# Patient Record
Sex: MALE | Race: WHITE | NOT HISPANIC OR LATINO | Employment: FULL TIME | URBAN - METROPOLITAN AREA
[De-identification: names, ages, dates, MRNs, and addresses within clinical notes are randomized per-mention and may not be internally consistent; named-entity substitution may affect disease eponyms.]

---

## 2017-02-05 ENCOUNTER — HOSPITAL ENCOUNTER (EMERGENCY)
Facility: MEDICAL CENTER | Age: 27
End: 2017-02-05
Attending: EMERGENCY MEDICINE
Payer: COMMERCIAL

## 2017-02-05 ENCOUNTER — APPOINTMENT (OUTPATIENT)
Dept: RADIOLOGY | Facility: MEDICAL CENTER | Age: 27
End: 2017-02-05
Attending: EMERGENCY MEDICINE
Payer: COMMERCIAL

## 2017-02-05 VITALS
WEIGHT: 155.65 LBS | HEIGHT: 72 IN | SYSTOLIC BLOOD PRESSURE: 117 MMHG | DIASTOLIC BLOOD PRESSURE: 65 MMHG | HEART RATE: 57 BPM | TEMPERATURE: 97.4 F | OXYGEN SATURATION: 97 % | BODY MASS INDEX: 21.08 KG/M2 | RESPIRATION RATE: 16 BRPM

## 2017-02-05 DIAGNOSIS — N41.0 ACUTE PROSTATITIS: ICD-10-CM

## 2017-02-05 LAB
ALBUMIN SERPL BCP-MCNC: 5.1 G/DL (ref 3.2–4.9)
ALBUMIN/GLOB SERPL: 2 G/DL
ALP SERPL-CCNC: 78 U/L (ref 30–99)
ALT SERPL-CCNC: 13 U/L (ref 2–50)
ANION GAP SERPL CALC-SCNC: 10 MMOL/L (ref 0–11.9)
APPEARANCE UR: CLEAR
AST SERPL-CCNC: 22 U/L (ref 12–45)
BASOPHILS # BLD AUTO: 0.5 % (ref 0–1.8)
BASOPHILS # BLD: 0.02 K/UL (ref 0–0.12)
BILIRUB SERPL-MCNC: 0.7 MG/DL (ref 0.1–1.5)
BILIRUB UR QL STRIP.AUTO: NEGATIVE
BUN SERPL-MCNC: 12 MG/DL (ref 8–22)
CALCIUM SERPL-MCNC: 10.1 MG/DL (ref 8.5–10.5)
CHLORIDE SERPL-SCNC: 102 MMOL/L (ref 96–112)
CO2 SERPL-SCNC: 24 MMOL/L (ref 20–33)
COLOR UR: YELLOW
CREAT SERPL-MCNC: 0.74 MG/DL (ref 0.5–1.4)
EOSINOPHIL # BLD AUTO: 0.04 K/UL (ref 0–0.51)
EOSINOPHIL NFR BLD: 0.9 % (ref 0–6.9)
ERYTHROCYTE [DISTWIDTH] IN BLOOD BY AUTOMATED COUNT: 40.9 FL (ref 35.9–50)
GFR SERPL CREATININE-BSD FRML MDRD: >60 ML/MIN/1.73 M 2
GLOBULIN SER CALC-MCNC: 2.6 G/DL (ref 1.9–3.5)
GLUCOSE SERPL-MCNC: 110 MG/DL (ref 65–99)
GLUCOSE UR STRIP.AUTO-MCNC: NEGATIVE MG/DL
HCT VFR BLD AUTO: 42.6 % (ref 42–52)
HGB BLD-MCNC: 15.5 G/DL (ref 14–18)
IMM GRANULOCYTES # BLD AUTO: 0.01 K/UL (ref 0–0.11)
IMM GRANULOCYTES NFR BLD AUTO: 0.2 % (ref 0–0.9)
KETONES UR STRIP.AUTO-MCNC: 10 MG/DL
LEUKOCYTE ESTERASE UR QL STRIP.AUTO: NEGATIVE
LIPASE SERPL-CCNC: 9 U/L (ref 11–82)
LYMPHOCYTES # BLD AUTO: 0.8 K/UL (ref 1–4.8)
LYMPHOCYTES NFR BLD: 18.7 % (ref 22–41)
MCH RBC QN AUTO: 33.4 PG (ref 27–33)
MCHC RBC AUTO-ENTMCNC: 36.4 G/DL (ref 33.7–35.3)
MCV RBC AUTO: 91.8 FL (ref 81.4–97.8)
MICRO URNS: ABNORMAL
MONOCYTES # BLD AUTO: 0.44 K/UL (ref 0–0.85)
MONOCYTES NFR BLD AUTO: 10.3 % (ref 0–13.4)
NEUTROPHILS # BLD AUTO: 2.96 K/UL (ref 1.82–7.42)
NEUTROPHILS NFR BLD: 69.4 % (ref 44–72)
NITRITE UR QL STRIP.AUTO: NEGATIVE
NRBC # BLD AUTO: 0 K/UL
NRBC BLD AUTO-RTO: 0 /100 WBC
PH UR STRIP.AUTO: 6 [PH]
PLATELET # BLD AUTO: 146 K/UL (ref 164–446)
PMV BLD AUTO: 10.9 FL (ref 9–12.9)
POTASSIUM SERPL-SCNC: 3.4 MMOL/L (ref 3.6–5.5)
PROT SERPL-MCNC: 7.7 G/DL (ref 6–8.2)
PROT UR QL STRIP: NEGATIVE MG/DL
RBC # BLD AUTO: 4.64 M/UL (ref 4.7–6.1)
RBC UR QL AUTO: NEGATIVE
SODIUM SERPL-SCNC: 136 MMOL/L (ref 135–145)
SP GR UR STRIP.AUTO: 1.02
WBC # BLD AUTO: 4.3 K/UL (ref 4.8–10.8)

## 2017-02-05 PROCEDURE — 96375 TX/PRO/DX INJ NEW DRUG ADDON: CPT

## 2017-02-05 PROCEDURE — 81003 URINALYSIS AUTO W/O SCOPE: CPT

## 2017-02-05 PROCEDURE — 36415 COLL VENOUS BLD VENIPUNCTURE: CPT

## 2017-02-05 PROCEDURE — 700111 HCHG RX REV CODE 636 W/ 250 OVERRIDE (IP): Performed by: EMERGENCY MEDICINE

## 2017-02-05 PROCEDURE — 83690 ASSAY OF LIPASE: CPT

## 2017-02-05 PROCEDURE — 74177 CT ABD & PELVIS W/CONTRAST: CPT

## 2017-02-05 PROCEDURE — 96374 THER/PROPH/DIAG INJ IV PUSH: CPT

## 2017-02-05 PROCEDURE — 85025 COMPLETE CBC W/AUTO DIFF WBC: CPT

## 2017-02-05 PROCEDURE — 80053 COMPREHEN METABOLIC PANEL: CPT

## 2017-02-05 PROCEDURE — 700105 HCHG RX REV CODE 258: Performed by: EMERGENCY MEDICINE

## 2017-02-05 PROCEDURE — 700117 HCHG RX CONTRAST REV CODE 255: Performed by: EMERGENCY MEDICINE

## 2017-02-05 PROCEDURE — 99285 EMERGENCY DEPT VISIT HI MDM: CPT

## 2017-02-05 RX ORDER — HYDROCODONE BITARTRATE AND ACETAMINOPHEN 7.5; 325 MG/1; MG/1
1 TABLET ORAL EVERY 4 HOURS PRN
Qty: 15 TAB | Refills: 0 | Status: SHIPPED | OUTPATIENT
Start: 2017-02-05 | End: 2017-11-08

## 2017-02-05 RX ORDER — ONDANSETRON 2 MG/ML
4 INJECTION INTRAMUSCULAR; INTRAVENOUS ONCE
Status: COMPLETED | OUTPATIENT
Start: 2017-02-05 | End: 2017-02-05

## 2017-02-05 RX ORDER — CIPROFLOXACIN 500 MG/1
500 TABLET, FILM COATED ORAL 2 TIMES DAILY
Qty: 20 TAB | Refills: 0 | Status: SHIPPED | OUTPATIENT
Start: 2017-02-05 | End: 2017-11-08

## 2017-02-05 RX ORDER — SODIUM CHLORIDE 9 MG/ML
1000 INJECTION, SOLUTION INTRAVENOUS ONCE
Status: COMPLETED | OUTPATIENT
Start: 2017-02-05 | End: 2017-02-05

## 2017-02-05 RX ADMIN — HYDROMORPHONE HYDROCHLORIDE 1 MG: 1 INJECTION, SOLUTION INTRAMUSCULAR; INTRAVENOUS; SUBCUTANEOUS at 08:06

## 2017-02-05 RX ADMIN — SODIUM CHLORIDE 1000 ML: 9 INJECTION, SOLUTION INTRAVENOUS at 08:12

## 2017-02-05 RX ADMIN — ONDANSETRON 4 MG: 2 INJECTION, SOLUTION INTRAMUSCULAR; INTRAVENOUS at 08:06

## 2017-02-05 RX ADMIN — IOHEXOL 100 ML: 350 INJECTION, SOLUTION INTRAVENOUS at 10:11

## 2017-02-05 ASSESSMENT — PAIN SCALES - GENERAL
PAINLEVEL_OUTOF10: 6
PAINLEVEL_OUTOF10: 7

## 2017-02-05 NOTE — ED PROVIDER NOTES
ED Provider Note    CHIEF COMPLAINT  Chief Complaint   Patient presents with   • Back Pain     pt states back pain started 1730 yesterday and progessed to abd pain this am. Pt states normal bm yesterday.  Pt denies blood in the urine.  Denies hx kidney stones   • Abdominal Pain       HPI  Rafi Zapata Jr. is a 26 y.o. male who presents for evaluation of abdominal pain.  Patient states that around 5:30 PM last evening he began developing some mild abdominal pain.  Today the patient states the pain has gotten more severe and was 7/10 on his pain scale.  Most of the pain is diffusely in the lower abdominal area.  The patient denies: Fever, URI symptoms, cardiorespiratory symptoms, hematemesis, melena, hematochezia, hematuria, dysuria, scrotal or testicular pain.  No other acute symptomatology or complaints.    REVIEW OF SYSTEMS  See HPI for further details.  No history of: Hypertension, diabetes, thyroid dysfunction, cardiac disorders, gastrointestinal disorders, genitourinary disorders.  All other systems negative.    PAST MEDICAL HISTORY  Past Medical History   Diagnosis Date   • ASTHMA        FAMILY HISTORY  No family history on file.    SOCIAL HISTORY  Positive tobacco use; denies alcohol or drugs;    SURGICAL HISTORY  No past surgical history on file.    CURRENT MEDICATIONS  See nurses notes    ALLERGIES  No Known Allergies    PHYSICAL EXAM  VITAL SIGNS: /65 mmHg  Pulse 59  Temp(Src) 36.3 °C (97.4 °F) (Temporal)  Resp 16  Ht 1.829 m (6')  Wt 70.6 kg (155 lb 10.3 oz)  BMI 21.10 kg/m2  SpO2 99%   Constitutional: A 26-year-old male, Well developed, Well nourished, appears mildly uncomfortable but oriented ×3   HENT: ,Atraumatic, Bilateral external ears normal, tympanic membranes clear, Oropharynx moist, No oral exudates, Nose normal.   Eyes: PERRL, EOMI, Conjunctiva normal, No discharge.   Neck: Normal range of motion, No tenderness, Supple, No stridor.   Lymphatic: No lymphadenopathy noted.    Cardiovascular: Normal heart rate, Normal rhythm, No murmurs, No rubs, No gallops.   Thorax & Lungs: Normal Equal breath sounds, No respiratory distress, No wheezing, no stridor, no rales. No chest tenderness.   Abdomen: Mild lower abdominal tenderness but no localized pain McBurney's point; negative Rovsing sign; no guarding rebound or rigidity; no adenopathy hernias or masses; normal bowel sounds; no organomegaly  Skin: Good skin turgor, pink, warm, dry. No rashes, petechiae, purpura. Normal capillary refill.   Back: No tenderness, No CVA tenderness.   Genitalia: External genitalia appear normal, No masses or lesions. No discharge. Nontender; rectal examination reveals a mildly tender prostate with no significant fluctuance or masses identified; stools brown heme-negative  Extremities: Intact distal pulses, No edema, No tenderness, No cyanosis, No clubbing. Vascular: Pulses are 2+, symmetric in the upper and lower extremities.  Musculoskeletal: Good range of motion in all major joints. No tenderness to palpation or major deformities noted.   Neurologic: Alert & oriented x 3, Normal motor function, Normal sensory function, No gross focal deficits noted.   Psychiatric: Affect normal, Judgment normal, Mood normal.     RADIOLOGY/PROCEDURES  CT-ABDOMEN-PELVIS WITH   Final Result      1.  No bowel obstruction or pneumoperitoneum.   2.  No secondary signs of acute appendicitis, however the appendix is not visualized.   3.  Mildly prominent and ill-defined prostate and seminal vesicles, concerning for infection/inflammation.            COURSE & MEDICAL DECISION MAKING  Pertinent Labs & Imaging studies reviewed. (See chart for details)  1.  Monitor   2.  IV normal saline  3.  Zofran, titrated   4.  Dilaudid, titrated    Laboratory studies: CBC shows white count 4.3, 69% neutrophils, 18% lymphocytes, 10% monocytes, hemoglobin 15.5, hematocrit 42.6; CMP shows a potassium of 3.4, random glucose 110 otherwise within normal  limits; lipase 9; urinalysis shows mild ketones otherwise negative;    Discussion: At this time, the patient presents for evaluation of lower abdominal pain.  The appendix was not visualized but no supportive findings suggest appendicitis as he has no localized pain at McBurney's point normal white count differential, as well as no secondary inflammatory changes on CT scanning.  Laboratory studies urinalysis are negative.  The patient may have very mild prostatitis.  Based on his clinical symptoms and findings, I will initiate treatment with antibiotic therapy.  I have discussed the findings and treatment plan with the patient.  He indicates that he is comfortable with this explanation and disposition.    FINAL IMPRESSION  1. Acute prostatitis        PLAN  1.  Appropriate discharge instructions given  2.  Cipro 500 mg twice a day #28  3.  Lortab 7.5 mg #15  4.  Follow up primary care  5.  Follow-up with urology    Electronically signed by: Guy G Gansert, 2/5/2017 8:21 AM

## 2017-02-05 NOTE — ED NOTES
All tests resulted and patient put up for re-eval by erp. Patient resting comfortably at this time

## 2017-02-05 NOTE — ED AVS SNAPSHOT
Home Care Instructions                                                                                                                Rafi Zapata Jr.   MRN: [id number]    Department:  [facility], Emergency Dept   Date of Visit:  2/5/2017            [facility], Emergency Dept    [address] [address]    Phone:  [phone]      You were seen by     Guy G Gansert, M.D.      Your Diagnosis Was     Acute prostatitis     N41.0       These are the medications you received during your hospitalization from 02/05/2017 0702 to 02/05/2017 1103     Date/Time Order Dose Route Action    02/05/2017 0812 NS infusion 1,000 mL 1,000 mL Intravenous New Bag    02/05/2017 0806 ondansetron (ZOFRAN) syringe/vial injection 4 mg 4 mg Intravenous Given    02/05/2017 0806 HYDROmorphone (DILAUDID) injection 1 mg 1 mg Intravenous Given    02/05/2017 1011 iohexol (OMNIPAQUE) 350 mg/mL 100 mL Intravenous Given      Follow-up Information     1. Follow up with [facility].    Contact information    [address] [address] [phone]        2. Follow up with Thang Thompson M.D..    Specialty:  Urology    Contact information    [address]  [facility] [address] [phone]        Medication Information     Review all of your home medications and newly ordered medications with your primary doctor and/or pharmacist as soon as possible. Follow medication instructions as directed by your doctor and/or pharmacist.     Please keep your complete medication list with you and share with your physician. Update the information when medications are discontinued, doses are changed, or new medications (including over-the-counter products) are added; and carry medication information at all times in the event of emergency situations.               Medication List      START taking these medications        Instructions    ciprofloxacin 500 MG Tabs   Commonly known as:   CIPRO    Take 1 Tab by mouth 2 times a day.   Dose:  500 mg       hydrocodone-acetaminophen 7.5-325 MG per tablet   Commonly known as:  NORCO    Take 1 Tab by mouth every four hours as needed (pain).   Dose:  1 Tab         ASK your doctor about these medications        Instructions    * albuterol 108 (90 BASE) MCG/ACT Aers inhalation aerosol    Inhale 2 Puffs by mouth every 6 hours as needed for Shortness of Breath.   Dose:  2 Puff       * albuterol 108 (90 BASE) MCG/ACT Aers inhalation aerosol    Inhale 2 Puffs by mouth every 6 hours as needed for Shortness of Breath.   Dose:  2 Puff       amoxicillin 875 MG tablet   Commonly known as:  AMOXIL    Take 1 Tab by mouth 2 times a day.   Dose:  875 mg       * azithromycin 250 MG Tabs   Commonly known as:  ZITHROMAX    Take 2 pills by mouth on day one, take 1 pill by mouth on days 2-5       * azithromycin 250 MG Tabs   Commonly known as:  ZITHROMAX    Take as directed on package. Dispense one package.       guaifenesin-codeine Soln oral solution   Commonly known as:  CHERATUSSIN AC    Take 5 mL by mouth every four hours as needed for Cough.   Dose:  5 mL       methylPREDNISolone 4 MG tablet   Commonly known as:  MEDROL DOSEPACK    Take according to package instructions       * oxycodone-acetaminophen 5-325 MG Tabs   Commonly known as:  PERCOCET    Take 1-2 Tabs by mouth every four hours as needed.   Dose:  1-2 Tab       * oxycodone-acetaminophen  MG Tabs   Commonly known as:  PERCOCET-10    Take 1 Tab by mouth every four hours as needed for Severe Pain.   Dose:  1 Tab       penicillin v potassium 500 MG Tabs   Commonly known as:  VEETID    Take 1 Tab by mouth 4 times a day.   Dose:  500 mg       tramadol 50 MG Tabs   Commonly known as:  ULTRAM    Take 1-2 Tabs by mouth every 8 hours as needed.   Dose:   mg       * Notice:  This list has 6 medication(s) that are the same as other medications prescribed for you. Read the directions carefully, and ask your  doctor or other care provider to review them with you.            Procedures and tests performed during your visit     Procedure/Test Number of Times Performed    CBC WITH DIFFERENTIAL 1    COMP METABOLIC PANEL 1    CONSENT FOR CONTRAST INJECTION 2    CT-ABDOMEN-PELVIS WITH 1    ESTIMATED GFR 1    LIPASE 1    URINALYSIS (UA) 1        Discharge Instructions       Prostatitis  Prostatitis is redness, soreness, and puffiness (swelling) of the prostate gland. The prostate gland is the walnut-sized gland located just below your bladder.  HOME CARE:   · Take all medicines as told by your doctor.  · Take warm-water baths (sitz baths) as told by your doctor.  GET HELP IF:  · Your symptoms get worse, not better.  · You have a fever.  GET HELP RIGHT AWAY IF:   · You have chills.  · You feel sick to your stomach (nauseous) or like you will throw up (vomit).  · You feel lightheaded or like you will pass out (faint).  · You are unable to pee (urinate).  · You have blood or blood clumps (clots) in your pee (urine).  MAKE SURE YOU:  · Understand these instructions.  · Will watch your condition.  · Will get help right away if you are not doing well or get worse.     This information is not intended to replace advice given to you by your health care provider. Make sure you discuss any questions you have with your health care provider.     Document Released: 06/18/2013 Document Revised: 01/08/2016 Document Reviewed: 07/07/2014  [employer] Interactive Patient Education ©2016 [employer]    Prostate Problems  The prostate gland is part of the reproductive system of men. A normal prostate is about the size and shape of a walnut. The prostate may grow as a man ages. The gland makes a fluid that is mixed with sperm to make semen. This gland is located in front of the rectum and just below the bladder, where urine is stored. The prostate surrounds the urethra. The urethra is the tube through which urine passes out of the body.  COMMON  "PROSTATE PROBLEMS  · Prostatitis   · The most common prostate problem in men under 50 is inflammation of the prostate gland (prostatitis).   · This is generally an infection that enters the prostate from the urethra.   · It may be sexually transmitted.   · It could be caused by a slow growing cancer.   · If not caused by cancer, treatment with antibiotics is usually very effective. In some cases, symptoms may be slow to go away and may come back. This condition is commonly called chronic prostatitis.   · Benign Prostatic Hypertrophy (BPH)   · BPH is an enlarged prostate.   · There is no cancer present with this condition.   · The exact cause is not known; but it is one of the most common problems for men over age 50.   · If your caregiver finds BPH, but there are no symptoms or mild symptoms, you may need examinations once or twice a year.   · Prostate cancer   · Symptoms of prostate cancer will vary depending on how big the tumor is and whether it has spread beyond the prostate. If it has spread, your caregiver must find out how far it has spread.   · Prostate cancer is treated by various combinations of surgery, radiation therapy, hormonal therapy, and chemotherapy. Sometimes the prostate cancer is just observed to determine the need for treatment.   · Some men with enlarged prostates have no symptoms at all.   · Symptoms vary a lot. They are usually referred to as \"lower urinary tract symptoms\" (LUTS).   SYMPTOMS   · Frequent urination.   · Getting up often during the night to urinate.   · A feeling that you still have a full bladder after passing your urine.   · A weak stream or dribbling after urination.   · Having to push or strain to pass your urine.   · Fever.   · Pain in the low back or groin.   · Blood in the urine.   · Discharge from the penis.   · Weight loss.   · There may be visible enlargement of the bladder.   · In severe cases, you may not be able to empty your bladder and there is severe pain. This " is an emergency and requires immediate medical care. If this occurs many times, you can develop permanent damage to the bladder and kidneys.   · Different prostate problems may have similar symptoms. In the early stages, there may be no symptoms at all.   DIAGNOSIS   · If you have urination problems, or a digital rectal exam (DENISE) or prostate-specific antigen (PSA) test indicates that you might have a problem, additional tests will be suggested.   · Ask your caregiver if any special preparations are needed before your diagnostic tests.   TREATMENT   · If your caregiver finds BPH and you have bothersome symptoms, medications can be taken by mouth to help.   · If medications are not helpful, surgery may be advised. Different procedures use different methods to heat, destroy, and remove a small amount of the prostate tissue. These methods include the use of:   · Microwaves.   · High frequency sound waves.   · A laser.   · An electric charge.   · Other surgeries are available. All of these are preceded by appropriate anesthesia.   · The surgeon scrapes away part of the inside of the prostate using a small scope put into the urethra. This reduces the squeezing on the urethra.   · The surgeon makes small cuts in the prostate to reduce the squeezing pressure on the urethra.   · Removal of the entire prostate is carried out through a small incision.   · Removal of the entire prostate through a larger incision may occur in situations where the surgeon feels the other operations are not appropriate.   TYPES OF TESTS  DENISE  · You may be asked to bend over a table or to lie on your side holding your knees close to your chest. Your caregiver advances a gloved, lubricated finger into the rectum and feels the part of the prostate that lies next to it. You may find the DENISE slightly uncomfortable, but it is very brief.   · This exam tells the caregiver whether the gland has any bumps, irregularities, or soft or hard spots that  require additional tests.   · If a prostate infection is suspected, the caregiver might press the prostate during the DENISE to obtain fluid for examination under a microscope.   PSA Blood Test  · The amount of PSA, a protein produced by prostate cells, is often higher in the blood of men who have prostate cancer. However, an elevated level of PSA does not necessarily mean you have cancer.   · Healthy men should no longer receive PSA blood tests as part of routine cancer screening. Consult with your caregiver about prostate cancer screening.   Urinalysis  · This can help find an infection if one is present.   Transrectal Ultrasound and Prostate Biopsy  · If prostate cancer is suspected, your caregiver may recommend a transrectal ultrasound.   · A probe is inserted into the rectum. The probe directs high frequency sound waves at the prostate, and the created image is visible on a monitor screen.   · The image shows the size of the prostate and any abnormalities. This cannot clearly identify tumors.   · To determine whether an abnormality is a tumor, the caregiver may use the probe and the ultrasound images to guide a biopsy needle to the abnormality. Prostate tissue samples will be collected for examination under a microscope. A specialist will look at the tissue samples to see if cancer is present.   MRI and CT Scans  · MRI and CT scans both use computers to create images of internal organs.   · These tests can help identify abnormal structures.   · They cannot show a difference between cancerous tumors and noncancerous growths.   · If a biopsy confirms cancer, your caregiver might use these imaging techniques to determine how far the cancer has spread. In most cases, these tests are not required. Your caregiver will discuss the need for these tests if he or she feels they are indicated.   Urodynamic Tests  · A weak stream of urine and difficulty emptying the bladder fully may be signs of urine blockage caused by an  enlarged prostate that is squeezing the urethra.   · If your problem appears to be related to a blockage, your caregiver may recommend tests that measure bladder pressure and urine flow rate.   · You may be asked to urinate into a device that measures how quickly the urine is flowing. It will record how many seconds it takes for the peak flow rate to be reached.   · Another test measures post-void residual. This is the amount of urine left in your bladder when you have finished passing urine.   Intravenous Pyelogram (IVP)   · IVP is an X-ray of the urinary tract.   · In this test, a fluid (contrast) is injected into a vein. X-ray pictures are taken at different times to see the progression of contrast through the kidney and ureter.   · The contrast makes the urine visible on the X-ray and shows any narrowing or blockage in the urinary tract.   · This procedure can help show problems in the kidneys, ureters, or bladder that may have come from urine retention or backup.   Abdominal Ultrasound  · For an abdominal ultrasound exam, gel will be applied to your lower abdomen. A handheld device will be moved across the lower abdomen to record a picture of your entire urinary tract.   · An abdominal ultrasound can show damage in the upper urinary tract that comes from urine blockage.   Cystoscopy  · A solution will numb the inside of the penis. A small tube (cystoscope) is inserted through the urethral opening at the tip of the penis.   · The tube allows your caregiver to see the inside of the urethra and bladder.   · The caregiver can determine the location and amount of the obstruction causing problems.   Finding out the results of your test  Not all test results are available during your visit. If your test results are not back during the visit, make an appointment with your caregiver to find out the results. Do not assume everything is normal if you have not heard from your caregiver or the medical facility. It is  important for you to follow up on all of your test results.  HOME CARE INSTRUCTIONS   Home care instructions after diagnostic testing will vary dependent upon the procedure performed.  · Care after urodynamic tests or a cystoscopy:   · You may have mild discomfort for a few hours.   · Drinking two, 8 ounce (240 mL) glasses of water each hour, for 2 hours should help.   · Ask your caregiver whether you can take a warm bath. If not, you may be able to hold a warm, damp washcloth over the urethral opening to relieve the discomfort.   · Care after a prostate biopsy:   · A prostate biopsy may produce pain in the area of the rectum and the area between the rectum and the scrotum (the perineum).   · Only take over-the-counter or prescription medications for pain, discomfort, or fever as directed by your caregiver.   · You may be given antibiotics to prevent an infection.   SEEK MEDICAL CARE IF:  · You have any sign of an infection including pain with urination, chills, or fever.   FOR MORE INFORMATION  American Foundation for Urologic Disease: www.urologyhealth.org  National Cancer Bouse (NCI): www.nci.nih.gov  The National Bouse of Diabetes and Digestive and Kidney Diseases (NIDDK): www.niddk.nih.gov  The Prostatitis Foundation: www.prostatitis.org  Document Released: 10/14/2008 Document Revised: 03/11/2013 Document Reviewed: 07/02/2010  [employer]® Patient Information ©2013 [employer].          Patient Information     Patient Information    Following emergency treatment: all patient requiring follow-up care must return either to a private physician or a clinic if your condition worsens before you are able to obtain further medical attention, please return to the emergency room.     Billing Information    At [facility], we work to make the billing process streamlined for our patients.  Our Representatives are here to answer any questions you may have regarding your hospital bill.  If you have insurance  coverage and have supplied your insurance information to us, we will submit a claim to your insurer on your behalf.  Should you have any questions regarding your bill, we can be reached online or by phone as follows:  Online: You are able pay your bills online or live chat with our representatives about any billing questions you may have. We are here to help Monday - Friday from 8:00am to 7:30pm and 9:00am - 12:00pm on Saturdays.  Please visit [email or web address]  for more information.   Phone:  [phone] or [phone]    Please note that your emergency physician, surgeon, pathologist, radiologist, anesthesiologist, and other specialists are not employed by [facility] and will therefore bill separately for their services.  Please contact them directly for any questions concerning their bills at the numbers below:     Emer[facility] Physician Services:  [phone]  [facility]:  [phone]  [facility]:  [phone]  [facility]:  [phone]    1. Your final bill may vary from the amount quoted upon discharge if all procedures are not complete at that time, or if your doctor has additional procedures of which we are not aware. You will receive an additional bill if you return to the Emergency Department at [facility] for suture removal regardless of the facility of which the sutures were placed.     2. Please arrange for settlement of this account at the emergency registration.    3. All self-pay accounts are due in full at the time of treatment.  If you are unable to meet this obligation then payment is expected within 4-5 days.     4. If you have had radiology studies (CT, X-ray, Ultrasound, MRI), you have received a preliminary result during your emergency department visit. Please contact the radiology department [phone] to receive a copy of your final result. Please discuss the Final result with your primary  physician or with the follow up physician provided.     Crisis Hotline:  [place] Crisis Hotline:  7-215-VWERUUX or 1-461.912.5282  [place] Crisis Hotline:    1-306.545.6581 or 785-941-6952         ED Discharge Follow Up Questions    1. In order to provide you with very good care, we would like to follow up with a phone call in the next few days.  May we have your permission to contact you?     YES /  NO    2. What is the best phone number to call you? (       )_____-__________    3. What is the best time to call you?      Morning  /  Afternoon  /  Evening                   Patient Signature:  ____________________________________________________________    Date:  ____________________________________________________________

## 2017-02-05 NOTE — ED NOTES
IV removed and dressing placed. Pt verbalizes understanding of discharge instructions. Patient to follow up with PCP for further treatment. Dressed self and ambualted to discharge with steady gait. NAD or deficits noted at time of discharge

## 2017-02-05 NOTE — ED AVS SNAPSHOT
Quire Access Code: [email or web address]  Expires: 3/7/2017  8:59 AM    Quire  A secure, online tool to manage your health information     [employer]’s Quire® is a secure, online tool that connects you to your personalized health information from the privacy of your home -- day or night - making it very easy for you to manage your healthcare. Once the activation process is completed, you can even access your medical information using the Quire malina, which is available for free in the Apple Malina store or Google Play store.     Quire provides the following levels of access (as shown below):   My Chart Features   [facility] Primary Care Doctor [facility]  Specialists [facility]  Urgent  Care Non-[facility]  Primary Care  Doctor   Email your healthcare team securely and privately 24/7 X X X X   Manage appointments: schedule your next appointment; view details of past/upcoming appointments X      Request prescription refills. X      View recent personal medical records, including lab and immunizations X X X X   View health record, including health history, allergies, medications X X X X   Read reports about your outpatient visits, procedures, consult and ER notes X X X X   See your discharge summary, which is a recap of your hospital and/or ER visit that includes your diagnosis, lab results, and care plan. X X       How to register for Quire:  1. Go to  [email or web address].  2. Click on the Sign Up Now box, which takes you to the New Member Sign Up page. You will need to provide the following information:  a. Enter your Quire Access Code exactly as it appears at the top of this page. (You will not need to use this code after you’ve completed the sign-up process. If you do not sign up before the expiration date, you must request a new code.)   b. Enter your date of birth.   c. Enter your home email address.   d. Click Submit, and follow the next screen’s instructions.  3. Create a Quire ID. This will be your Quire  login ID and cannot be changed, so think of one that is secure and easy to remember.  4. Create a [employer] password. You can change your password at any time.  5. Enter your Password Reset Question and Answer. This can be used at a later time if you forget your password.   6. Enter your e-mail address. This allows you to receive e-mail notifications when new information is available in ThinkGrid.  7. Click Sign Up. You can now view your health information.    For assistance activating your ThinkGrid account, call [phone]

## 2017-02-05 NOTE — ED AVS SNAPSHOT
2/5/2017          Rafi Zapata Jr.  [id number] [address] [facility] [place] [address]    Dear Rafi:    [facility] wants to ensure your discharge home is safe and you or your loved ones have had all your questions answered regarding your care after you leave the hospital.    You may receive a telephone call within two days of your discharge.  This call is to make certain you understand your discharge instructions as well as ensure we provided you with the best care possible during your stay with us.     The call will only last approximately 3-5 minutes and will be done by a nurse.    Once again, we want to ensure your discharge home is safe and that you have a clear understanding of any next steps in your care.  If you have any questions or concerns, please do not hesitate to contact us, we are here for you.  Thank you for choosing [facility] for your healthcare needs.    Sincerely,    Dung Torres    [facility]

## 2017-02-05 NOTE — ED NOTES
Chief Complaint   Patient presents with   • Back Pain     pt states back pain started 1730 yesterday and progessed to abd pain this am. Pt states normal bm yesterday.  Pt denies blood in the urine.  Denies hx kidney stones   • Abdominal Pain

## 2017-09-11 ENCOUNTER — APPOINTMENT (OUTPATIENT)
Dept: RADIOLOGY | Facility: MEDICAL CENTER | Age: 27
End: 2017-09-11
Attending: EMERGENCY MEDICINE

## 2017-09-11 ENCOUNTER — HOSPITAL ENCOUNTER (EMERGENCY)
Facility: MEDICAL CENTER | Age: 27
End: 2017-09-11
Attending: EMERGENCY MEDICINE

## 2017-09-11 VITALS
WEIGHT: 145.5 LBS | BODY MASS INDEX: 19.71 KG/M2 | OXYGEN SATURATION: 98 % | SYSTOLIC BLOOD PRESSURE: 115 MMHG | TEMPERATURE: 98.6 F | HEIGHT: 72 IN | HEART RATE: 75 BPM | DIASTOLIC BLOOD PRESSURE: 40 MMHG | RESPIRATION RATE: 16 BRPM

## 2017-09-11 DIAGNOSIS — S61.511A WRIST LACERATION, RIGHT, INITIAL ENCOUNTER: ICD-10-CM

## 2017-09-11 PROCEDURE — 303747 HCHG EXTRA SUTURE

## 2017-09-11 PROCEDURE — 99284 EMERGENCY DEPT VISIT MOD MDM: CPT

## 2017-09-11 PROCEDURE — 73110 X-RAY EXAM OF WRIST: CPT | Mod: RT

## 2017-09-11 PROCEDURE — 304999 HCHG REPAIR-SIMPLE/INTERMED LEVEL 1

## 2017-09-11 RX ORDER — HYDROCODONE BITARTRATE AND ACETAMINOPHEN 5; 325 MG/1; MG/1
1-2 TABLET ORAL EVERY 4 HOURS PRN
Qty: 14 TAB | Refills: 0 | Status: SHIPPED | OUTPATIENT
Start: 2017-09-11 | End: 2017-11-08

## 2017-09-11 RX ORDER — HYDROCODONE BITARTRATE AND ACETAMINOPHEN 5; 325 MG/1; MG/1
1-2 TABLET ORAL EVERY 4 HOURS PRN
Qty: 14 TAB | Refills: 0 | Status: SHIPPED | OUTPATIENT
Start: 2017-09-11 | End: 2017-09-11

## 2017-09-11 ASSESSMENT — ENCOUNTER SYMPTOMS: TINGLING: 0

## 2017-09-11 ASSESSMENT — PAIN SCALES - GENERAL: PAINLEVEL_OUTOF10: 5

## 2017-09-12 NOTE — ED NOTES
Chief Complaint   Patient presents with   • Wrist Laceration     on glass window     BIBA - wound is wrapped, bleeding controlled. Tetanus UTD. CMS intact.      /71   Pulse 97   Temp 37 °C (98.6 °F) (Temporal)   Resp 16   Ht 1.829 m (6')   Wt 66 kg (145 lb 8.1 oz)   SpO2 96%   BMI 19.73 kg/m²       Pt Informed regarding triage process and verbalized understanding to inform triage tech or RN for any changes in condition.  Placed in lobby.

## 2017-09-12 NOTE — DISCHARGE INSTRUCTIONS
Laceration Care, Adult  A laceration is a cut that goes through all of the layers of the skin and into the tissue that is right under the skin. Some lacerations heal on their own. Others need to be closed with stitches (sutures), staples, skin adhesive strips, or skin glue. Proper laceration care minimizes the risk of infection and helps the laceration to heal better.  HOW TO CARE FOR YOUR LACERATION  If sutures or staples were used:  · Keep the wound clean and dry.  · If you were given a bandage (dressing), you should change it at least one time per day or as told by your health care provider. You should also change it if it becomes wet or dirty.  · Keep the wound completely dry for the first 24 hours or as told by your health care provider. After that time, you may shower or bathe. However, make sure that the wound is not soaked in water until after the sutures or staples have been removed.  · Clean the wound one time each day or as told by your health care provider:  ¨ Wash the wound with soap and water.  ¨ Rinse the wound with water to remove all soap.  ¨ Pat the wound dry with a clean towel. Do not rub the wound.  · After cleaning the wound, apply a thin layer of antibiotic ointment as told by your health care provider. This will help to prevent infection and keep the dressing from sticking to the wound.  · Have the sutures or staples removed as told by your health care provider.  If skin adhesive strips were used:  · Keep the wound clean and dry.  · If you were given a bandage (dressing), you should change it at least one time per day or as told by your health care provider. You should also change it if it becomes dirty or wet.  · Do not get the skin adhesive strips wet. You may shower or bathe, but be careful to keep the wound dry.  · If the wound gets wet, pat it dry with a clean towel. Do not rub the wound.  · Skin adhesive strips fall off on their own. You may trim the strips as the wound heals. Do not  remove skin adhesive strips that are still stuck to the wound. They will fall off in time.  If skin glue was used:  · Try to keep the wound dry, but you may briefly wet it in the shower or bath. Do not soak the wound in water, such as by swimming.  · After you have showered or bathed, gently pat the wound dry with a clean towel. Do not rub the wound.  · Do not do any activities that will make you sweat heavily until the skin glue has fallen off on its own.  · Do not apply liquid, cream, or ointment medicine to the wound while the skin glue is in place. Using those may loosen the film before the wound has healed.  · If you were given a bandage (dressing), you should change it at least one time per day or as told by your health care provider. You should also change it if it becomes dirty or wet.  · If a dressing is placed over the wound, be careful not to apply tape directly over the skin glue. Doing that may cause the glue to be pulled off before the wound has healed.  · Do not pick at the glue. The skin glue usually remains in place for 5-10 days, then it falls off of the skin.  General Instructions  · Take over-the-counter and prescription medicines only as told by your health care provider.  · If you were prescribed an antibiotic medicine or ointment, take or apply it as told by your doctor. Do not stop using it even if your condition improves.  · To help prevent scarring, make sure to cover your wound with sunscreen whenever you are outside after stitches are removed, after adhesive strips are removed, or when glue remains in place and the wound is healed. Make sure to wear a sunscreen of at least 30 SPF.  · Do not scratch or pick at the wound.  · Keep all follow-up visits as told by your health care provider. This is important.  · Check your wound every day for signs of infection. Watch for:  ¨ Redness, swelling, or pain.  ¨ Fluid, blood, or pus.  · Raise (elevate) the injured area above the level of your heart  while you are sitting or lying down, if possible.  SEEK MEDICAL CARE IF:  · You received a tetanus shot and you have swelling, severe pain, redness, or bleeding at the injection site.  · You have a fever.  · A wound that was closed breaks open.  · You notice a bad smell coming from your wound or your dressing.  · You notice something coming out of the wound, such as wood or glass.  · Your pain is not controlled with medicine.  · You have increased redness, swelling, or pain at the site of your wound.  · You have fluid, blood, or pus coming from your wound.  · You notice a change in the color of your skin near your wound.  · You need to change the dressing frequently due to fluid, blood, or pus draining from the wound.  · You develop a new rash.  · You develop numbness around the wound.  SEEK IMMEDIATE MEDICAL CARE IF:  · You develop severe swelling around the wound.  · Your pain suddenly increases and is severe.  · You develop painful lumps near the wound or on skin that is anywhere on your body.  · You have a red streak going away from your wound.  · The wound is on your hand or foot and you cannot properly move a finger or toe.  · The wound is on your hand or foot and you notice that your fingers or toes look pale or bluish.     This information is not intended to replace advice given to you by your health care provider. Make sure you discuss any questions you have with your health care provider.    Suture removal in 9-10 days.     Document Released: 12/18/2006 Document Revised: 05/03/2016 Document Reviewed: 12/14/2015  [employer] Interactive Patient Education ©2016 [employer]

## 2017-09-12 NOTE — ED PROVIDER NOTES
ED Provider Note    Scribed for Francis Poon M.D. by Rafi Lynch. 9/11/2017, 6:38 PM.    Primary care provider: Pcp Pt States None  Means of arrival: Walk In  History obtained from: Patient  History limited by: None    CHIEF COMPLAINT  Chief Complaint   Patient presents with   • Wrist Laceration     on glass window       HPI  Rafi Zapata Jr. is a 27 y.o. male who presents to the Emergency Department for evaluation of right wrist laceration. Patient states he punched a glass window earlier today after being frustrated. Denies any associated numbness or tingling. Patient notes pain is exacerbated when making a fist. Patient states tetanus is up to date. Patient denies suicidal ideation. He has no other complaints at this time.     REVIEW OF SYSTEMS  Review of Systems   Musculoskeletal:        + Right hand pain   + Right wrist laceration    Skin:        + Right wrist laceration    Neurological: Negative for tingling.        - right hand numbness        PAST MEDICAL HISTORY   has a past medical history of ASTHMA.    SURGICAL HISTORY  patient denies any surgical history    SOCIAL HISTORY  Social History   Substance Use Topics   • Smoking status: Current Every Day Smoker   • Smokeless tobacco: Not on file   • Alcohol use No      History   Drug Use   • Types: Inhaled       FAMILY HISTORY  History reviewed. No pertinent family history.    CURRENT MEDICATIONS  Home Medications     Reviewed by Lyly Rayo R.N. (Registered Nurse) on 09/11/17 at 1808  Med List Status: Partial   Medication Last Dose Status   albuterol (VENTOLIN OR PROVENTIL) 108 (90 BASE) MCG/ACT AERS inhalation aerosol  Active   albuterol 108 (90 BASE) MCG/ACT Aero Soln inhalation aerosol  Active   amoxicillin (AMOXIL) 875 MG tablet  Active   azithromycin (ZITHROMAX) 250 MG Tab  Active   azithromycin (ZITHROMAX) 250 MG TABS  Active   ciprofloxacin (CIPRO) 500 MG Tab  Active   guaifenesin-codeine (CHERATUSSIN AC) SOLN  Active    hydrocodone-acetaminophen (NORCO) 7.5-325 MG per tablet  Active   methylPREDNISolone (MEDROL DOSEPACK) 4 MG tablet  Active   oxycodone-acetaminophen (PERCOCET) 5-325 MG TABS  Active   Oxycodone-Acetaminophen (PERCOCET-10)  MG TABS  Active   penicillin v potassium (VEETID) 500 MG TABS  Active   tramadol (ULTRAM) 50 MG Tab  Active                ALLERGIES  No Known Allergies    PHYSICAL EXAM  VITAL SIGNS: /71   Pulse 97   Temp 37 °C (98.6 °F) (Temporal)   Resp 16   Ht 1.829 m (6')   Wt 66 kg (145 lb 8.1 oz)   SpO2 96%   BMI 19.73 kg/m²     Constitutional: Well developed, Well nourished, No acute distress, Non-toxic appearance.   HENT: Normocephalic, Atraumatic, Bilateral external ears normal, Oropharynx moist, no evidence of dehydration, No oral exudates, Nose normal.   Eyes: PERRLA, EOMI, Conjunctiva normal, No discharge.   Neck: Normal range of motion, No tenderness, Supple, No stridor. No masses. No evidence of meningitis or meningismus.   Lymphatic: No lymphadenopathy noted.   Thorax & Lungs: Non Labored respirations Skin: Warm, Dry, No rash. No exanthem.   Extremities:  No edema, No cyanosis, No clubbing. 3 parallel lacerations the risks did not seem to involve tendon or obvious foreign body.  Musculoskeletal: Good range of motion in all major joints. No major deformities noted.   Neurologic: Alert & oriented x 3, Normal motor function, No focal deficits noted.   Psychiatric: Affect normal, mood normal.          DIAGNOSTIC STUDIES / PROCEDURES  DX-WRIST-COMPLETE 3+ RIGHT   Final Result      No evidence of fracture or dislocation.   Soft tissue swelling and laceration are noted.                                                                      Laceration Repair Procedure Note    Indication: Lacerations X3    Procedure: The patient was placed in the appropriate position and anesthesia around the laceration was obtained by infiltration using 2% Lidocaine without epinephrine. The area was then  irrigated with normal saline.The wound was explored and no obvious foreign body The distal edge of the 2nd laceration was debrided as it was quite thin skin. The laceration was closed with 5-0 Ethilon using interrupted sutures. A 2nd laceration was closed with 5-0 Ethilon in using interrupted sutures A third laceration was closed with 5-0 Ethilon using interrupted sutures. The wound area was then dressed with a sterile dressing.  Laceration 3.5/3.5/1 cm    Total repaired wound length: 8 cm.     Other Items: None    The patient tolerated the procedure well.    Complications: None          COURSE & MEDICAL DECISION MAKING  Nursing notes, VS, PMSFHx reviewed in chart.    6:38 PM - Patient seen and examined at bedside.     7:28 PM - Laceration repair performed at this time      I reviewed prescription monitoring program for patient's narcotic use before prescribing a scheduled drug.The patient will not drink alcohol nor drive with prescribed medications. The patient will return for new or worsening symptoms and is stable at the time of discharge.    The patient is referred to a primary physician for blood pressure management, diabetic screening, and for all other preventative health concerns.    DISPOSITION:  Patient will be discharged home in stable condition.    FOLLOW UP:  Patient is to have follow-up suture removal in 9-10 days. Instruction sheet on wound care. Instruction sheet on laceration repair.  OUTPATIENT MEDICATIONS:  Vicodin for pain     FINAL IMPRESSION  1. Wrist laceration, right, initial encounter         Rafi ELLISON (Lynnette), am scribing for, and in the presence of, Francis Poon M.D..    Electronically signed by: Rafi Tena), 9/11/2017    Francis ELLISON M.D. personally performed the services described in this documentation, as scribed by Rafi Lynch in my presence, and it is both accurate and complete.    The note accurately reflects work and decisions made by me.  Francis ESTEBAN  [place]  9/11/2017  9:01 PM

## 2017-09-12 NOTE — ED NOTES
All discharge instructions given to pt as well as Rx for norco. Pt verbalized understanding of all discharge instructions. Pt advised not to drink and drive while taking narcotics. All questions answered.

## 2017-11-08 ENCOUNTER — HOSPITAL ENCOUNTER (EMERGENCY)
Facility: MEDICAL CENTER | Age: 27
End: 2017-11-08
Attending: EMERGENCY MEDICINE

## 2017-11-08 VITALS
HEART RATE: 104 BPM | HEIGHT: 72 IN | BODY MASS INDEX: 19.8 KG/M2 | OXYGEN SATURATION: 96 % | SYSTOLIC BLOOD PRESSURE: 112 MMHG | TEMPERATURE: 97.5 F | RESPIRATION RATE: 16 BRPM | WEIGHT: 146.16 LBS | DIASTOLIC BLOOD PRESSURE: 72 MMHG

## 2017-11-08 DIAGNOSIS — J45.21 MILD INTERMITTENT ASTHMA WITH ACUTE EXACERBATION: ICD-10-CM

## 2017-11-08 PROCEDURE — 700102 HCHG RX REV CODE 250 W/ 637 OVERRIDE(OP): Mod: EDC | Performed by: EMERGENCY MEDICINE

## 2017-11-08 PROCEDURE — 99283 EMERGENCY DEPT VISIT LOW MDM: CPT | Mod: EDC

## 2017-11-08 PROCEDURE — A9270 NON-COVERED ITEM OR SERVICE: HCPCS | Mod: EDC | Performed by: EMERGENCY MEDICINE

## 2017-11-08 RX ORDER — ALBUTEROL SULFATE 90 UG/1
2 AEROSOL, METERED RESPIRATORY (INHALATION) ONCE
Status: COMPLETED | OUTPATIENT
Start: 2017-11-08 | End: 2017-11-08

## 2017-11-08 RX ADMIN — ALBUTEROL SULFATE 2 PUFF: 90 AEROSOL, METERED RESPIRATORY (INHALATION) at 09:05

## 2017-11-08 NOTE — DISCHARGE INSTRUCTIONS
Asthma, Adult  Asthma is a condition of the lungs in which the airways tighten and narrow. Asthma can make it hard to breathe. Asthma cannot be cured, but medicine and lifestyle changes can help control it. Asthma may be started (triggered) by:  · Animal skin flakes (dander).  · Dust.  · Cockroaches.  · Pollen.  · Mold.  · Smoke.  · Cleaning products.  · Hair sprays or aerosol sprays.  · Paint fumes or strong smells.  · Cold air, weather changes, and winds.  · Crying or laughing hard.  · Stress.  · Certain medicines or drugs.  · Foods, such as dried fruit, potato chips, and sparkling grape juice.  · Infections or conditions (colds, flu).  · Exercise.  · Certain medical conditions or diseases.  · Exercise or tiring activities.  HOME CARE   · Take medicine as told by your doctor.  · Use a peak flow meter as told by your doctor. A peak flow meter is a tool that measures how well the lungs are working.  · Record and keep track of the peak flow meter's readings.  · Understand and use the asthma action plan. An asthma action plan is a written plan for taking care of your asthma and treating your attacks.  · To help prevent asthma attacks:  ¨ Do not smoke. Stay away from secondhand smoke.  ¨ Change your heating and air conditioning filter often.  ¨ Limit your use of fireplaces and wood stoves.  ¨ Get rid of pests (such as roaches and mice) and their droppings.  ¨ Throw away plants if you see mold on them.  ¨ Clean your floors. Dust regularly. Use cleaning products that do not smell.  ¨ Have someone vacuum when you are not home. Use a vacuum  with a HEPA filter if possible.  ¨ Replace carpet with wood, tile, or vinyl jaja. Carpet can trap animal skin flakes and dust.  ¨ Use allergy-proof pillows, mattress covers, and box spring covers.  ¨ Wash bed sheets and blankets every week in hot water and dry them in a dryer.  ¨ Use blankets that are made of polyester or cotton.  ¨ Clean bathrooms and chantelle with bleach.  If possible, have someone repaint the walls in these rooms with mold-resistant paint. Keep out of the rooms that are being cleaned and painted.  ¨ Wash hands often.  GET HELP IF:  · You have make a whistling sound when breaking (wheeze), have shortness of breath, or have a cough even if taking medicine to prevent attacks.  · The colored mucus you cough up (sputum) is thicker than usual.  · The colored mucus you cough up changes from clear or white to yellow, green, gray, or bloody.  · You have problems from the medicine you are taking such as:  ¨ A rash.  ¨ Itching.  ¨ Swelling.  ¨ Trouble breathing.  · You need reliever medicines more than 2-3 times a week.  · Your peak flow measurement is still at 50-79% of your personal best after following the action plan for 1 hour.  · You have a fever.  GET HELP RIGHT AWAY IF:   · You seem to be worse and are not responding to medicine during an asthma attack.  · You are short of breath even at rest.  · You get short of breath when doing very little activity.  · You have trouble eating, drinking, or talking.  · You have chest pain.  · You have a fast heartbeat.  · Your lips or fingernails start to turn blue.  · You are light-headed, dizzy, or faint.  · Your peak flow is less than 50% of your personal best.  MAKE SURE YOU:   · Understand these instructions.  · Will watch your condition.  · Will get help right away if you are not doing well or get worse.     This information is not intended to replace advice given to you by your health care provider. Make sure you discuss any questions you have with your health care provider.     Document Released: 06/05/2009 Document Revised: 01/08/2016 Document Reviewed: 07/17/2014  [employer] Interactive Patient Education ©2016 [employer]

## 2017-11-08 NOTE — ED PROVIDER NOTES
ED Provider Note    CHIEF COMPLAINT  Chief Complaint   Patient presents with   • Asthma     this morning. Reports improvement now but states he is out of his inhaler.   • Medical Clearance     employer requests medical clearance to resume work.       HPI  Rafi Zapata Jr. is a 27 y.o. male who presentsFor medical clearance to go to work. He has a history of asthma. He felt like he was wheezing earlier. His boss saw him short of breath. Because told him that he needed to come to the ER to be checked out. He drank a rockstar en route to the hospital and feels better now. He does work outside and exposed to allergens and dust. No fevers or recent illness. No sore throat congestion or significant cough. No ongoing symptoms.    REVIEW OF SYSTEMS  Pertinent negative: As per HPI    PAST MEDICAL HISTORY  Past Medical History:   Diagnosis Date   • ASTHMA        SOCIAL HISTORY  Social History   Substance Use Topics   • Smoking status: Current Every Day Smoker   • Smokeless tobacco: Not on file   • Alcohol use No       SURGICAL HISTORY  No past surgical history on file.    ALLERGIES  No Known Allergies    PHYSICAL EXAM  VITAL SIGNS: /74   Pulse (!) 109   Temp 36.8 °C (98.2 °F)   Resp 14   Ht 1.829 m (6')   Wt 66.3 kg (146 lb 2.6 oz)   SpO2 99%   BMI 19.82 kg/m²    Constitutional: Awake and alert. Nontoxic  HENT:  Grossly normal, pharynx normal, multiple piercings  Eyes: Grossly normal  Neck: Normal range of motion  Cardiovascular: Normal heart rate   Thorax & Lungs: No respiratory distress: Fields are clear throughout  Skin:  No pathologic rash.   Extremities: Well perfused  Psychiatric: Affect normal      COURSE & MEDICAL DECISION MAKING  Patient presents with reports of wheezing prior to arrival. His lung fields are clear currently. He appears nontoxic is vital signs are stable. He does not have an inhaler. He does not have the money to purchase one. He will be given an inhaler here in the ER with a spacer and  instructions. He is referred to primary doctor. He is advised to return to the ER for concern.    Patient referred to primary provider for blood pressure management    FINAL IMPRESSION  1. Asthma      Disposition: home in good condition      This dictation was created using voice recognition software. The accuracy of the dictation is limited to the abilities of the software.  The nursing notes were reviewed and certain aspects of this information were incorporated into this note.      Electronically signed by: Jm Chaves, 11/8/2017 8:52 AM

## 2017-11-08 NOTE — ED NOTES
"Pt ambulatory to Peds 40. Agree with triage RN note. Declined to change into gown. Pt alert, pink, interactive and in NAD. Respirations even and unlabored, lungs CTA. No increased WOB or cough noted. Pt reports cough x 1 week, denies fevers or vomiting. Pt reports he \"had an attack at work and my boss made me come in, I drank a rockstar and that helped. , pt states \"yeah, that's because I drank my rockstar so fast\". Family at bedside. Call light within reach. Denies additional needs. ERP immediately to bedside.     "

## 2017-11-08 NOTE — LETTER
Emergency Services     November 8, 2017    Patient: Rafi Zapata Jr.   YOB: 1990   Date of Visit: 11/8/2017       To Whom It May Concern:    Rafi Zapata was seen and treated in our emergency department on 11/8/2017. He may return to work on 11/09/17.    Sincerely,     ANSELMO BARRERA M.D.  [facility], EMERGENCY DEPT  Dept: [phone]

## 2017-11-08 NOTE — ED NOTES
Pt discharged alert and interactive. Discharge teaching provided to patient. Reviewed home care, importance of hydration and when to return to ED with worsening symptoms. Reviewed importance of follow up care with PCP for further medication refills. All questions answered, verbalizes understanding to all teaching. Pt ambulatory our of department in stable condition.

## 2017-11-08 NOTE — ED NOTES
Chief Complaint   Patient presents with   • Asthma     this morning. Reports improvement now but states he is out of his inhaler.   • Medical Clearance     employer requests medical clearance to resume work.     Ambulatory to triage for above. NAD, speaking full sentences. Explained triage process, to waiting room. Asked to inform RN if questions or concerns arise.

## 2017-11-09 ENCOUNTER — PATIENT OUTREACH (OUTPATIENT)
Dept: HEALTH INFORMATION MANAGEMENT | Facility: OTHER | Age: 27
End: 2017-11-09

## 2018-01-02 ENCOUNTER — NON-PROVIDER VISIT (OUTPATIENT)
Dept: URGENT CARE | Facility: CLINIC | Age: 28
End: 2018-01-02

## 2018-01-02 DIAGNOSIS — Z02.1 PRE-EMPLOYMENT DRUG SCREENING: ICD-10-CM

## 2018-01-02 DIAGNOSIS — Z02.1 PRE-EMPLOYMENT DRUG TESTING: ICD-10-CM

## 2018-01-02 LAB
AMP AMPHETAMINE: NORMAL
COC COCAINE: NORMAL
INT CON NEG: NEGATIVE
INT CON POS: POSITIVE
MET METHAMPHETAMINES: NORMAL
OPI OPIATES: NORMAL
PCP PHENCYCLIDINE: NORMAL
POC DRUG COMMENT 753798-OCCUPATIONAL HEALTH: NEGATIVE
THC: NORMAL

## 2018-01-02 PROCEDURE — 80305 DRUG TEST PRSMV DIR OPT OBS: CPT | Performed by: NURSE PRACTITIONER

## 2019-12-24 NOTE — ED NOTES
ERP at bedside.   [0 - No Distress] : Distress Level: 0 [de-identified] : 30 yo F with recently diagnosed LLE DVT presents for follow up visit. \par \par She was hospitalized at 34 wks gestation at [facility] (2018) with LLE swelling and pain, found to have new left-sided DVT (common femoral, femoral, greater saphenous). SHe was started on Lovenox injections twice daily. Patient had APL testing-- all reportedly positive. \par  She was transferred to [facility] at approx 36 weeks, as there was concern for underlying May Thurner syndrome. She was evaluated by MFM who determined that there is no indication for urgent delivery. She was hemodynamically stable and thus thrombectomy vs. thrombolysis was not pursued.\par She underwent  on 3/5/18 due to breech position with no complications. \par 2019 LA positive, anticardiolipin antibody IgG 63.8 , anticardiolipin antibody IgM 14.2. B 2 glycoprotein IgG 31.9. \par  \par \par    [de-identified] : \par Denies dyspnea, chest pain, palpitations, LE swelling, abdominal pain. Patient is on Coumadin 12 and 14 mg alternating days. No bleeding. \par \par No other changes in her medical, surgical, social history since 9/24/19.\par

## 2021-12-09 ENCOUNTER — HOSPITAL ENCOUNTER (EMERGENCY)
Facility: MEDICAL CENTER | Age: 31
End: 2021-12-09
Attending: EMERGENCY MEDICINE

## 2021-12-09 VITALS
HEIGHT: 71 IN | SYSTOLIC BLOOD PRESSURE: 147 MMHG | DIASTOLIC BLOOD PRESSURE: 91 MMHG | HEART RATE: 97 BPM | WEIGHT: 196.65 LBS | BODY MASS INDEX: 27.53 KG/M2 | RESPIRATION RATE: 18 BRPM | TEMPERATURE: 97 F | OXYGEN SATURATION: 98 %

## 2021-12-09 DIAGNOSIS — R51.9 RIGHT FACIAL PAIN: ICD-10-CM

## 2021-12-09 DIAGNOSIS — J01.90 ACUTE BACTERIAL SINUSITIS: ICD-10-CM

## 2021-12-09 DIAGNOSIS — R50.9 SUBJECTIVE FEVER: ICD-10-CM

## 2021-12-09 DIAGNOSIS — R05.9 COUGH: ICD-10-CM

## 2021-12-09 DIAGNOSIS — B96.89 ACUTE BACTERIAL SINUSITIS: ICD-10-CM

## 2021-12-09 DIAGNOSIS — J34.89 FRONTAL SINUS PAIN: ICD-10-CM

## 2021-12-09 DIAGNOSIS — R09.89 CHEST CONGESTION: ICD-10-CM

## 2021-12-09 DIAGNOSIS — H61.23 BILATERAL IMPACTED CERUMEN: ICD-10-CM

## 2021-12-09 PROCEDURE — 99282 EMERGENCY DEPT VISIT SF MDM: CPT

## 2021-12-09 RX ORDER — LORATADINE 10 MG
2 CAPSULE ORAL 2 TIMES DAILY
Qty: 14.7 ML | Refills: 0 | Status: SHIPPED | OUTPATIENT
Start: 2021-12-09 | End: 2021-12-12

## 2021-12-09 RX ORDER — PSEUDOEPHEDRINE HCL 120 MG/1
120 TABLET, FILM COATED, EXTENDED RELEASE ORAL 2 TIMES DAILY PRN
Qty: 10 TABLET | Refills: 0 | Status: SHIPPED | OUTPATIENT
Start: 2021-12-09 | End: 2021-12-14

## 2021-12-09 RX ORDER — AMOXICILLIN AND CLAVULANATE POTASSIUM 875; 125 MG/1; MG/1
1 TABLET, FILM COATED ORAL 2 TIMES DAILY
Qty: 10 TABLET | Refills: 0 | Status: SHIPPED | OUTPATIENT
Start: 2021-12-09 | End: 2021-12-14

## 2021-12-09 RX ORDER — BENZONATATE 200 MG/1
200 CAPSULE ORAL 3 TIMES DAILY PRN
Qty: 30 CAPSULE | Refills: 0 | Status: SHIPPED | OUTPATIENT
Start: 2021-12-09 | End: 2021-12-19

## 2021-12-09 NOTE — Clinical Note
Rafi Benny was seen and treated in our emergency department on 12/9/2021.  He may return to work on 12/10/2021.       If you have any questions or concerns, please don't hesitate to call.      Alfredito Valdivia M.D.

## 2021-12-10 NOTE — ED PROVIDER NOTES
"ED Provider Note    CHIEF COMPLAINT  Chief Complaint   Patient presents with   • Sinus Pain     \"front of face\" for 2 days. congested with \"pastel green\" drainage. Reports + for cough. Denies antibiotic use, fevers.      HPI  Patient is an otherwise healthy 31-year-old male with a remote history of asthma presents emergency room for evaluation of ongoing sinus congestion, cough, hearing changes and facial pain.  Patient states this all started approximately 13 to 14 days ago when the patient does state cough with some congestion and felt like he slowly improved from this however all of his symptoms have migrated up to his face where he has had facial tenderness, subjective fevers with no chills, no measured fevers, ongoing sinus drainage, pain and minimal headache.  No recent sick contacts, all of the symptoms are made worse as he works night shifts and is constantly on his feet for many hours at a time.  Denies any syncope, no visual changes, no ocular pain, no chest pain or ongoing shortness of breath.    PPE Note: I personally donned full PPE for all patient encounters during this visit, including being clean-shaven with an N95 respirator mask, gloves, and goggles.     REVIEW OF SYSTEMS  See HPI for further details. All other systems are negative.     PAST MEDICAL HISTORY   has a past medical history of ASTHMA.    SOCIAL HISTORY  Social History     Tobacco Use   • Smoking status: Current Every Day Smoker     Packs/day: 1.00     Types: Cigarettes   • Smokeless tobacco: Never Used   Substance and Sexual Activity   • Alcohol use: No   • Drug use: Yes     Types: Inhaled     Comment: marijuana   • Sexual activity: Not on file     SURGICAL HISTORY  patient denies any surgical history    CURRENT MEDICATIONS  Home Medications    **Home medications have not yet been reviewed for this encounter**       ALLERGIES  No Known Allergies    PHYSICAL EXAM  VITAL SIGNS: /67   Pulse 89   Temp 37.1 °C (98.7 °F) (Temporal)   " "Resp 18   Ht 1.803 m (5' 11\")   Wt 89.2 kg (196 lb 10.4 oz)   SpO2 96%   BMI 27.43 kg/m²   Pulse ox interpretation: I interpret this pulse ox as normal.  Genl: M sitting in chair comfortably, speaking clearly, appears in very mild distress   Head: NC/AT   ENT: Mucous membranes moist, posterior pharynx erythematous, lateral tympanic membranes are obscured by large cerumen impactions.  No conductive hearing loss. No tonsillar enlargement purulence noted, bilateral nares are patent, turbinates are swollen with moderate amounts of discharge, worse on the right than left.  He has slight right maxillary and frontal sinus pain with palpation.    Eyes: Normal sclera, pupils equal round reactive to light  Neck: Supple, FROM, no LAD appreciated  Pulmonary: Lungs are clear to auscultation bilaterally  Chest: No TTP  CV:  RRR, no murmur appreciated  Abdomen: soft, NT/ND; no rebound/guarding  Musculoskeletal: Pain free ROM of the neck. Moving upper and lower extremities and spontaneous in coordinated fashion  Neuro: A&Ox4 (person, place, time, situation), speech fluent, gait steady, no focal deficits appreciated  Skin: No rash or lesions.  No pallor or jaundice.  No cyanosis.  Warm and dry.     DIAGNOSTIC STUDIES / PROCEDURES    LABS  Labs Reviewed - No data to display    RADIOLOGY  No orders to display     COURSE & MEDICAL DECISION MAKING  Pertinent Labs & Imaging studies reviewed. (See chart for details)    DDX: Sinusitis, viral syndrome, cerumen impaction, bacterial sinusitis    MDM    Initial evaluation at 1654:  Patient presents emergency room for symptoms as described above.  Patient reports having escalating discomfort of overall symptomology it sounds like it starts like a viral syndrome with cough congestion and changes in the posterior pharynx that appear to be viral in nature.  Persistent pain and congestion with ongoing discharge and subjective fevers along with easily reproducible pain over the sinuses makes me " think that this prolonged course of greater than 10 days duration in addition to the subjective fever radiology would make me more inclined to treat as early bacterial sinusitis.  Patient has no focal neurological deficits, he has normal vital signs at this time though he took antipyretics prior to arrival.  With the timing and best use practices I would advise him to take symptomatic medications, initiate a course of antibiotics at this time and follow-up with his outpatient provider or return promptly to the emergency room should he have breakthrough fevers, worsening pain, ocular discomfort, worsening headaches or any other evolving symptomology.  Questions are addressed and he is discharged home in stable condition.    The patient will not drink alcohol nor drive with prescribed medications. The patient will return for worsening symptoms and is stable at the time of discharge. The patient verbalizes understanding and will comply.    FINAL IMPRESSION  Visit Diagnoses     ICD-10-CM   1. Subjective fever  R50.9   2. Right facial pain  R51.9   3. Chest congestion  R09.89   4. Acute bacterial sinusitis  J01.90    B96.89   5. Frontal sinus pain  J34.89   6. Cough  R05.9   7. Bilateral impacted cerumen  H61.23       Electronically signed by: Alfredito Valdivia M.D., 12/9/2021 4:54 PM

## 2021-12-16 ENCOUNTER — HOSPITAL ENCOUNTER (EMERGENCY)
Facility: MEDICAL CENTER | Age: 31
End: 2021-12-16
Attending: EMERGENCY MEDICINE

## 2021-12-16 VITALS
OXYGEN SATURATION: 95 % | SYSTOLIC BLOOD PRESSURE: 136 MMHG | WEIGHT: 196.65 LBS | TEMPERATURE: 97.4 F | BODY MASS INDEX: 27.53 KG/M2 | RESPIRATION RATE: 16 BRPM | HEIGHT: 71 IN | DIASTOLIC BLOOD PRESSURE: 87 MMHG | HEART RATE: 78 BPM

## 2021-12-16 DIAGNOSIS — R07.9 CHEST PAIN, UNSPECIFIED TYPE: ICD-10-CM

## 2021-12-16 DIAGNOSIS — F41.9 ANXIETY: ICD-10-CM

## 2021-12-16 LAB — EKG IMPRESSION: NORMAL

## 2021-12-16 PROCEDURE — 99283 EMERGENCY DEPT VISIT LOW MDM: CPT

## 2021-12-16 PROCEDURE — 93005 ELECTROCARDIOGRAM TRACING: CPT | Performed by: EMERGENCY MEDICINE

## 2021-12-16 NOTE — ED NOTES
Pt provided with discharge paper work and follow up care. Pt declines questions at this time. Pt ambulated out of ER.

## 2021-12-16 NOTE — ED PROVIDER NOTES
ED Provider Note    ER Provider Note         CHIEF COMPLAINT  Chief Complaint   Patient presents with   • Numbness     into arm and feet (This has been on and off for a week)   • Chest Pain     with breath feels like a stabbing pain (on and off for a week )       HPI  Rafi Zapata Jr. is a 31 y.o. male who presents to the Emergency Department with intermittent chest pain as well as intermittent numbness in his arms and legs.  The patient says he is very anxious.  The patient said he was in a golf about a week and he was drinking lots of energy drinks as well as taking Augmentin.  He denies any shortness of breath at this time.  He says that the chest pain is stabbing when it comes but now he does not have anything.  He denies any leg swelling.  He denies any cough at this time but did have some prior.  There is no significant shortness of breath at this time.  The patient does smoke cigarettes and does use marijuana quite frequently.  He says he stopped all these things but still has some the symptoms and he is very concerned.  He feels like this is likely anxiety related but is unsure and wants some help.    REVIEW OF SYSTEMS  See HPI for further details. All other systems are negative.     PAST MEDICAL HISTORY   has a past medical history of ASTHMA.    SURGICAL HISTORY  patient denies any surgical history    SOCIAL HISTORY  Social History     Tobacco Use   • Smoking status: Current Every Day Smoker     Packs/day: 1.00     Types: Cigarettes   • Smokeless tobacco: Never Used   Vaping Use   • Vaping Use: Never used   Substance Use Topics   • Alcohol use: No   • Drug use: Not Currently     Types: Inhaled     Comment: marijuana      Social History     Substance and Sexual Activity   Drug Use Not Currently   • Types: Inhaled    Comment: marijuana       FAMILY HISTORY  History reviewed. No pertinent family history.    CURRENT MEDICATIONS  Home Medications    **Home medications have not yet been reviewed for this  "encounter**         ALLERGIES  No Known Allergies    PHYSICAL EXAM  VITAL SIGNS: /74   Pulse 88   Temp 36.7 °C (98 °F) (Oral)   Resp 16   Ht 1.803 m (5' 11\")   Wt 89.2 kg (196 lb 10.4 oz)   SpO2 96%   BMI 27.43 kg/m²      Constitutional: Alert in no apparent distress.  Anxious appearing.  HENT: No signs of trauma, Bilateral external ears normal, Nose normal.   Eyes: Pupils are equal, Conjunctiva normal, Non-icteric.   Neck: Normal range of motion, No tenderness, Supple, No stridor.   Lymphatic: No lymphadenopathy noted.   Cardiovascular: Regular rate and rhythm, nondisplaced PMI  Thorax & Lungs: No respiratory distress,  No chest tenderness.   Abdomen: Bowel sounds normal, Soft, No tenderness, No masses, No pulsatile masses. No peritoneal signs.  Skin: Warm, Dry, No erythema, No rash.   Back: No bony tenderness, No CVA tenderness.   Extremities: Intact distal pulses, No edema, No tenderness, No cyanosis.  Musculoskeletal: Good range of motion in all major joints. No tenderness to palpation or major deformities noted.   Neurologic: Alert , Normal motor function, Normal sensory function, No focal deficits noted.   Psychiatric: Anxious appearing.    DIAGNOSTIC STUDIES / PROCEDURES    EKG Interpretation:  Interpreted by me  Sinus with a rate of 66 with no significant ST  or depression.  The patient does have an RSR prime in leads V1 and V2 which is likely a normal variant.  Patient has slight J-point elevation anteriorly however there were no reciprocal changes and this appears to be a normal early repolarization pattern.         COURSE & MEDICAL DECISION MAKING  Pertinent Labs & Imaging studies reviewed. (See chart for details)    This is a 31 y.o. male that presents with intermittent chest pain as well as intermittent numbness in extremities.  At this time the patient has no history of sudden cardiac death in the family nor any history of sympathomimetics such as cocaine or methamphetamine.  I do believe " this is anxiety that is likely driven by his significant usage of energy drinks as well as marijuana.  I will encourage him to stop utilizing these substances as much as possible.  We will get a screening EKG.  He has a normal lung exam and I do not believe this is PE or pneumothorax or pneumonia..     2:21 AM - Patient seen and examined at bedside.    Patient has a negative and normal looking EKG.  The patient is PERC negative.  He was counseled on his utilization of smoking as well as energy drinks and he was given strict return precautions as well as follow-up.    I believe this is likely anxiety related.  I will have him follow-up with primary care physician.      FINAL IMPRESSION  1. Chest pain, unspecified type    2. Anxiety              Electronically signed by: Toby Goddard M.D., 12/16/2021

## 2021-12-16 NOTE — ED TRIAGE NOTES
"Chief Complaint   Patient presents with   • Numbness     into arm and feet (This has been on and off for a week)   • Chest Pain     with breath feels like a stabbing pain (on and off for a week )     /74   Pulse 88   Temp 36.7 °C (98 °F) (Oral)   Resp 16   Ht 1.803 m (5' 11\")   Wt 89.2 kg (196 lb 10.4 oz)   SpO2 96%   BMI 27.43 kg/m²       "

## 2021-12-16 NOTE — ED NOTES
"Pt was seen 7 days ago for a sinus infection. He was prescribed augmentin and states, \" I was drinking red bulls while taking the medication, and being a hypochondriac, I googled that this could have had lasting effects on my anxiety\". Patient does say he has anxiety, and says he feels as if he has been having anxiety attacks for the last week, and is currently having one now.   "

## 2023-08-13 ENCOUNTER — HOSPITAL ENCOUNTER (EMERGENCY)
Facility: MEDICAL CENTER | Age: 33
End: 2023-08-14
Attending: EMERGENCY MEDICINE
Payer: COMMERCIAL

## 2023-08-13 DIAGNOSIS — S09.90XA CLOSED HEAD INJURY, INITIAL ENCOUNTER: ICD-10-CM

## 2023-08-13 DIAGNOSIS — R56.9 SEIZURE-LIKE ACTIVITY (HCC): ICD-10-CM
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
## 2023-08-13 DIAGNOSIS — S01.81XA FACIAL LACERATION, INITIAL ENCOUNTER: ICD-10-CM

## 2023-08-13 LAB — EKG IMPRESSION: NORMAL

## 2023-08-13 PROCEDURE — 99284 EMERGENCY DEPT VISIT MOD MDM: CPT

## 2023-08-14 ENCOUNTER — APPOINTMENT (OUTPATIENT)
Dept: RADIOLOGY | Facility: MEDICAL CENTER | Age: 33
End: 2023-08-14
Attending: EMERGENCY MEDICINE
Payer: COMMERCIAL

## 2023-08-14 VITALS
HEART RATE: 82 BPM | BODY MASS INDEX: 33.6 KG/M2 | DIASTOLIC BLOOD PRESSURE: 66 MMHG | TEMPERATURE: 97.7 F | HEIGHT: 71 IN | RESPIRATION RATE: 18 BRPM | WEIGHT: 240 LBS | OXYGEN SATURATION: 94 % | SYSTOLIC BLOOD PRESSURE: 116 MMHG

## 2023-08-14 LAB
ALBUMIN SERPL BCP-MCNC: 4.4 G/DL (ref 3.2–4.9)
ALBUMIN/GLOB SERPL: 1.7 G/DL
ALP SERPL-CCNC: 93 U/L (ref 30–99)
ALT SERPL-CCNC: 30 U/L (ref 2–50)
ANION GAP SERPL CALC-SCNC: 13 MMOL/L (ref 7–16)
AST SERPL-CCNC: 23 U/L (ref 12–45)
BASOPHILS # BLD AUTO: 0.9 % (ref 0–1.8)
BASOPHILS # BLD: 0.11 K/UL (ref 0–0.12)
BILIRUB SERPL-MCNC: 0.3 MG/DL (ref 0.1–1.5)
BUN SERPL-MCNC: 12 MG/DL (ref 8–22)
CALCIUM ALBUM COR SERPL-MCNC: 9.4 MG/DL (ref 8.5–10.5)
CALCIUM SERPL-MCNC: 9.7 MG/DL (ref 8.5–10.5)
CHLORIDE SERPL-SCNC: 106 MMOL/L (ref 96–112)
CK SERPL-CCNC: 196 U/L (ref 0–154)
CO2 SERPL-SCNC: 22 MMOL/L (ref 20–33)
CREAT SERPL-MCNC: 0.93 MG/DL (ref 0.5–1.4)
D DIMER PPP IA.FEU-MCNC: 0.3 UG/ML (FEU) (ref 0–0.5)
EOSINOPHIL # BLD AUTO: 0.31 K/UL (ref 0–0.51)
EOSINOPHIL NFR BLD: 2.7 % (ref 0–6.9)
ERYTHROCYTE [DISTWIDTH] IN BLOOD BY AUTOMATED COUNT: 43.6 FL (ref 35.9–50)
GFR SERPLBLD CREATININE-BSD FMLA CKD-EPI: 111 ML/MIN/1.73 M 2
GLOBULIN SER CALC-MCNC: 2.6 G/DL (ref 1.9–3.5)
GLUCOSE SERPL-MCNC: 122 MG/DL (ref 65–99)
HCT VFR BLD AUTO: 48.6 % (ref 42–52)
HGB BLD-MCNC: 17.2 G/DL (ref 14–18)
IMM GRANULOCYTES # BLD AUTO: 0.04 K/UL (ref 0–0.11)
IMM GRANULOCYTES NFR BLD AUTO: 0.3 % (ref 0–0.9)
LACTATE SERPL-SCNC: 2.4 MMOL/L (ref 0.5–2)
LYMPHOCYTES # BLD AUTO: 3.83 K/UL (ref 1–4.8)
LYMPHOCYTES NFR BLD: 33 % (ref 22–41)
MCH RBC QN AUTO: 32.5 PG (ref 27–33)
MCHC RBC AUTO-ENTMCNC: 35.4 G/DL (ref 32.3–36.5)
MCV RBC AUTO: 91.9 FL (ref 81.4–97.8)
MONOCYTES # BLD AUTO: 0.76 K/UL (ref 0–0.85)
MONOCYTES NFR BLD AUTO: 6.6 % (ref 0–13.4)
NEUTROPHILS # BLD AUTO: 6.54 K/UL (ref 1.82–7.42)
NEUTROPHILS NFR BLD: 56.5 % (ref 44–72)
NRBC # BLD AUTO: 0 K/UL
NRBC BLD-RTO: 0 /100 WBC (ref 0–0.2)
PLATELET # BLD AUTO: 230 K/UL (ref 164–446)
PMV BLD AUTO: 10.7 FL (ref 9–12.9)
POTASSIUM SERPL-SCNC: 3.9 MMOL/L (ref 3.6–5.5)
PROT SERPL-MCNC: 7 G/DL (ref 6–8.2)
RBC # BLD AUTO: 5.29 M/UL (ref 4.7–6.1)
SODIUM SERPL-SCNC: 141 MMOL/L (ref 135–145)
TROPONIN T SERPL-MCNC: 7 NG/L (ref 6–19)
TROPONIN T SERPL-MCNC: <6 NG/L (ref 6–19)
WBC # BLD AUTO: 11.6 K/UL (ref 4.8–10.8)

## 2023-08-14 PROCEDURE — 96374 THER/PROPH/DIAG INJ IV PUSH: CPT

## 2023-08-14 PROCEDURE — 36415 COLL VENOUS BLD VENIPUNCTURE: CPT

## 2023-08-14 PROCEDURE — A9270 NON-COVERED ITEM OR SERVICE: HCPCS | Performed by: EMERGENCY MEDICINE

## 2023-08-14 PROCEDURE — 93005 ELECTROCARDIOGRAM TRACING: CPT | Performed by: EMERGENCY MEDICINE

## 2023-08-14 PROCEDURE — 83605 ASSAY OF LACTIC ACID: CPT

## 2023-08-14 PROCEDURE — 70450 CT HEAD/BRAIN W/O DYE: CPT

## 2023-08-14 PROCEDURE — 90715 TDAP VACCINE 7 YRS/> IM: CPT | Performed by: EMERGENCY MEDICINE

## 2023-08-14 PROCEDURE — 82550 ASSAY OF CK (CPK): CPT

## 2023-08-14 PROCEDURE — 90471 IMMUNIZATION ADMIN: CPT

## 2023-08-14 PROCEDURE — 96372 THER/PROPH/DIAG INJ SC/IM: CPT

## 2023-08-14 PROCEDURE — 700101 HCHG RX REV CODE 250: Performed by: EMERGENCY MEDICINE

## 2023-08-14 PROCEDURE — 304999 HCHG REPAIR-SIMPLE/INTERMED LEVEL 1

## 2023-08-14 PROCEDURE — 85379 FIBRIN DEGRADATION QUANT: CPT

## 2023-08-14 PROCEDURE — 700105 HCHG RX REV CODE 258: Performed by: EMERGENCY MEDICINE

## 2023-08-14 PROCEDURE — 700111 HCHG RX REV CODE 636 W/ 250 OVERRIDE (IP): Performed by: EMERGENCY MEDICINE

## 2023-08-14 PROCEDURE — 85025 COMPLETE CBC W/AUTO DIFF WBC: CPT

## 2023-08-14 PROCEDURE — 303747 HCHG EXTRA SUTURE

## 2023-08-14 PROCEDURE — 84484 ASSAY OF TROPONIN QUANT: CPT

## 2023-08-14 PROCEDURE — 700102 HCHG RX REV CODE 250 W/ 637 OVERRIDE(OP): Performed by: EMERGENCY MEDICINE

## 2023-08-14 PROCEDURE — 304217 HCHG IRRIGATION SYSTEM

## 2023-08-14 PROCEDURE — 80053 COMPREHEN METABOLIC PANEL: CPT

## 2023-08-14 RX ORDER — LORAZEPAM 2 MG/ML
0.5 INJECTION INTRAMUSCULAR ONCE
Status: COMPLETED | OUTPATIENT
Start: 2023-08-14 | End: 2023-08-14

## 2023-08-14 RX ORDER — SODIUM CHLORIDE 9 MG/ML
1000 INJECTION, SOLUTION INTRAVENOUS ONCE
Status: COMPLETED | OUTPATIENT
Start: 2023-08-14 | End: 2023-08-14

## 2023-08-14 RX ORDER — ACETAMINOPHEN 325 MG/1
650 TABLET ORAL ONCE
Status: COMPLETED | OUTPATIENT
Start: 2023-08-14 | End: 2023-08-14

## 2023-08-14 RX ORDER — LIDOCAINE HYDROCHLORIDE AND EPINEPHRINE 10; 10 MG/ML; UG/ML
10 INJECTION, SOLUTION INFILTRATION; PERINEURAL ONCE
Status: COMPLETED | OUTPATIENT
Start: 2023-08-14 | End: 2023-08-14

## 2023-08-14 RX ADMIN — SODIUM CHLORIDE 1000 ML: 9 INJECTION, SOLUTION INTRAVENOUS at 00:21

## 2023-08-14 RX ADMIN — CLOSTRIDIUM TETANI TOXOID ANTIGEN (FORMALDEHYDE INACTIVATED), CORYNEBACTERIUM DIPHTHERIAE TOXOID ANTIGEN (FORMALDEHYDE INACTIVATED), BORDETELLA PERTUSSIS TOXOID ANTIGEN (GLUTARALDEHYDE INACTIVATED), BORDETELLA PERTUSSIS FILAMENTOUS HEMAGGLUTININ ANTIGEN (FORMALDEHYDE INACTIVATED), BORDETELLA PERTUSSIS PERTACTIN ANTIGEN, AND BORDETELLA PERTUSSIS FIMBRIAE 2/3 ANTIGEN 0.5 ML: 5; 2; 2.5; 5; 3; 5 INJECTION, SUSPENSION INTRAMUSCULAR at 00:39

## 2023-08-14 RX ADMIN — LIDOCAINE HYDROCHLORIDE,EPINEPHRINE BITARTRATE 10 ML: 10; .01 INJECTION, SOLUTION INFILTRATION; PERINEURAL at 01:05

## 2023-08-14 RX ADMIN — LORAZEPAM 0.5 MG: 2 INJECTION INTRAMUSCULAR; INTRAVENOUS at 04:13

## 2023-08-14 RX ADMIN — ACETAMINOPHEN 650 MG: 325 TABLET, FILM COATED ORAL at 03:33

## 2023-08-14 ASSESSMENT — PAIN DESCRIPTION - PAIN TYPE: TYPE: ACUTE PAIN

## 2023-08-14 NOTE — ED TRIAGE NOTES
"Chief Complaint   Patient presents with    Fall     Pt had a fall today. + head strike, - thinners, unknown loc. GCS 15 on arrival. Sustained lac to left eyebrow     BIB by [facility] for above complaint. Pt had a fall today, pt was found in the bathroom by daughter. Pt is unable to recall what happened. Unknown loc, - thinners.     Pt has redness to face, eyebrow lac to left eyebrow. Pt reports pain on both legs after incident    GCS 15  PERRLA   mg/dl    Medications given en route:none    /63   Pulse (!) 103   Temp 36.7 °C (98 °F) (Temporal)   Resp 16   Ht 1.803 m (5' 11\")   Wt 109 kg (240 lb)   SpO2 91%   BMI 33.47 kg/m²    "

## 2023-08-14 NOTE — ED NOTES
Patient was unable to get out of bed, complaining of sharp pain in legs. Advised CHRISTIANO Godwin and KUSH.

## 2023-08-14 NOTE — DISCHARGE SUMMARY
"  ED Observation Discharge Summary    Patient:Rafi Zapata Jr.  Patient : 1990  Patient MRN: [id number]  Patient PCP: Pcp Pt States None    Admit Date: 2023  Discharge Date and Time: 23 03:40 AM  Discharge Diagnosis:   1. Fall, initial encounter    2. Closed head injury, initial encounter    3. Facial laceration, initial encounter    4. Seizure-like activity (HCC)        Discharge Attending: Justyn Chacon II, M.D.  Discharge Service: ED Observation    ED Course  Rafi is a 33 y.o. male who was evaluated at [facility] Emergency Department who presented after having seizure like activity.  No history of seizure.  He sustained a laceration to his left eyebrow.  A CT of the head was done. It did not show any intracranial abnormalities or other injuries. Lactic acid elevate which is more suggestive of a seizure.  Wound repaired. At time of sign out troponin and CK pending.       3:41 AM  Doing well. Troponin and CK within acceptable limits. Having significant thigh soreness. Unable to ambulate. Strength at extremities is normal. No sensory loss. Will give ativan 0.5mg for muscle relaxation to see if this helps. If unable to ambulate may need to be hospitalized.     04:45 AM  After ativan he was able to ambulate. Pain much improved. Discharged in good condition. Discussed follow up with Neurology and suture removal.     Discharge Exam:  /66   Pulse 82   Temp 36.5 °C (97.7 °F) (Skin)   Resp 18   Ht 1.803 m (5' 11\")   Wt 109 kg (240 lb)   SpO2 94%   BMI 33.47 kg/m² .    Constitutional: Awake and alert. Nontoxic  HENT:  Laceration to left brow.   Eyes: Grossly normal  Neck: Normal range of motion  Cardiovascular: Normal heart rate   Thorax & Lungs: No respiratory distress  Abdomen: Nontender  Skin:  No pathologic rash.   Extremities: Well perfused  Psychiatric: Affect normal    Labs  Results for orders placed or performed during the hospital encounter of 23   CBC WITH DIFFERENTIAL "   Result Value Ref Range    WBC 11.6 (H) 4.8 - 10.8 K/uL    RBC 5.29 4.70 - 6.10 M/uL    Hemoglobin 17.2 14.0 - 18.0 g/dL    Hematocrit 48.6 42.0 - 52.0 %    MCV 91.9 81.4 - 97.8 fL    MCH 32.5 27.0 - 33.0 pg    MCHC 35.4 32.3 - 36.5 g/dL    RDW 43.6 35.9 - 50.0 fL    Platelet Count 230 164 - 446 K/uL    MPV 10.7 9.0 - 12.9 fL    Neutrophils-Polys 56.50 44.00 - 72.00 %    Lymphocytes 33.00 22.00 - 41.00 %    Monocytes 6.60 0.00 - 13.40 %    Eosinophils 2.70 0.00 - 6.90 %    Basophils 0.90 0.00 - 1.80 %    Immature Granulocytes 0.30 0.00 - 0.90 %    Nucleated RBC 0.00 0.00 - 0.20 /100 WBC    Neutrophils (Absolute) 6.54 1.82 - 7.42 K/uL    Lymphs (Absolute) 3.83 1.00 - 4.80 K/uL    Monos (Absolute) 0.76 0.00 - 0.85 K/uL    Eos (Absolute) 0.31 0.00 - 0.51 K/uL    Baso (Absolute) 0.11 0.00 - 0.12 K/uL    Immature Granulocytes (abs) 0.04 0.00 - 0.11 K/uL    NRBC (Absolute) 0.00 K/uL   COMP METABOLIC PANEL   Result Value Ref Range    Sodium 141 135 - 145 mmol/L    Potassium 3.9 3.6 - 5.5 mmol/L    Chloride 106 96 - 112 mmol/L    Co2 22 20 - 33 mmol/L    Anion Gap 13.0 7.0 - 16.0    Glucose 122 (H) 65 - 99 mg/dL    Bun 12 8 - 22 mg/dL    Creatinine 0.93 0.50 - 1.40 mg/dL    Calcium 9.7 8.5 - 10.5 mg/dL    Correct Calcium 9.4 8.5 - 10.5 mg/dL    AST(SGOT) 23 12 - 45 U/L    ALT(SGPT) 30 2 - 50 U/L    Alkaline Phosphatase 93 30 - 99 U/L    Total Bilirubin 0.3 0.1 - 1.5 mg/dL    Albumin 4.4 3.2 - 4.9 g/dL    Total Protein 7.0 6.0 - 8.2 g/dL    Globulin 2.6 1.9 - 3.5 g/dL    A-G Ratio 1.7 g/dL   TROPONIN   Result Value Ref Range    Troponin T <6 6 - 19 ng/L   D-DIMER   Result Value Ref Range    D-Dimer 0.30 0.00 - 0.50 ug/mL (FEU)   LACTIC ACID   Result Value Ref Range    Lactic Acid 2.4 (H) 0.5 - 2.0 mmol/L   ESTIMATED GFR   Result Value Ref Range    GFR (CKD-EPI) 111 >60 mL/min/1.73 m 2   TROPONIN   Result Value Ref Range    Troponin T 7 6 - 19 ng/L   CREATINE KINASE   Result Value Ref Range    CPK Total 196 (H) 0 - 154 U/L    EKG   Result Value Ref Range    Report       [facility] Emergency Dept.    Test Date:  2023  Pt Name:    MICKI GOZNALEZ       Department: ER  MRN:        [id number]                      Room:        27  Gender:     Male                         Technician: [id number]  :        1990                   Requested By:ER TRIAGE PROTOCOL  Order #:    [id number]                    Reading MD:    Measurements  Intervals                                Axis  Rate:       104                          P:          55  NH:         184                          QRS:        58  QRSD:       74                           T:          56  QT:         319  QTc:        420    Interpretive Statements  Sinus tachycardia  Compared to ECG 2021 02:21:39  Sinus arrhythmia no longer present  ST (T wave) deviation no longer present         Radiology  CT-HEAD W/O   Final Result      No acute intracranial abnormality.      Paranasal sinusitis.                      Medications:   Discharge Medication List as of 2023  4:59 AM          My final assessment includes   1. Fall, initial encounter    2. Closed head injury, initial encounter    3. Facial laceration, initial encounter    4. Seizure-like activity (HCC)        Upon Reevaluation, the patient's condition has: Improved; and will be discharged.    Patient discharged from ED Observation status at 8/15/2023 12:22 AM     Total time spent on this ED Observation discharge encounter is > 30 Minutes    Electronically signed by: Justyn Chacon II, M.D., 2023 1:56 AM

## 2023-08-14 NOTE — ED NOTES
Pt discharged to home. Discharge paperwork provided. Education provided by ERP. Reinforced discharge instructions.  Pt was given follow up instructions and prescriptions.  Pt verbalized understanding of all instructions for discharge.   Patient went out of the ER ambulatory with steady gait., alert and oriented x 4, with all belongings.     Work note provided per pt's request

## 2023-08-14 NOTE — ED PROVIDER NOTES
"ED Provider Note    CHIEF COMPLAINT  Chief Complaint   Patient presents with    Fall     Pt had a fall today. + head strike, - thinners, unknown loc. GCS 15 on arrival. Sustained lac to left eyebrow     EXTERNAL RECORDS REVIEWED  Outpatient Notes from January 2022 the patient was seen and evaluated for exposure to suspected COVID-19 patient.    HPI/ROS  LIMITATION TO HISTORY   Select: : None  OUTSIDE HISTORIAN(S):  Family spouse and mother at bedside    Rafi Zapata Jr. is a 33 y.o. male who presents to the emergency room brought in by EMS personnel for shaking-like activity and a syncopal event.  Patient was at home with his children shower when he apparently woke up on the ground.  He was found by his children and wife who came home after hearing that there was a thought at home and that the patient had some abnormal speaking, some \"foaming at the mouth\" and laceration over his left eye.  Patient reports in the last several days while he has been stressed he has not had any recent traumatic injuries, no chest pain, shortness of breath or recent illness for both himself and or any family members.  He denies any unilateral leg swelling, he has no known history of early onset cardiac disease or sudden cardiac death and has never had a diagnosis of DVT or PE.  He is unaware of any seizure disorder in his family, has not had any pervasive headaches, no recent discoordination or gait instability.    PAST MEDICAL HISTORY   has a past medical history of ASTHMA.    SURGICAL HISTORY  patient denies any surgical history    FAMILY HISTORY  No family history on file.    SOCIAL HISTORY  Social History     Tobacco Use    Smoking status: Every Day     Packs/day: 1.00     Types: Cigarettes    Smokeless tobacco: Never   Vaping Use    Vaping Use: Never used   Substance and Sexual Activity    Alcohol use: No    Drug use: Not Currently     Types: Inhaled     Comment: marijuana    Sexual activity: Not on file       CURRENT " "MEDICATIONS  Home Medications    **Home medications have not yet been reviewed for this encounter**         ALLERGIES  No Known Allergies    PHYSICAL EXAM  VITAL SIGNS: /67   Pulse (!) 104   Temp 36.7 °C (98 °F) (Temporal)   Resp 18   Ht 1.803 m (5' 11\")   Wt 109 kg (240 lb)   SpO2 92%   BMI 33.47 kg/m²    Genl: M sitting in gurney comfortably, speaking clearly, appears anxious and in mild distress   Head: NC, 1.5 cm partial-thickness left lateral eyebrow laceration  ENT: Mucous membranes moist, no tongue lacerations, posterior pharynx clear, uvula midline, nares patent bilaterally   Eyes: Normal sclera, pupils equal round reactive to light  Neck: Supple, FROM, no LAD appreciated   Pulmonary: Lungs are clear to auscultation bilaterally  Chest: No TTP  CV: Tachycardic, no murmur appreciated, pulses 2+ in both upper and lower extremities,  Abdomen: soft, NT/ND; no rebound/guarding, no masses palpated, no HSM   : no CVA or suprapubic tenderness   Musculoskeletal: Pain free ROM of the neck. Moving upper and lower extremities in spontaneous and coordinated fashion  Neuro: Mental Status: Speech fluent without errors. Follows all commands. No dysarthria or apraxia.  Cranial Nerves: Pupils equal round and reactive to light. Extraocular motion intact. Visual fields intact. No nystagmus. CN V1-V3 intact to light touch. No facial asymmetry. Hearing clinically intact bilaterally. Tongue protrusion midline. No uvular deviation. Normal shoulder shrug and head turn.  Motor:  RUE: 5/5 with hand , 5/5 with flexion at the elbow 5/5 with extension at the elbow  LUE: 5/5 with hand , 5/5 with flexion at the elbow 5/5 with extension at the elbow  RLE: 5/5 with leg raise, 5/5 with plantar flexion, 5/5 with dorsal flexion  LLE: 5/5 with leg raise, 5/5 with plantar flexion, 5/5 with dorsal flexion  Sensation to light touch intact throughout  Reflexes 2+ Patellar tendons  No ataxia noted  Rapidly alternating movements " without difficulty  Skin: Forehead laceration as above.  No pallor or jaundice.  No cyanosis.  Warm and dry.     DIAGNOSTIC STUDIES / PROCEDURES  EKG  I have independently interpreted this EKG  Results for orders placed or performed during the hospital encounter of 21   EKG   Result Value Ref Range    Report       [facility] Emergency Dept.    Test Date:  2021  Pt Name:    MICKI GONZALEZ       Department: [facility]  MRN:        [id number]                      Room:       [facility]  Gender:     Male                         Technician: AMALIA  :        1990                   Requested By:WHITNEY RODRIGUEZ  Order #:    [id number]                    Reading MD:    Measurements  Intervals                                Axis  Rate:       66                           P:          67  AK:         171                          QRS:        78  QRSD:       98                           T:          67  QT:         372  QTc:        390    Interpretive Statements  Sinus arrhythmia  Low voltage, precordial leads  RSR' in V1 or V2, probably normal variant  ST elev, probable normal early repol pattern  No previous ECG available for comparison       LABS  Labs Reviewed   CBC WITH DIFFERENTIAL - Abnormal; Notable for the following components:       Result Value    WBC 11.6 (*)     All other components within normal limits    Narrative:     Biotin intake of greater than 5 mg per day may interfere with  troponin levels, causing false low values.   COMP METABOLIC PANEL - Abnormal; Notable for the following components:    Glucose 122 (*)     All other components within normal limits    Narrative:     Biotin intake of greater than 5 mg per day may interfere with  troponin levels, causing false low values.   LACTIC ACID - Abnormal; Notable for the following components:    Lactic Acid 2.4 (*)     All other components within normal limits   TROPONIN    Narrative:     Biotin intake of greater than 5 mg  per day may interfere with  troponin levels, causing false low values.   D-DIMER    Narrative:     Biotin intake of greater than 5 mg per day may interfere with  troponin levels, causing false low values.   ESTIMATED GFR    Narrative:     Biotin intake of greater than 5 mg per day may interfere with  troponin levels, causing false low values.   TROPONIN   CREATINE KINASE      RADIOLOGY  I have independently interpreted the diagnostic imaging associated with this visit and am waiting the final reading from the radiologist.   My preliminary interpretation is as follows: No focal bleed, skull fracture or mass  Radiologist interpretation:   CT-HEAD W/O   Final Result      No acute intracranial abnormality.      Paranasal sinusitis.                    COURSE & MEDICAL DECISION MAKING    ED Observation Status? Yes; I am placing the patient in to an observation status due to a diagnostic uncertainty as well as therapeutic intensity. Patient placed in observation status at 2:02 AM, 8/14/2023.     Observation plan is as follows: Delta troponin and CK lab testing.  This will take him beyond my stay here in the emergency department and patient once lab results and tertiary reassessment will be performed by Dr. Chacon    Anticipate that after results more likely patient will be able to be discharged home with outpatient neurology follow-up    INITIAL ASSESSMENT, COURSE AND PLAN  Heart arrhythmia  ACS  Neurocardiogenic  Seizure  GI bleeding  Vasovagal syncope/Orthostasis  Dehydration  Polypharmacy  Anemia  Medication side effect    Care Narrative: Patient presents the emergency room with loss of consciousness and event at home and that transpired and included a fall from standing height with a facial lacerations.  He has no recent history of illness, no recent prodromal symptoms such as dizziness, lightheaded or any exertional chest discomfort.  At the time of my evaluation he is alert and oriented but is described as his  family members as having a postictal period.  He has slight tachycardia, no elements of active chest discomfort, shortness of breath or history of PE.  Initial troponin is not elevated, dimer is not elevated, he has no gross electrolyte abnormalities or LFT elevations or JAYANT and he has a very scant leukocytosis of 11.6 with no leftward shift or bandemia.  There is no anemia and lactate is elevated, again pointing towards possibility of seizure-like activity.  Laceration and tetanus update are performed and noted below.  At this time a head CT was obtained and showed no evidence of intracranial abnormality or mass.      Patient was having ongoing myalgia, repeat Troponin and CK were then drawn.    At the time of signout, patient is pending reevaluation from my colleague for delta troponin results and results of CK.  He is already received fluids, he has some generalized myalgias likely secondary to his seizure-like activity.  Signout plan will include being admitted if his troponin is elevated for echo and cardiac enzymes trending possible stress testing or Holter monitoring.  Labs are unremarkable patient can be discharged home with outpatient neurology follow-up for first-time seizure.  Referral is already been placed.    Procedure - Laceration closure  Patient was positioned appropriately, 3cc lidocaine with epinephrine was used as a local anesthetic. 200cc NaCl was used for irrigation. Patient was sterile draped with wound exposed. 5x  5.0 nylon sutures were placed with good approximation. Procedure tolerated without complications. Wound dressed with bacitracin and sterile gauze.     HYDRATION: Based on the patient's presentation of Dehydration and Tachycardia the patient was given IV fluids. IV Hydration was used because oral hydration was not adequate alone. Upon recheck following hydration, the patient was improving.    Patient is also been advised about the importance of safety and first-time seizure.   Patient showed avoid being alone while swimming or bathing and should abstain from driving until follow-up with neurology    FINAL DIAGNOSIS  1. Fall, initial encounter    2. Closed head injury, initial encounter    3. Facial laceration, initial encounter    4. Seizure-like activity (HCC)         Electronically signed by: Alfredito Valdivia M.D., 8/14/2023 12:16 AM

## 2023-08-14 NOTE — ED NOTES
Road test complete. Patient was able to ambulate with steady gait and reported no pain or complaints. Advised R.N.

## 2023-08-14 NOTE — ED NOTES
Rounded on pt, pt states his body is sore. RN asked if he wants pain medication, he states he would like a muscle relaxant. ERP notified

## 2023-08-24 ENCOUNTER — HOSPITAL ENCOUNTER (EMERGENCY)
Facility: MEDICAL CENTER | Age: 33
End: 2023-08-24
Attending: EMERGENCY MEDICINE
Payer: COMMERCIAL

## 2023-08-24 VITALS
HEART RATE: 90 BPM | RESPIRATION RATE: 16 BRPM | OXYGEN SATURATION: 92 % | WEIGHT: 240 LBS | DIASTOLIC BLOOD PRESSURE: 73 MMHG | SYSTOLIC BLOOD PRESSURE: 108 MMHG | TEMPERATURE: 97.7 F | BODY MASS INDEX: 33.6 KG/M2 | HEIGHT: 71 IN

## 2023-08-24 DIAGNOSIS — R56.9 SEIZURE (HCC): ICD-10-CM

## 2023-08-24 LAB
ALBUMIN SERPL BCP-MCNC: 4.6 G/DL (ref 3.2–4.9)
ALBUMIN/GLOB SERPL: 1.6 G/DL
ALP SERPL-CCNC: 88 U/L (ref 30–99)
ALT SERPL-CCNC: 33 U/L (ref 2–50)
ANION GAP SERPL CALC-SCNC: 17 MMOL/L (ref 7–16)
AST SERPL-CCNC: 21 U/L (ref 12–45)
BILIRUB SERPL-MCNC: 0.3 MG/DL (ref 0.1–1.5)
BUN SERPL-MCNC: 14 MG/DL (ref 8–22)
CALCIUM ALBUM COR SERPL-MCNC: 9.3 MG/DL (ref 8.5–10.5)
CALCIUM SERPL-MCNC: 9.8 MG/DL (ref 8.5–10.5)
CHLORIDE SERPL-SCNC: 105 MMOL/L (ref 96–112)
CO2 SERPL-SCNC: 17 MMOL/L (ref 20–33)
CREAT SERPL-MCNC: 1.01 MG/DL (ref 0.5–1.4)
GFR SERPLBLD CREATININE-BSD FMLA CKD-EPI: 100 ML/MIN/1.73 M 2
GLOBULIN SER CALC-MCNC: 2.8 G/DL (ref 1.9–3.5)
GLUCOSE SERPL-MCNC: 132 MG/DL (ref 65–99)
POTASSIUM SERPL-SCNC: 4.3 MMOL/L (ref 3.6–5.5)
PROT SERPL-MCNC: 7.4 G/DL (ref 6–8.2)
SODIUM SERPL-SCNC: 139 MMOL/L (ref 135–145)

## 2023-08-24 PROCEDURE — 80053 COMPREHEN METABOLIC PANEL: CPT

## 2023-08-24 PROCEDURE — 99285 EMERGENCY DEPT VISIT HI MDM: CPT

## 2023-08-24 PROCEDURE — 700111 HCHG RX REV CODE 636 W/ 250 OVERRIDE (IP): Mod: JZ | Performed by: EMERGENCY MEDICINE

## 2023-08-24 PROCEDURE — 700105 HCHG RX REV CODE 258: Performed by: EMERGENCY MEDICINE

## 2023-08-24 PROCEDURE — 36415 COLL VENOUS BLD VENIPUNCTURE: CPT

## 2023-08-24 PROCEDURE — 96374 THER/PROPH/DIAG INJ IV PUSH: CPT

## 2023-08-24 RX ORDER — SODIUM CHLORIDE, SODIUM LACTATE, POTASSIUM CHLORIDE, CALCIUM CHLORIDE 600; 310; 30; 20 MG/100ML; MG/100ML; MG/100ML; MG/100ML
1000 INJECTION, SOLUTION INTRAVENOUS ONCE
Status: DISCONTINUED | OUTPATIENT
Start: 2023-08-24 | End: 2023-08-24

## 2023-08-24 RX ORDER — SODIUM CHLORIDE 9 MG/ML
1000 INJECTION, SOLUTION INTRAVENOUS ONCE
Status: COMPLETED | OUTPATIENT
Start: 2023-08-24 | End: 2023-08-24

## 2023-08-24 RX ORDER — LEVETIRACETAM 500 MG/5ML
1000 INJECTION, SOLUTION, CONCENTRATE INTRAVENOUS ONCE
Status: COMPLETED | OUTPATIENT
Start: 2023-08-24 | End: 2023-08-24

## 2023-08-24 RX ORDER — LEVETIRACETAM 500 MG/1
500 TABLET ORAL 2 TIMES DAILY
Qty: 60 TABLET | Refills: 0 | Status: SHIPPED | OUTPATIENT
Start: 2023-08-24 | End: 2023-08-28 | Stop reason: SDUPTHER

## 2023-08-24 RX ADMIN — SODIUM CHLORIDE 1000 ML: 9 INJECTION, SOLUTION INTRAVENOUS at 20:59

## 2023-08-24 RX ADMIN — LEVETIRACETAM 1000 MG: 100 INJECTION, SOLUTION, CONCENTRATE INTRAVENOUS at 20:58

## 2023-08-24 ASSESSMENT — FIBROSIS 4 INDEX: FIB4 SCORE: 0.6

## 2023-08-25 NOTE — ED NOTES
Pt provided discharge instructions including where to pickup prescriptions. Pt verbalizes understanding and has no questions at this time. Pt ambulates from ED with steady gait, in good condition, and in possession of all belongings, including discharge paperwork.

## 2023-08-25 NOTE — ED NOTES
89% on room air. Placed on 2 LPM via nasal cannula with improvement. Rails padded as a precaution.

## 2023-08-25 NOTE — ED PROVIDER NOTES
"ED Provider Note    CHIEF COMPLAINT  Chief Complaint   Patient presents with    Seizure       EXTERNAL RECORDS REVIEWED  Previous note    HPI/ROS  LIMITATION TO HISTORY   None  OUTSIDE HISTORIAN(S):  None    Rafi Zapata Jr. is a 33 y.o. male who presents here for evaluation of seizure.  Patient states he had a seizure last week, and was seen evaluated here.  He had a negative CT scan, and was discharged home for follow-up.  He scheduled outpatient neurology follow-up for Monday.  He had another seizure today, that was witnessed.  Patient was sitting at the time, he did not fall to the ground, did not strike his head.  Patient has no chest pain shortness of breath, or abdominal pain.  Patient has no neck or back pain.  He has no oral trauma, and had no incontinence.    PAST MEDICAL HISTORY   has a past medical history of ASTHMA.    SURGICAL HISTORY  patient denies any surgical history    FAMILY HISTORY  History reviewed. No pertinent family history.    SOCIAL HISTORY  Social History     Tobacco Use    Smoking status: Every Day     Current packs/day: 1.00     Average packs/day: 1 pack/day for 15.0 years (15.0 ttl pk-yrs)     Types: Cigarettes    Smokeless tobacco: Never   Vaping Use    Vaping Use: Never used   Substance and Sexual Activity    Alcohol use: No    Drug use: Not Currently     Types: Inhaled     Comment: marijuana    Sexual activity: Not on file       CURRENT MEDICATIONS  Home Medications       Reviewed by Michael Almanza R.N. (Registered Nurse) on 08/24/23 at 1942  Med List Status: Not Addressed     Medication Last Dose Status   Albuterol Sulfate 108 (90 Base) MCG/ACT AEROSOL POWDER, BREATH ACTIVATED  Active   carbamide peroxide (DEBROX) 6.5 % Solution  Active                    ALLERGIES  No Known Allergies    PHYSICAL EXAM  VITAL SIGNS: /83   Pulse 98   Temp 37.3 °C (99.1 °F) (Temporal)   Resp (!) 25   Ht 1.803 m (5' 11\")   Wt 109 kg (240 lb)   SpO2 95%   BMI 33.47 kg/m²  "   Constitutional: Well developed, well nourished. No acute distress.  HEENT: Normocephalic, atraumatic. Posterior pharynx clear and moist.  No oral trauma  Eyes:  EOMI. Normal sclera.  Neck: Supple, Full range of motion, nontender.  Chest/Pulmonary: clear to ausculation. Symmetrical expansion.   Cardio: Regular rate and rhythm with no murmur.   Abdomen: Soft, nontender. No peritoneal signs. No guarding. No palpable masses.  Musculoskeletal: No deformity, no edema, neurovascular intact.   Neuro: Clear speech, appropriate, cooperative, cranial nerves II-XII grossly intact.  Negative Romberg, good finger-to-nose, dorsi plantarflexion extension 5 out of 5 bilateral lower extremities, equal .  Psych: Anxious mood and affect      DIAGNOSTIC STUDIES / PROCEDURES  Results for orders placed or performed during the hospital encounter of 08/24/23   COMP METABOLIC PANEL   Result Value Ref Range    Sodium 139 135 - 145 mmol/L    Potassium 4.3 3.6 - 5.5 mmol/L    Chloride 105 96 - 112 mmol/L    Co2 17 (L) 20 - 33 mmol/L    Anion Gap 17.0 (H) 7.0 - 16.0    Glucose 132 (H) 65 - 99 mg/dL    Bun 14 8 - 22 mg/dL    Creatinine 1.01 0.50 - 1.40 mg/dL    Calcium 9.8 8.5 - 10.5 mg/dL    Correct Calcium 9.3 8.5 - 10.5 mg/dL    AST(SGOT) 21 12 - 45 U/L    ALT(SGPT) 33 2 - 50 U/L    Alkaline Phosphatase 88 30 - 99 U/L    Total Bilirubin 0.3 0.1 - 1.5 mg/dL    Albumin 4.6 3.2 - 4.9 g/dL    Total Protein 7.4 6.0 - 8.2 g/dL    Globulin 2.8 1.9 - 3.5 g/dL    A-G Ratio 1.6 g/dL   ESTIMATED GFR   Result Value Ref Range    GFR (CKD-EPI) 100 >60 mL/min/1.73 m 2         RADIOLOGY  none    COURSE & MEDICAL DECISION MAKING    Patient is a 33-year-old male here for evaluation of seizure.  This was a second seizure over the last week, and was witnessed.  He did not strike his head there is no need for CT of the brain.  Initially did some labs on him, and noted for him to have a slightly low CO2, and he was given IV fluids.  He has no focal  neurodeficits, no history of diabetes, and is 95% on room air.  I spoke to neurology, Dr. Shane,who stated that we could start him on Keppra, and have him keep his appointment at the neurology at [facility].  I spoke to pharmacy, and they recommended loading with a gram of Keppra, and 500 twice daily starting tomorrow.    INITIAL ASSESSMENT, COURSE AND PLAN  Care Narrative: See above    DISPOSITION AND DISCUSSIONS  I have discussed management of the patient with the following physicians and ISERAL's: Dr. Edwards,     Discussion of management with other Q or appropriate source(s): None    Escalation of care considered, and ultimately not performed: None    Barriers to care at this time, including but not limited to: Patient does not have established PCP.     Decision tools and prescription drugs considered including, but not limited to:  None .    FINAL DIAGNOSIS  1. Seizure (HCC)           Electronically signed by: Mina Maxwell D.O., 8/24/2023 9:50 PM

## 2023-08-28 ENCOUNTER — OFFICE VISIT (OUTPATIENT)
Dept: NEUROLOGY | Facility: MEDICAL CENTER | Age: 33
End: 2023-08-28
Attending: PSYCHIATRY & NEUROLOGY
Payer: COMMERCIAL

## 2023-08-28 VITALS
SYSTOLIC BLOOD PRESSURE: 106 MMHG | OXYGEN SATURATION: 94 % | BODY MASS INDEX: 34.17 KG/M2 | DIASTOLIC BLOOD PRESSURE: 74 MMHG | WEIGHT: 244.05 LBS | HEIGHT: 71 IN | HEART RATE: 86 BPM

## 2023-08-28 DIAGNOSIS — R56.9 SEIZURE (HCC): ICD-10-CM

## 2023-08-28 PROCEDURE — 3078F DIAST BP <80 MM HG: CPT | Performed by: PSYCHIATRY & NEUROLOGY

## 2023-08-28 PROCEDURE — 3074F SYST BP LT 130 MM HG: CPT | Performed by: PSYCHIATRY & NEUROLOGY

## 2023-08-28 PROCEDURE — 99205 OFFICE O/P NEW HI 60 MIN: CPT | Performed by: PSYCHIATRY & NEUROLOGY

## 2023-08-28 PROCEDURE — 99211 OFF/OP EST MAY X REQ PHY/QHP: CPT | Performed by: PSYCHIATRY & NEUROLOGY

## 2023-08-28 RX ORDER — LEVETIRACETAM 500 MG/1
500 TABLET ORAL 2 TIMES DAILY
Qty: 60 TABLET | Refills: 5 | Status: SHIPPED | OUTPATIENT
Start: 2023-08-28 | End: 2023-09-14 | Stop reason: SDUPTHER

## 2023-08-28 ASSESSMENT — PATIENT HEALTH QUESTIONNAIRE - PHQ9
5. POOR APPETITE OR OVEREATING: 1 - SEVERAL DAYS
SUM OF ALL RESPONSES TO PHQ QUESTIONS 1-9: 5
CLINICAL INTERPRETATION OF PHQ2 SCORE: 2

## 2023-08-28 ASSESSMENT — FIBROSIS 4 INDEX: FIB4 SCORE: 0.52

## 2023-08-28 NOTE — PROGRESS NOTES
"[facility] Epilepsy Program  New Patient Visit      Patient's Name: Rafi Zapata Jr.  YOB: 1990  MRN: [id number]  Date of Service: 08/28/23    Referring Provider: Alfredito Valdivia M.D.  [address] [place] [address] [place] [id number]    Chief Concern: Seizures.    The patient presents with his wife and provides verbal consent for her to be present and provide additional history as necessary.      HPI: The patient is a 33 y.o., right-handed male, who initially presented to my epilepsy clinic for evaluation of seizures on 08/28/2023.    The patient had his first known seizure on 08/13/2023, as noted under event type #1. He  his second similar event on 08/24/2023, and was started on Keppra after the second event.    He never had isolated staring spells. No history of oral/manual automatisms. No history of strange smells/tastes (except as noted below), nor gastric uprising. No autonomic phenomena. No myoclonus. No nocturnal seizures as of the time of the initial visit with me.     He has metallic/blood taste after eating chips.     He is on Keppra and has no adverse effects from it, specifically, no mood issues; there was fatigue at the start of Keppra, but this got better.     He has asthma is overall well controlled at this time.    He used to smoke marijuana heavily and stopped on 08/13/2023.     The patient used to smoke high potency marijuana, energy drinks, and was on Abx, and in that context, had an episode of bilateral body numbness that lasted for an hour and did not come back after he stopped drinking energy drinks.     Semiology:  Event type #1: \"Seizures\".  Year/Age of Onset: August 2023.  Initial features: Lightheadedness that started on top of his head and his head felt cold. He at times experiences karlee vu for several hours prior to the event. .   Event features: Stared off, loses awareness, and turned his head to the left. This is followed by a bilateral tonic-clonic " "movements. He has no recollection for the time during the event and is he responsive during the event. No tongue bite. No bladder/bowel incontinence.  Post-ictal features: Feels very confused and sore after the event.   Duration: Around 1 minute.   Frequency: The first event was on 08/13/2023. The next and the last event was on 08/24/2023.  Precipitating factors: Stress. Heat.     History of status epilepticus: no  History of physical injury related to seizures: yes  History of surgery related to epilepsy: no  Family Planning: N/A  Current Driving Status: Not driving at this time.   Seizure Clusters: No   Longest Seizure Freedom: Not clear.     Pertinent Ancillary Test Results:    MRI brain studies:   - MRI brain - none available for my reivew.     CT head studies:   - CT head wo contrast (08/14/2023 at [facility]): \"No acute intracranial abnormality. Paranasal sinusitis.\" I reviewed the images on 08/28/2023.     EEG studies:   - Standard EEG - none available for my review.     Current Antiseizure Medications: Keppra 500 mg BID.     Previously Tried Antiseizure Medications: None.     Review of Systems: No recent fevers. No recent significant weight changes. The mood is overall stable. No SI/HI    Risk Factors For Epilepsy/Seizure Disorder: The patient is a product of normal pregnancy and uncomplicated delivery. The early development was normal and the patient started waking, talking, and engaging in social interaction as expected. There were no challenges during school and no reported attendance of special education programs. There is no history of febrile seizures. There is no history of head trauma. There is no history of stroke. There is no history of CNS infections, such as encephalitis and/or meningitis. There is no history of neurosurgical interventions. There is no reported history of staring spells during childhood/school years.    Past Medical History:  Past Medical History:   Diagnosis Date    ASTHMA     " "Seizure disorder (HCC)      Past Surgical History:  Ear surgery as child. Right leg fracture (not sure if there was a surgical repair).     Social History:  Social History     Tobacco Use    Smoking status: Every Day     Current packs/day: 1.00     Average packs/day: 1 pack/day for 15.0 years (15.0 ttl pk-yrs)     Types: Cigarettes    Smokeless tobacco: Never   Vaping Use    Vaping Use: Never used   Substance Use Topics    Alcohol use: No    Drug use: Not Currently     Types: Inhaled     Comment: marijuana     Family History:  There is no known family history of seizures/epilepsy.    [place] Suicide Severity Rating Scale     Wish to be Dead?: No  Suicidal Thoughts: No    Suicidal Thoughts with Method Without Specific Plan or Intent to Act:    Suicidal Intent Without Specific Plan:    Suicide Intent with Specific Plan:    Suicide Behavior Question: No  How long ago did you do any of these?:    C-SSRS Risk Level: No Risk    Allergies:  No Known Allergies    Current Medications:    Current Outpatient Medications:     EPINEPHrine (PRIMATENE MIST INH), Inhale., Taking    levETIRAcetam, 500 mg, Oral, BID, Taking    Albuterol Sulfate, Inhale  by mouth., PRN    carbamide peroxide, 6 Drop, Otic, BID    Physical Examination:    Ambulatory Vitals  Encounter Vitals  Blood Pressure: 106/74  Pulse: 86  Pulse Oximetry: 94 %  Weight: 111 kg (244 lb 0.8 oz)  Height: 180.3 cm (5' 11\")  BMI (Calculated): 34.04    Neurological Examination:  Mental Status: The patient is alert and oriented to person, place, time, and situation. Speech is fluent, with no aphasia nor dysarthria noted. Affect is normal.    Cranial Nerve Examination:  CN I: Olfaction examination is deferred.  CN II: Visual fields are full to confrontation examination and show no visual field defect.   CN III, IV, VI: Eye movements are normal in all directions. Pupils are reactive to direct and consensual light. There is no relative afferent pupillary defect. There is no " nystagmus.  CN V: Facial sensation to light touch is intact throughout.   CN VII: No significant facial muscle or other soft tissue asymmetry.  CN VIII: Hearing intact to rubbing sounds bilaterally.   CN IX, X: Soft palate elevates symmetrically.  CN XI: Symmetrical shoulder shrug exam.  CN XII: Midline tongue protrusion and moves symmetrically to each side.     Motor Examination:  Muscle strength is intact (5/5) throughout. Muscle tone is normal throughout. No abnormal movements are observed. No pronator drift is noted.     Muscle Stretch Reflexes Examination:  Muscle stretch reflexes are normal (2+) throughout and symmetric.    Sensory Examination:  Preserved sensation to light touch in all extremities.     Coordination:  Normal finger to nose testing bilaterally, no postural nor intentional tremor was noted.     Stance/gait:  Normal regular gait with normal arm swings and stride length. Able to perform tandem gait. Romberg sign is absent.       ASSESSMENT AND PLAN:  1. 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     At this time, his seizures are well controlled with Keppra. Discussed adverse effects. We will increase the dose in the future if necessary. Refills provided.  - levETIRAcetam (KEPPRA) 500 MG Tab; Take 1 Tablet by mouth 2 times a day.  Dispense: 60 Tablet; Refill: 5    - Vitamin D3 5000 Unit (125 mcg) Tab; Take 1 Tablet by mouth every day.  Dispense: 90 Tablet; Refill: 3    We will obtain Keppra level:   - LEVETIRACETAM (KEPPRA), S    We will obtain work up for seizure disorder:  - MR-BRAIN-W/O; Future    - 24-hour ambulatory EEG: Referral to Neurodiagnostics (EEG,EP,EMG/NCS/DBS)    Provided [place] paperwork today.    Filled out DMV paperwork that he should not drive.     Epilepsy counseling provided in writing and verbally.    Patient Instructions   Please continue Keppra 500 mg every 12 hours.     Please let our office know if you have any changes in your seizure frequency  and/characteristics. Otherwise, please keep the diary of your events and bring it with you at the time of your next follow up visit with our office.     Please take vitamin D3 5000 internation units daily.     Please note that the following might precipitate seizures: missed doses of antiseizure medications, being sick with fever, stress, fatigue, sleep deprivation, not eating regularly, not drinking enough water, drinking too much alcohol, stopping alcohol suddenly if you are currently using it on a regular/daily basis, and/or using recreational drugs, among others.    Please note that the following might lead to an injury, potentially a life-threatening injury, in case you have a seizure and/or lose awareness while:   - being in a large body of water by yourself, such as bath, pool, lake, ocean, among others (risk of drowning)   - being on unprotected heights (risk of fall)   - being around and/or operating heavy machinery (risk of injury)   - being around open fire/hot surfaces (risk of burns)   - any other activities/circumstances, in which if you lose awareness, you might injure yourself and/or others.    Please call for help (crisis line and/or 911) in case you have thoughts of harming yourself and/or others.    Please abstain from driving until further notice.    ------------------------------------------------------------------------------------------  Instructions for your family/caregivers:  Please call 911 if the patient has a seizure longer than 2-3 minutes, if seizures are back to back without him recovering to his baseline, or he does not start recovering within 5-10 minutes after the seizure stops. During the seizure - please turn him on his side, please make sure his head is protected (for example, you should put a pillow under his head, if one is available), and please do not put anything in his mouth.    -------------------------------------------------------------------------------------------    It is important that your seizures are well controlled and you have none or have them rarely. In addition to avoiding injury related to breakthrough seizures, frequent seizures increase risk of SUDEP (sudden unexpected death in epilepsy), where a person goes into a seizure and then never wakes up - this is a rare complication of seizure disorder; one of the best available ways to prevent it is to control your seizures well.     Due to the high volume of patients we are trying to help, your physician will not be able to respond by phone or in Dailyeventhart to your routine concerns between appointments.  This does not reflect a lack of interest or concern for you or your diagnosis.  Please bring these questions and concerns to your appointment where your physician can answer.  Please relay more pressing concerns to our office, either via Dailyeventhart, or by phone; if not able to reach us please visit nearby Urgent Care Center or Emergency Department.  If any emergent medical needs, please seek emergent medical help and/or call 911.    Please note that we are not able to fill out paperwork that might be related to your work, utility company, disability, and/or driving, among others, in between the visits.  Please schedule a dedicated appointment to address your paperwork, so we can do that in a timely manner.  This is not due to lack of concern or interest for your disease-related work/administrative problems, but to make sure that we provide the best possible care and to fill out your paperwork in a correct and timely manner.    Thank you for entrusting your neurological care to [facility] and we look forward to continuing to serve you.     Follow up in 3 months.     My total time spent caring for the patient on the day of the encounter was 61 minutes.   This does not include time spent on separately billable  procedures/tests.      Catracho Oshea MD  Outpatient Neurology   [facility]

## 2023-08-28 NOTE — PATIENT INSTRUCTIONS
Please continue Keppra 500 mg every 12 hours.     Please let our office know if you have any changes in your seizure frequency and/characteristics. Otherwise, please keep the diary of your events and bring it with you at the time of your next follow up visit with our office.     Please take vitamin D3 5000 internation units daily.     Please note that the following might precipitate seizures: missed doses of antiseizure medications, being sick with fever, stress, fatigue, sleep deprivation, not eating regularly, not drinking enough water, drinking too much alcohol, stopping alcohol suddenly if you are currently using it on a regular/daily basis, and/or using recreational drugs, among others.    Please note that the following might lead to an injury, potentially a life-threatening injury, in case you have a seizure and/or lose awareness while:   - being in a large body of water by yourself, such as bath, pool, lake, ocean, among others (risk of drowning)   - being on unprotected heights (risk of fall)   - being around and/or operating heavy machinery (risk of injury)   - being around open fire/hot surfaces (risk of burns)   - any other activities/circumstances, in which if you lose awareness, you might injure yourself and/or others.    Please call for help (crisis line and/or 911) in case you have thoughts of harming yourself and/or others.    Please abstain from driving until further notice.    ------------------------------------------------------------------------------------------  Instructions for your family/caregivers:  Please call 911 if the patient has a seizure longer than 2-3 minutes, if seizures are back to back without him recovering to his baseline, or he does not start recovering within 5-10 minutes after the seizure stops. During the seizure - please turn him on his side, please make sure his head is protected (for example, you should put a pillow under his head, if one is available), and please do  not put anything in his mouth.   -------------------------------------------------------------------------------------------    It is important that your seizures are well controlled and you have none or have them rarely. In addition to avoiding injury related to breakthrough seizures, frequent seizures increase risk of SUDEP (sudden unexpected death in epilepsy), where a person goes into a seizure and then never wakes up - this is a rare complication of seizure disorder; one of the best available ways to prevent it is to control your seizures well.     Due to the high volume of patients we are trying to help, your physician will not be able to respond by phone or in Logi-Servehart to your routine concerns between appointments.  This does not reflect a lack of interest or concern for you or your diagnosis.  Please bring these questions and concerns to your appointment where your physician can answer.  Please relay more pressing concerns to our office, either via Logi-Servehart, or by phone; if not able to reach us please visit nearby Urgent Care Center or Emergency Department.  If any emergent medical needs, please seek emergent medical help and/or call 911.    Please note that we are not able to fill out paperwork that might be related to your work, utility company, disability, and/or driving, among others, in between the visits.  Please schedule a dedicated appointment to address your paperwork, so we can do that in a timely manner.  This is not due to lack of concern or interest for your disease-related work/administrative problems, but to make sure that we provide the best possible care and to fill out your paperwork in a correct and timely manner.    Thank you for entrusting your neurological care to [facility] and we look forward to continuing to serve you.

## 2023-09-10 ENCOUNTER — HOSPITAL ENCOUNTER (OUTPATIENT)
Dept: RADIOLOGY | Facility: MEDICAL CENTER | Age: 33
End: 2023-09-10
Attending: PSYCHIATRY & NEUROLOGY
Payer: COMMERCIAL

## 2023-09-10 DIAGNOSIS — R56.9 SEIZURE (HCC): ICD-10-CM

## 2023-09-10 PROCEDURE — 700117 HCHG RX CONTRAST REV CODE 255: Performed by: PSYCHIATRY & NEUROLOGY

## 2023-09-10 PROCEDURE — A9579 GAD-BASE MR CONTRAST NOS,1ML: HCPCS | Performed by: PSYCHIATRY & NEUROLOGY

## 2023-09-10 PROCEDURE — 70553 MRI BRAIN STEM W/O & W/DYE: CPT

## 2023-09-10 RX ADMIN — GADOTERIDOL 20 ML: 279.3 INJECTION, SOLUTION INTRAVENOUS at 07:47

## 2023-09-14 ENCOUNTER — HOSPITAL ENCOUNTER (EMERGENCY)
Facility: MEDICAL CENTER | Age: 33
End: 2023-09-14
Attending: EMERGENCY MEDICINE
Payer: COMMERCIAL

## 2023-09-14 VITALS
SYSTOLIC BLOOD PRESSURE: 125 MMHG | RESPIRATION RATE: 19 BRPM | BODY MASS INDEX: 33.6 KG/M2 | HEART RATE: 93 BPM | TEMPERATURE: 97.5 F | OXYGEN SATURATION: 91 % | WEIGHT: 240 LBS | HEIGHT: 71 IN | DIASTOLIC BLOOD PRESSURE: 71 MMHG

## 2023-09-14 DIAGNOSIS — R56.9 SEIZURE (HCC): ICD-10-CM

## 2023-09-14 DIAGNOSIS — G40.909 SEIZURE DISORDER (HCC): ICD-10-CM

## 2023-09-14 PROCEDURE — A9270 NON-COVERED ITEM OR SERVICE: HCPCS | Performed by: EMERGENCY MEDICINE

## 2023-09-14 PROCEDURE — 99284 EMERGENCY DEPT VISIT MOD MDM: CPT

## 2023-09-14 PROCEDURE — 700102 HCHG RX REV CODE 250 W/ 637 OVERRIDE(OP): Performed by: EMERGENCY MEDICINE

## 2023-09-14 RX ORDER — LEVETIRACETAM 1000 MG/1
1000 TABLET ORAL 2 TIMES DAILY
Qty: 180 TABLET | Refills: 0 | Status: SHIPPED | OUTPATIENT
Start: 2023-09-14 | End: 2023-09-26 | Stop reason: SDUPTHER

## 2023-09-14 RX ORDER — LEVETIRACETAM 500 MG/1
500 TABLET ORAL ONCE
Status: COMPLETED | OUTPATIENT
Start: 2023-09-14 | End: 2023-09-14

## 2023-09-14 RX ADMIN — LEVETIRACETAM 500 MG: 500 TABLET, FILM COATED ORAL at 07:22

## 2023-09-14 ASSESSMENT — FIBROSIS 4 INDEX: FIB4 SCORE: 0.52

## 2023-09-14 NOTE — ED NOTES
Bedside report given to JERMAINE Hill  
ERP @ bedside.   
Pt medicated per MAR.  Tolerated well.    
Pt resting in bed, VSS on RA, GCS 15, NAD, pt aware POC to dc now, wife is coming to  pt  
Hospitals/Psychiatric Facilities

## 2023-09-14 NOTE — ED TRIAGE NOTES
"33 y.o.  Male    Chief Complaint   Patient presents with    Seizure       Pt BIB EMS with above complaint. Pt had episode of seizure for 30 seconds as witnessed by nephew who is driving him way to work. Denies CP and trauma.    Pt recently diagnosed with seizure on Keppra. Pt with history of asthma.  Pt with blood glucose level - 177 mg/dl.    Pt ambulatory back to room [address] with steady gait. Pt placed into gown and placed on the monitor.  Alert oriented not in any form of distress noted. Chart up for ERP to see.      /63   Pulse (!) 103   Temp 36.1 °C (97 °F) (Temporal)   Resp 19   Ht 1.803 m (5' 11\")   Wt 109 kg (240 lb)   SpO2 91%   BMI 33.47 kg/m²       "

## 2023-09-14 NOTE — ED PROVIDER NOTES
ER Provider Note    Scribed for Charan Schmitt M.D. by Chelo Gill. 9/14/2023   6:50 AM    Primary Care Provider: None noted    CHIEF COMPLAINT  Chief Complaint   Patient presents with    Seizure     EXTERNAL RECORDS REVIEWED  Outpatient Notes: The patient was seen here on August 24, 2023 after having a seizure. He saw neuro on August 28th.    HPI/ROS  LIMITATION TO HISTORY   Select: : None  OUTSIDE HISTORIAN(S):  None noted    Rafi Zapata Jr. is a 33 y.o. male who presents to the ED for evaluation of seizure-like activity prior to arrival. Per patient, he had an episode of a 30 second seizure witnessed by his nephew who was driving him to work. He denies any chest pain or trauma. The patient states he was recently diagnosed with a seizure disorder and has had 3 seizures this month. He adds that he takes his normal regimen of Keppra 500 mg bidaily. Patient adds that he has not been getting as much sleep lately and has been under increased stress from his father's death this year.     PAST MEDICAL HISTORY  Past Medical History:   Diagnosis Date    ASTHMA     Seizure disorder (HCC)      SURGICAL HISTORY  None noted    FAMILY HISTORY  None noted    SOCIAL HISTORY   reports that he has been smoking cigarettes. He has a 15.0 pack-year smoking history. He has never used smokeless tobacco. He reports that he does not currently use drugs after having used the following drugs: Inhaled. He reports that he does not drink alcohol.    CURRENT MEDICATIONS  Discharge Medication List as of 9/14/2023  7:24 AM        CONTINUE these medications which have NOT CHANGED    Details   EPINEPHrine (PRIMATENE MIST INH) Inhale., Historical Med      Vitamin D3 5000 Unit (125 mcg) Tab Take 1 Tablet by mouth every day., Disp-90 Tablet, R-3, Normal      Albuterol Sulfate 108 (90 Base) MCG/ACT AEROSOL POWDER, BREATH ACTIVATED Inhale  by mouth., Historical Med             ALLERGIES  No Known Allergies     PHYSICAL EXAM  /63    "Pulse (!) 103   Temp 36.1 °C (97 °F) (Temporal)   Resp 19   Ht 1.803 m (5' 11\")   Wt 109 kg (240 lb)   SpO2 91%   BMI 33.47 kg/m²      Nursing note and vitals reviewed.  Constitutional: Well-developed and well-nourished. No distress.   HENT: Head is normocephalic and atraumatic. Oropharynx is clear and moist without exudate or erythema.   Eyes: Pupils are equal, round, and reactive to light. Conjunctiva are normal.   Cardiovascular: Normal rate and regular rhythm. No murmur heard. Normal radial pulses.  Pulmonary/Chest: Breath sounds normal. No wheezes or rales.   Abdominal: Soft and non-tender. No distention    Musculoskeletal: Extremities exhibit normal range of motion without edema or tenderness.   Neurological: Awake, alert and oriented to person, place, and time. No focal deficits noted.  Skin: Skin is warm and dry. No rash.   Psychiatric: Normal mood and affect. Appropriate for clinical situation    INITIAL ASSESSMENT AND PLAN    6:50 AM - Patient was evaluated at bedside following a seizure. He is in no distress at this time. He takes his normal regimen of Keppra. He will be treated with Keppra 500 mg. He states he is comfortable with potentially raising his dosage. Patient verbalizes understanding and support with my plan of care.  The patient presents today following a seizure and has previously been diagnosed with a seizure disorder.    ED Observation Status? No; Patient does not meet criteria for ED Observation.      COURSE AND MEDICAL DECISION MAKING  7:17 AM I discussed the patient's case and the above findings with Dr. Badillo (Neuro) who who would like to raise the patient's dose to 1,000 mg of keppra Q 12. Will update patient.       DISPOSITION AND DISCUSSIONS    I have discussed management of the patient with the following physicians and ISREAL's: Dr. Badillo from neurology.  Discussion of management with other QHP or appropriate source(s): None     Escalation of care considered, and ultimately " not performed: acute inpatient care management, however at this time, the patient is most appropriate for outpatient management.    Barriers to care at this time, including but not limited to: Patient does not have established PCP.     Decision tools and prescription drugs considered including, but not limited to: Antibiotics I considered prescribing antibiotics, however I have not identified a bacterial source of infection.    The patient will return for new or worsening symptoms and is stable at the time of discharge.    The patient is referred to a primary physician for blood pressure management, diabetic screening, and for all other preventative health concerns.    The patient will return for new or worsening symptoms and is stable at the time of discharge.    The patient is referred to a primary physician for blood pressure management, diabetic screening, and for all other preventative health concerns.    DISPOSITION:  Patient will be discharged home in stable condition.    FOLLOW UP:  [facility], Emergency Dept  [address] [facility] [id number] [phone]    If symptoms worsen    Catracho Oshea M.D.  [address] [place] [id number]  [phone]    Schedule an appointment as soon as possible for a visit         OUTPATIENT MEDICATIONS:  Discharge Medication List as of 9/14/2023  7:24 AM          FINAL DIAGNOSIS  1. Seizure (HCC)    2. Seizure disorder (HCC)         Chelo ELLISON (Lynnette), am scribing for, and in the presence of, Charan Schmitt M.D..    Electronically signed by: Chelo Tena), 9/14/2023    Charan ELLISON M.D. personally performed the services described in this documentation, as scribed by Chelo Gill in my presence, and it is both accurate and complete.      The note accurately reflects work and decisions made by me.  Charan Schmitt M.D.  9/14/2023  12:49 PM

## 2023-09-26 ENCOUNTER — HOSPITAL ENCOUNTER (OUTPATIENT)
Dept: LAB | Facility: MEDICAL CENTER | Age: 33
End: 2023-09-26
Attending: PSYCHIATRY & NEUROLOGY
Payer: COMMERCIAL

## 2023-09-26 ENCOUNTER — OFFICE VISIT (OUTPATIENT)
Dept: NEUROLOGY | Facility: MEDICAL CENTER | Age: 33
End: 2023-09-26
Attending: PSYCHIATRY & NEUROLOGY
Payer: COMMERCIAL

## 2023-09-26 VITALS
SYSTOLIC BLOOD PRESSURE: 120 MMHG | HEART RATE: 97 BPM | OXYGEN SATURATION: 94 % | DIASTOLIC BLOOD PRESSURE: 80 MMHG | TEMPERATURE: 97.5 F

## 2023-09-26 DIAGNOSIS — R56.9 SEIZURE (HCC): ICD-10-CM

## 2023-09-26 PROCEDURE — 80177 DRUG SCRN QUAN LEVETIRACETAM: CPT

## 2023-09-26 PROCEDURE — 99211 OFF/OP EST MAY X REQ PHY/QHP: CPT | Performed by: PSYCHIATRY & NEUROLOGY

## 2023-09-26 PROCEDURE — 99215 OFFICE O/P EST HI 40 MIN: CPT | Performed by: PSYCHIATRY & NEUROLOGY

## 2023-09-26 PROCEDURE — 3074F SYST BP LT 130 MM HG: CPT | Performed by: PSYCHIATRY & NEUROLOGY

## 2023-09-26 PROCEDURE — 36415 COLL VENOUS BLD VENIPUNCTURE: CPT

## 2023-09-26 PROCEDURE — 3079F DIAST BP 80-89 MM HG: CPT | Performed by: PSYCHIATRY & NEUROLOGY

## 2023-09-26 RX ORDER — LEVETIRACETAM 1000 MG/1
1500 TABLET ORAL 2 TIMES DAILY
Qty: 90 TABLET | Refills: 5 | Status: SHIPPED | OUTPATIENT
Start: 2023-09-26 | End: 2023-12-04 | Stop reason: SDUPTHER

## 2023-09-26 NOTE — PROGRESS NOTES
"[facility] [place] [facility]ilepsy Program  Follow Up Visit      Patient's Name: Rafi Zapata Jr.  YOB: 1990  MRN: [id number]  Date of Service: 09/26/23    Referring Provider: Alfredito Valdivia M.D.  [address] [place] [address] Emergency Room  [place]  [place] [id number]    Chief Concern: Seizures.    The patient presents for a follow up. The patient presents with his wife and provides verbal consent for her to be present and provide additional history as necessary.      HPI (as obtained at the time of the initial visit and updated as necessary): The patient is a 33 y.o., right-handed male, who initially presented to my epilepsy clinic for evaluation of seizures on 08/28/2023.    The patient had his first known seizure on 08/13/2023, as noted under event type #1. He his second similar event on 08/24/2023, and was started on Keppra after the second event.    He never had isolated staring spells. No history of oral/manual automatisms. No history of gastric uprising. No autonomic phenomena. No myoclonus. No nocturnal seizures as of the time of the initial visit with me.     He has metallic/blood taste after eating chips.     He is on Keppra and has no adverse effects from it, specifically, no mood issues; there was fatigue at the start of Keppra, but this got better.     He has asthma is overall well controlled at this time.    He used to smoke marijuana heavily and stopped on 08/13/2023.     The patient used to smoke high potency marijuana, energy drinks, and was on Abx, and in that context, had an episode of bilateral body numbness that lasted for an hour and did not come back after he stopped drinking energy drinks.     Semiology:  Event type #1: \"Seizures\".  Year/Age of Onset: August 2023.  Initial features: Lightheadedness that started on top of his head and his head felt cold. He at times experiences karlee vu for several hours prior to the event. Smelling a strange smell (smoke-like).   Event features: Stared off, loses " "awareness, and turned his head to the right. This is followed by a bilateral tonic-clonic movements. He has no recollection for the time during the event and is he responsive during the event. No tongue bite. No bladder/bowel incontinence.  Post-ictal features: Feels very confused and sore after the event.   Duration: Around 1 minute.   Frequency: The first event was on 08/13/2023. The next event was on 08/24/2023. The next and the last event was on 09/14/2023.   Precipitating factors: Stress. Heat. Exposure to gun cleaning solvent (this was 3 hours prior to his initial seizure).     Event type #2: \"Spells\".  Year/Age of Onset: August 2023.  Initial features: Sensation of weird smell. Feeling lightheaded.   Event features: No confusion nor loss of awareness. He tries to focus on different things while experiencing above.   Post-ictal features: Back to baseline.  Duration: 1-2 minutes.  Frequency: At times in clusters (up to 5 per night).   Precipitating factors: Not clear.     History of status epilepticus: no  History of physical injury related to seizures: yes  History of surgery related to epilepsy: no  Family Planning: N/A  Current Driving Status: Not driving at this time.   Seizure Clusters: No   Longest Seizure Freedom: Not clear.     Pertinent Ancillary Test Results:    MRI brain studies:   - MRI brain (09/10/2023  at [facility]): \"1.  MRI of the brain without and with contrast within normal limits. 2.  Scattered paranasal sinus disease.\" I reviewed key images with the patient on 09/26/2023.    CT head studies:   - CT head wo contrast (08/14/2023 at [facility]): \"No acute intracranial abnormality. Paranasal sinusitis.\" I reviewed the images on 08/28/2023.     EEG studies:   - Standard EEG - none available for my review.     INTERIM HISTORY (09/26/2023): The patient had another focal to bilateral tonic-clonic seizure on 09/14/2023. He also had clusters of focal aware seizures (event type #2), which started in the " interim. He presented to ER after the [facility] and his Keppra was increased to 1000 mg BID, which he tolerates well (except some somnolence).     He unfortunately continues to have focal aware seizure despite being on Keppra 1000 mg BID.     Current Antiseizure Medications: Keppra 1000 mg BID.     Previously Tried Antiseizure Medications: None.     Review of Systems: No recent fevers. He gained some weight in the interim. The mood is overall stable. No SI/HI    Risk Factors For Epilepsy/Seizure Disorder: The patient is a product of normal pregnancy and uncomplicated delivery. The early development was normal and the patient started waking, talking, and engaging in social interaction as expected. There were no challenges during school and no reported attendance of special education programs. There is no history of febrile seizures. There is no history of head trauma. There is no history of stroke. There is no history of CNS infections, such as encephalitis and/or meningitis. There is no history of neurosurgical interventions. There is no reported history of staring spells during childhood/school years.    Past Medical History:  Past Medical History:   Diagnosis Date    ASTHMA     Seizure disorder (HCC)      Past Surgical History:  Ear surgery as child. Right leg fracture (not sure if there was a surgical repair).     Social History:  Social History     Tobacco Use    Smoking status: Every Day     Current packs/day: 1.00     Average packs/day: 1 pack/day for 15.0 years (15.0 ttl pk-yrs)     Types: Cigarettes    Smokeless tobacco: Never   Vaping Use    Vaping Use: Never used   Substance Use Topics    Alcohol use: No    Drug use: Not Currently     Types: Inhaled     Comment: marijuana     Family History:  There is no known family history of seizures/epilepsy.    [place] Suicide Reassessment  New or continued thoughts about killing self?: No  Preparing to end life?: No    Allergies:  No Known Allergies    Current  Medications:    Current Outpatient Medications:     levETIRAcetam, 1,000 mg, Oral, BID, Taking    EPINEPHrine (PRIMATENE MIST INH), Inhale., Taking    vitamin D3, 5,000 Units, Oral, DAILY, Taking    Albuterol Sulfate, Inhale  by mouth., Taking    Physical Examination:    Ambulatory Vitals  Encounter Vitals  Blood Pressure: 120/80  Temperature: 36.4 °C (97.5 °F)  Pulse: 97  Pulse Oximetry: 94 %  Encounter Vitals  Temperature: 36.4 °C (97.5 °F)  Blood Pressure: 120/80  Pulse: 97  Pulse Oximetry: 94 %    Neurological Examination:  Mental Status: The patient is alert and oriented to person, place, time, and situation. Speech is fluent, with no aphasia nor dysarthria noted. Affect is normal.    Cranial Nerve Examination:  CN I: Olfaction examination is deferred.  CN II: Visual fields are full to confrontation examination and show no visual field defect.   CN III, IV, VI: Eye movements are normal in all directions. Pupils are reactive to direct and consensual light. There is no relative afferent pupillary defect. There is no nystagmus.  CN V: Facial sensation to light touch is intact throughout.   CN VII: No significant facial muscle or other soft tissue asymmetry.  CN VIII: Hearing intact to rubbing sounds bilaterally.   CN IX, X: Soft palate elevates symmetrically.  CN XI: Symmetrical shoulder shrug exam.  CN XII: Midline tongue protrusion and moves symmetrically to each side.     Motor Examination:  Muscle strength is intact (5/5) throughout. Muscle tone is normal throughout. No abnormal movements are observed. No pronator drift is noted.     Muscle Stretch Reflexes Examination:  Muscle stretch reflexes are normal (2+) throughout and symmetric.    Sensory Examination:  Preserved sensation to light touch in all extremities.     Coordination:  Normal finger to nose testing bilaterally, no postural nor intentional tremor was noted.     Stance/gait:  Normal regular gait with normal arm swings and stride length. Able to  perform tandem gait. Romberg sign is absent.       ASSESSMENT AND PLAN:  1. 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     We discussed results of MRI brain.     At this time, his seizures are not fully controlled on Keppra 1000 mg BID, so we will increase it to 1500 mg BID. We discussed somnolence and mood issues as potential adverse effects of Keppra in details.  - levetiracetam (KEPPRA) 1000 MG tablet; Take 1.5 Tablets by mouth 2 times a day.  Dispense: 90 Tablet; Refill: 5    - Vitamin D3 5000 Unit (125 mcg) Tab; Take 1 Tablet by mouth every day.  Dispense: 90 Tablet; Refill: 3 (refills provided at the time of the initial visit).     We will obtain Keppra level (new order placed today):    - LEVETIRACETAM (KEPPRA), S    We will complete work up:    - 24-hour ambulatory EEG: Referral to Neurodiagnostics (EEG,EP,EMG/NCS/DBS) (still needed).     Provided [place] paperwork at the time of the initial visit.     Filled out DMV paperwork that he should not drive at the time of the initial visit.     Epilepsy counseling provided in writing.    Patient Instructions   Please take Keppra 1500 mg every 12 hours.     Please let our office know if you have any changes in your seizure frequency and/characteristics. Otherwise, please keep the diary of your events and bring it with you at the time of your next follow up visit with our office.     Please take vitamin D3 5000 internation units daily.     Please note that the following might precipitate seizures: missed doses of antiseizure medications, being sick with fever, stress, fatigue, sleep deprivation, not eating regularly, not drinking enough water, drinking too much alcohol, stopping alcohol suddenly if you are currently using it on a regular/daily basis, and/or using recreational drugs, among others.    Please note that the following might lead to an injury, potentially a life-threatening injury, in case you have a seizure and/or lose awareness  while:   - being in a large body of water by yourself, such as bath, pool, lake, ocean, among others (risk of drowning)   - being on unprotected heights (risk of fall)   - being around and/or operating heavy machinery (risk of injury)   - being around open fire/hot surfaces (risk of burns)   - any other activities/circumstances, in which if you lose awareness, you might injure yourself and/or others.    Please call for help (crisis line and/or 911) in case you have thoughts of harming yourself and/or others.    Please abstain from driving until further notice.    ------------------------------------------------------------------------------------------  Instructions for your family/caregivers:  Please call 911 if the patient has a seizure longer than 2-3 minutes, if seizures are back to back without him recovering to his baseline, or he does not start recovering within 5-10 minutes after the seizure stops. During the seizure - please turn him on his side, please make sure his head is protected (for example, you should put a pillow under his head, if one is available), and please do not put anything in his mouth.   -------------------------------------------------------------------------------------------    It is important that your seizures are well controlled and you have none or have them rarely. In addition to avoiding injury related to breakthrough seizures, frequent seizures increase risk of SUDEP (sudden unexpected death in epilepsy), where a person goes into a seizure and then never wakes up - this is a rare complication of seizure disorder; one of the best available ways to prevent it is to control your seizures well.     Due to the high volume of patients we are trying to help, your physician will not be able to respond by phone or in [employer] to your routine concerns between appointments.  This does not reflect a lack of interest or concern for you or your diagnosis.  Please bring these questions and  concerns to your appointment where your physician can answer.  Please relay more pressing concerns to our office, either via MyChart, or by phone; if not able to reach us please visit nearby Urgent Care Center or Emergency Department.  If any emergent medical needs, please seek emergent medical help and/or call 911.    Please note that we are not able to fill out paperwork that might be related to your work, utility company, disability, and/or driving, among others, in between the visits.  Please schedule a dedicated appointment to address your paperwork, so we can do that in a timely manner.  This is not due to lack of concern or interest for your disease-related work/administrative problems, but to make sure that we provide the best possible care and to fill out your paperwork in a correct and timely manner.    Thank you for entrusting your neurological care to [facility] and we look forward to continuing to serve you.     Follow up in 2 months.     Catracho Oshea MD  Outpatient Neurology   [facility]

## 2023-09-26 NOTE — PATIENT INSTRUCTIONS
Please take Keppra 1500 mg every 12 hours.     Please let our office know if you have any changes in your seizure frequency and/characteristics. Otherwise, please keep the diary of your events and bring it with you at the time of your next follow up visit with our office.     Please take vitamin D3 5000 internation units daily.     Please note that the following might precipitate seizures: missed doses of antiseizure medications, being sick with fever, stress, fatigue, sleep deprivation, not eating regularly, not drinking enough water, drinking too much alcohol, stopping alcohol suddenly if you are currently using it on a regular/daily basis, and/or using recreational drugs, among others.    Please note that the following might lead to an injury, potentially a life-threatening injury, in case you have a seizure and/or lose awareness while:   - being in a large body of water by yourself, such as bath, pool, lake, ocean, among others (risk of drowning)   - being on unprotected heights (risk of fall)   - being around and/or operating heavy machinery (risk of injury)   - being around open fire/hot surfaces (risk of burns)   - any other activities/circumstances, in which if you lose awareness, you might injure yourself and/or others.    Please call for help (crisis line and/or 911) in case you have thoughts of harming yourself and/or others.    Please abstain from driving until further notice.    ------------------------------------------------------------------------------------------  Instructions for your family/caregivers:  Please call 911 if the patient has a seizure longer than 2-3 minutes, if seizures are back to back without him recovering to his baseline, or he does not start recovering within 5-10 minutes after the seizure stops. During the seizure - please turn him on his side, please make sure his head is protected (for example, you should put a pillow under his head, if one is available), and please do not  put anything in his mouth.   -------------------------------------------------------------------------------------------    It is important that your seizures are well controlled and you have none or have them rarely. In addition to avoiding injury related to breakthrough seizures, frequent seizures increase risk of SUDEP (sudden unexpected death in epilepsy), where a person goes into a seizure and then never wakes up - this is a rare complication of seizure disorder; one of the best available ways to prevent it is to control your seizures well.     Due to the high volume of patients we are trying to help, your physician will not be able to respond by phone or in [employer] to your routine concerns between appointments.  This does not reflect a lack of interest or concern for you or your diagnosis.  Please bring these questions and concerns to your appointment where your physician can answer.  Please relay more pressing concerns to our office, either via [employer], or by phone; if not able to reach us please visit nearby Urgent Care Center or Emergency Department.  If any emergent medical needs, please seek emergent medical help and/or call 911.    Please note that we are not able to fill out paperwork that might be related to your work, utility company, disability, and/or driving, among others, in between the visits.  Please schedule a dedicated appointment to address your paperwork, so we can do that in a timely manner.  This is not due to lack of concern or interest for your disease-related work/administrative problems, but to make sure that we provide the best possible care and to fill out your paperwork in a correct and timely manner.    Thank you for entrusting your neurological care to [facility] and we look forward to continuing to serve you.

## 2023-09-27 ENCOUNTER — TELEPHONE (OUTPATIENT)
Dept: NEUROLOGY | Facility: MEDICAL CENTER | Age: 33
End: 2023-09-27
Payer: COMMERCIAL

## 2023-09-27 NOTE — TELEPHONE ENCOUNTER
Received Refill PA request via [employer]  for Levetiracetam 1000mg tab. (Quantity:90 tab, Day Supply:30)     Insurance: [employer]  Member ID:  [id number]  BIN: [id number]  PCN: [id number]  Group: [id number]     Ran Test claim via [place] & medication Pays for a $45.11 copay. Will outreach to patient to offer specialty pharmacy services and or release to preferred pharmacy

## 2023-09-28 LAB — LEVETIRACETAM SERPL-MCNC: 13 UG/ML (ref 10–40)

## 2023-10-24 ENCOUNTER — NON-PROVIDER VISIT (OUTPATIENT)
Dept: NEUROLOGY | Facility: MEDICAL CENTER | Age: 33
End: 2023-10-24
Attending: PSYCHIATRY & NEUROLOGY
Payer: COMMERCIAL

## 2023-10-24 DIAGNOSIS — R56.9 SEIZURE (HCC): ICD-10-CM

## 2023-10-24 PROCEDURE — 95700 EEG CONT REC W/VID EEG TECH: CPT | Performed by: PSYCHIATRY & NEUROLOGY

## 2023-10-24 PROCEDURE — 95708 EEG WO VID EA 12-26HR UNMNTR: CPT | Performed by: PSYCHIATRY & NEUROLOGY

## 2023-10-24 PROCEDURE — 95719 EEG PHYS/QHP EA INCR W/O VID: CPT | Performed by: PSYCHIATRY & NEUROLOGY

## 2023-10-24 NOTE — PROCEDURES
AMBULATORY ELECTROENCEPHALOGRAM REPORT      REFERRING PROVIDER:  Catracho Oshea M.D.  [address] [address]  [place] [id number]  DOS: 10/24/2023   DURATION OF RECORDING: (22 hours and 57 minutes)    INDICATION:  Rafi Zapata Jr. 33 y.o. male presenting with  seizure(s).    CURRENT OUTPATIENT MEDICATION LIST:   Current Outpatient Medications   Medication Instructions    Albuterol Sulfate 108 (90 Base) MCG/ACT AEROSOL POWDER, BREATH ACTIVATED Inhalation    EPINEPHrine (PRIMATENE MIST INH) Inhalation    levetiracetam (KEPPRA) 1,500 mg, Oral, 2 TIMES DAILY    vitamin D3 (CHOLECALCIFEROL) 5,000 Units, Oral, DAILY       TECHNIQUE:   The EEG was set up and taken down by a Neurodiagnostic technologist who performed education to patient and staff.  A minimum but not limited to 23 electrodes and 23 channel recording was setup and performed by Neurodiagnostic technologist. Impedances, electrode integrity, and technical impressions were documented a minimum of every 2-24 hour period by a Neurodiagnostic Technologist and reviewed by Interpreting Physician. There was at times significant muscle/electrical artifact, obscuring parts of the study.     DESCRIPTION OF THE RECORD:  During maximal wakefulness, the background was continuous and showed a 11 Hz posterior dominant rhythm.  Reactivity and state changes were present.  During drowsiness, theta/delta frequencies were seen.    Sleep was captured and was characterized by diffuse background delta/theta activity with a loss of myogenic artifact.  N2 sleep transients in the form of sleep spindles and vertex waves were seen in the leads over the central regions.     ICTAL AND INTERICTAL FINDINGS:   No focal or generalized epileptiform activity noted.     No regional slowing or persistent focal asymmetries were seen.    No seizures.    ACTIVATION PROCEDURES:   Intermittent Photic stimulation was performed in a stepwise fashion from 1 to 30 Hz and did not elicited any  abnormalities on EEG.     EKG: Sampling of the EKG recording showed overall normal heart rhythm, with some PVCs, but it was frequently obscured by artifact.    EVENTS:  Push button events and/or ambulatory diary events: No reported events on patient's diary. There was one event button push event, which appeared accidental.     INTERPRETATION:  This was, within the above noted limitations, a normal ambulatory EEG recording in the awake and drowsy/sleep state(s):  -No regional slowing or persistent focal asymmetries were seen.  -No epileptiform discharges or other epileptiform phenomena seen.  -No seizures. Clinical correlation is recommended.  -Clinical Events: None.  -Single lead EKG showed occasional PVCs - please correlate clinically.     Catracho Oshea MD  Neurology Attending, Epilepsy Program  [facility]

## 2023-10-25 ENCOUNTER — NON-PROVIDER VISIT (OUTPATIENT)
Dept: NEUROLOGY | Facility: MEDICAL CENTER | Age: 33
End: 2023-10-25
Attending: PSYCHIATRY & NEUROLOGY
Payer: COMMERCIAL

## 2023-12-04 ENCOUNTER — OFFICE VISIT (OUTPATIENT)
Dept: NEUROLOGY | Facility: MEDICAL CENTER | Age: 33
End: 2023-12-04
Attending: PSYCHIATRY & NEUROLOGY
Payer: COMMERCIAL

## 2023-12-04 VITALS
RESPIRATION RATE: 18 BRPM | BODY MASS INDEX: 36.42 KG/M2 | WEIGHT: 260.14 LBS | HEIGHT: 71 IN | OXYGEN SATURATION: 97 % | HEART RATE: 73 BPM | SYSTOLIC BLOOD PRESSURE: 112 MMHG | DIASTOLIC BLOOD PRESSURE: 64 MMHG | TEMPERATURE: 97.7 F

## 2023-12-04 DIAGNOSIS — R56.9 SEIZURE (HCC): ICD-10-CM

## 2023-12-04 PROCEDURE — 3078F DIAST BP <80 MM HG: CPT | Performed by: PSYCHIATRY & NEUROLOGY

## 2023-12-04 PROCEDURE — 99211 OFF/OP EST MAY X REQ PHY/QHP: CPT | Performed by: PSYCHIATRY & NEUROLOGY

## 2023-12-04 PROCEDURE — 3074F SYST BP LT 130 MM HG: CPT | Performed by: PSYCHIATRY & NEUROLOGY

## 2023-12-04 PROCEDURE — 99214 OFFICE O/P EST MOD 30 MIN: CPT | Performed by: PSYCHIATRY & NEUROLOGY

## 2023-12-04 RX ORDER — LEVETIRACETAM 1000 MG/1
1500 TABLET ORAL 2 TIMES DAILY
Qty: 90 TABLET | Refills: 5 | Status: SHIPPED | OUTPATIENT
Start: 2023-12-04

## 2023-12-04 ASSESSMENT — FIBROSIS 4 INDEX: FIB4 SCORE: 0.52

## 2023-12-04 NOTE — PROGRESS NOTES
"[facility] [place] [facility]ilepsy Program  Follow Up Visit      Patient's Name: Rafi Zapata Jr.  YOB: 1990  MRN: [id number]  Date of Service: 12/04/23    Referring Provider: Alfredito Valdivia M.D.  [address] [place] [address] Emergency Room  [place]  [place] [id number]    Chief Concern: Seizures.    The patient presents for a follow up. The patient presents with his wife and provides verbal consent for her to be present and provide additional history as necessary.      HPI (as obtained at the time of the initial visit and updated as necessary): The patient is a 33 y.o., right-handed male, who initially presented to my epilepsy clinic for evaluation of seizures on 08/28/2023.    The patient had his first known seizure on 08/13/2023, as noted under event type #1. He his second similar event on 08/24/2023, and was started on Keppra after the second event.    He never had isolated staring spells. No history of oral/manual automatisms. No history of gastric uprising. No autonomic phenomena. No myoclonus. No nocturnal seizures as of the time of the initial visit with me.     He has metallic/blood taste after eating chips.     He is on Keppra and has no adverse effects from it, specifically, no mood issues; there was fatigue at the start of Keppra, but this got better.     He has asthma is overall well controlled at this time.    He used to smoke marijuana heavily and stopped on 08/13/2023.     The patient used to smoke high potency marijuana, energy drinks, and was on Abx, and in that context, had an episode of bilateral body numbness that lasted for an hour and did not come back after he stopped drinking energy drinks.     Semiology:  Event type #1: \"Seizures\".  Year/Age of Onset: August 2023.  Initial features: Lightheadedness that started on top of his head and his head felt cold. He at times experiences karlee vu for several hours prior to the event. Smelling a strange smell (smoke-like).   Event features: Stared off, loses " "awareness, and turned his head to the right. This is followed by a bilateral tonic-clonic movements. He has no recollection for the time during the event and is he responsive during the event. No tongue bite. No bladder/bowel incontinence.  Post-ictal features: Feels very confused and sore after the event.   Duration: Around 1 minute.   Frequency: The first event was on 08/13/2023. The next event was on 08/24/2023. The next and the last event was on 09/14/2023.   Precipitating factors: Stress. Heat. Exposure to gun cleaning solvent (this was 3 hours prior to his initial seizure).     Event type #2: \"Spells\".  Year/Age of Onset: August 2023.  Initial features: Sensation of weird smell. Feeling lightheaded.   Event features: No confusion nor loss of awareness. He tries to focus on different things while experiencing above.   Post-ictal features: Back to baseline.  Duration: 1-2 minutes.  Frequency: At times in clusters (up to 5 per night). None since end of September 2023.   Precipitating factors: Not clear.     History of status epilepticus: no  History of physical injury related to seizures: yes  History of surgery related to epilepsy: no  Family Planning: N/A  Current Driving Status: Not driving at this time.   Seizure Clusters: No   Longest Seizure Freedom: Not clear.     Pertinent Ancillary Test Results:    MRI brain studies:   - MRI brain (09/10/2023  at [facility]): \"1.  MRI of the brain without and with contrast within normal limits. 2.  Scattered paranasal sinus disease.\" I reviewed key images with the patient on 09/26/2023.    CT head studies:   - CT head wo contrast (08/14/2023 at [facility]): \"No acute intracranial abnormality. Paranasal sinusitis.\" I reviewed the images on 08/28/2023.     EEG studies:   - Standard EEG - none available for my review.      - 23-hour ambulatory EEG (10/24/2023, Dr. Oshea): This was, within the above noted limitations, a normal ambulatory EEG recording in the awake and " drowsy/sleep state(s):  -No regional slowing or persistent focal asymmetries were seen.  -No epileptiform discharges or other epileptiform phenomena seen.  -No seizures. Clinical correlation is recommended.  -Clinical Events: None.  -Single lead EKG showed occasional PVCs - please correlate clinically.     INTERIM HISTORY (12/04/2023): The patient is doing quite well at this time. He had no seizures since late September 2023. Specifically, he had no episodes of lightheadedness/strange smells/confusion/loss of awareness/convulsions, nor events suspicious for nocturnal seizures in late September 2023. This change occurred with Keppra 1500 mg BID. He is tolerating this dose of Keppra well.     Current Antiseizure Medications: Keppra 1500 mg BID.     Previously Tried Antiseizure Medications: None.     Review of Systems: No recent fevers. He gained some weight in the interim. The mood is overall stable. No SI/HI    Risk Factors For Epilepsy/Seizure Disorder: The patient is a product of normal pregnancy and uncomplicated delivery. The early development was normal and the patient started waking, talking, and engaging in social interaction as expected. There were no challenges during school and no reported attendance of special education programs. There is no history of febrile seizures. There is no history of head trauma. There is no history of stroke. There is no history of CNS infections, such as encephalitis and/or meningitis. There is no history of neurosurgical interventions. There is no reported history of staring spells during childhood/school years.    Past Medical History:  Past Medical History:   Diagnosis Date    ASTHMA     Seizure disorder (HCC)      Past Surgical History:  Ear surgery as child. Right leg fracture (not sure if there was a surgical repair).     Social History:  Social History     Tobacco Use    Smoking status: Every Day     Current packs/day: 1.00     Average packs/day: 1 pack/day for 15.0 years  "(15.0 ttl pk-yrs)     Types: Cigarettes    Smokeless tobacco: Never   Vaping Use    Vaping Use: Never used   Substance Use Topics    Alcohol use: No    Drug use: Not Currently     Types: Inhaled     Comment: marijuana     Family History:  There is no known family history of seizures/epilepsy.    [place] Suicide Reassessment  New or continued thoughts about killing self?: No  Preparing to end life?: No    Allergies:  No Known Allergies    Current Medications:    Current Outpatient Medications:     levetiracetam, 1,500 mg, Oral, BID, Taking    EPINEPHrine (PRIMATENE MIST INH), Inhale., PRN    vitamin D3, 5,000 Units, Oral, DAILY, Taking    Albuterol Sulfate, Inhale  by mouth., PRN    Physical Examination:    Ambulatory Vitals  Encounter Vitals  Temperature: 36.5 °C (97.7 °F)  Temp src: Temporal  Blood Pressure: 112/64  Pulse: 73  Respiration: 18  Pulse Oximetry: 97 %  Weight: 118 kg (260 lb 2.3 oz)  Height: 180.3 cm (5' 11\")  BMI (Calculated): 36.28    Neurological Examination:  Mental Status: The patient is alert and oriented to person, place, time, and situation. Speech is fluent, with no aphasia nor dysarthria noted. Affect is normal.    Cranial Nerve Examination:  CN I: Olfaction examination is deferred.  CN II: Visual fields are full to confrontation examination and show no visual field defect.   CN III, IV, VI: Eye movements are normal in all directions. Pupils are reactive to direct and consensual light. There is no relative afferent pupillary defect. There is no nystagmus.  CN V: Facial sensation to light touch is intact throughout.   CN VII: No significant facial muscle or other soft tissue asymmetry.  CN VIII: Hearing intact to rubbing sounds bilaterally.   CN IX, X: Soft palate elevates symmetrically.  CN XI: Symmetrical shoulder shrug exam.  CN XII: Midline tongue protrusion and moves symmetrically to each side.     Motor Examination:  Muscle strength is intact (5/5) throughout. Muscle tone is normal " throughout. No abnormal movements are observed. No pronator drift is noted.     Muscle Stretch Reflexes Examination:  Muscle stretch reflexes are normal (2+) throughout and symmetric.    Sensory Examination:  Preserved sensation to light touch in all extremities.     Coordination:  Normal finger to nose testing bilaterally, no postural nor intentional tremor was noted.     Stance/gait:  Normal regular gait with normal arm swings and stride length. Able to perform tandem gait. Romberg sign is absent.       Labs:   - Keppra (09/26/2023): 13    ASSESSMENT AND PLAN:  1. 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     We discussed results the 1-day ambulatory EEG, which came back normal.    At this time, his seizures are well controlled, and we will continue Keppra 1500 mg BID with no changes.  We discussed somnolence and mood issues as potential adverse effects of Keppra in details.  - levetiracetam (KEPPRA) 1000 MG tablet; Take 1.5 Tablets by mouth 2 times a day.  Dispense: 90 Tablet; Refill: 5    - Vitamin D3 5000 Unit (125 mcg) Tab; Take 1 Tablet by mouth every day.  Dispense: 90 Tablet; Refill: 3 (refills provided at the time of the initial visit).     We will obtain Keppra level again to establish level of Keppra that is therapeutic for the patient at this time (new order placed today):    - LEVETIRACETAM (KEPPRA), S    Provided FMLA paperwork at the time of the initial visit.     Filled out DMV paperwork that he should not drive at the time of the initial visit.     If he remains seizure free until early January 2024 - he will be cleared for driving at that time. He will have a 20 minute visit at the time to ensure seizure freedom and get the paperwork. At that time, we will discuss a need for letter for work addressing risks for breakthrough seizures.     Epilepsy counseling provided in writing.    He was advised to follow up with PCP for PVCs.    Patient Instructions   Please take  Keppra 1500 mg every 12 hours.     Please let our office know if you have any changes in your seizure frequency and/characteristics. Otherwise, please keep the diary of your events and bring it with you at the time of your next follow up visit with our office.     Please take vitamin D3 5000 internation units daily.     Please note that the following might precipitate seizures: missed doses of antiseizure medications, being sick with fever, stress, fatigue, sleep deprivation, not eating regularly, not drinking enough water, drinking too much alcohol, stopping alcohol suddenly if you are currently using it on a regular/daily basis, and/or using recreational drugs, among others.    Please note that the following might lead to an injury, potentially a life-threatening injury, in case you have a seizure and/or lose awareness while:   - being in a large body of water by yourself, such as bath, pool, lake, ocean, among others (risk of drowning)   - being on unprotected heights (risk of fall)   - being around and/or operating heavy machinery (risk of injury)   - being around open fire/hot surfaces (risk of burns)   - any other activities/circumstances, in which if you lose awareness, you might injure yourself and/or others.    Please call for help (crisis line and/or 911) in case you have thoughts of harming yourself and/or others.    Please abstain from driving until further notice.    ------------------------------------------------------------------------------------------  Instructions for your family/caregivers:  Please call 911 if the patient has a seizure longer than 2-3 minutes, if seizures are back to back without him recovering to his baseline, or he does not start recovering within 5-10 minutes after the seizure stops. During the seizure - please turn him on his side, please make sure his head is protected (for example, you should put a pillow under his head, if one is available), and please do not put anything  in his mouth.   -------------------------------------------------------------------------------------------    It is important that your seizures are well controlled and you have none or have them rarely. In addition to avoiding injury related to breakthrough seizures, frequent seizures increase risk of SUDEP (sudden unexpected death in epilepsy), where a person goes into a seizure and then never wakes up - this is a rare complication of seizure disorder; one of the best available ways to prevent it is to control your seizures well.     Due to the high volume of patients we are trying to help, your physician will not be able to respond by phone or in Lesson Prephart to your routine concerns between appointments.  This does not reflect a lack of interest or concern for you or your diagnosis.  Please bring these questions and concerns to your appointment where your physician can answer.  Please relay more pressing concerns to our office, either via Lesson Prephart, or by phone; if not able to reach us please visit nearby Urgent Care Center or Emergency Department.  If any emergent medical needs, please seek emergent medical help and/or call 911.    Please note that we are not able to fill out paperwork that might be related to your work, utility company, disability, and/or driving, among others, in between the visits.  Please schedule a dedicated appointment to address your paperwork, so we can do that in a timely manner.  This is not due to lack of concern or interest for your disease-related work/administrative problems, but to make sure that we provide the best possible care and to fill out your paperwork in a correct and timely manner.    Thank you for entrusting your neurological care to [facility] and we look forward to continuing to serve you.       Follow up in 6 months for a clinical follow up, and follow up in early January 2024 for a 20 minutes paperwork visit, to clear the patient for driving, if he remains seizure  free.     Catracho Oshea MD  Outpatient Neurology   [facility]

## 2023-12-04 NOTE — PATIENT INSTRUCTIONS
Please take Keppra 1500 mg every 12 hours.     Please let our office know if you have any changes in your seizure frequency and/characteristics. Otherwise, please keep the diary of your events and bring it with you at the time of your next follow up visit with our office.     Please take vitamin D3 5000 internation units daily.     Please note that the following might precipitate seizures: missed doses of antiseizure medications, being sick with fever, stress, fatigue, sleep deprivation, not eating regularly, not drinking enough water, drinking too much alcohol, stopping alcohol suddenly if you are currently using it on a regular/daily basis, and/or using recreational drugs, among others.    Please note that the following might lead to an injury, potentially a life-threatening injury, in case you have a seizure and/or lose awareness while:   - being in a large body of water by yourself, such as bath, pool, lake, ocean, among others (risk of drowning)   - being on unprotected heights (risk of fall)   - being around and/or operating heavy machinery (risk of injury)   - being around open fire/hot surfaces (risk of burns)   - any other activities/circumstances, in which if you lose awareness, you might injure yourself and/or others.    Please call for help (crisis line and/or 911) in case you have thoughts of harming yourself and/or others.    Please abstain from driving until further notice.    ------------------------------------------------------------------------------------------  Instructions for your family/caregivers:  Please call 911 if the patient has a seizure longer than 2-3 minutes, if seizures are back to back without him recovering to his baseline, or he does not start recovering within 5-10 minutes after the seizure stops. During the seizure - please turn him on his side, please make sure his head is protected (for example, you should put a pillow under his head, if one is available), and please do not  put anything in his mouth.   -------------------------------------------------------------------------------------------    It is important that your seizures are well controlled and you have none or have them rarely. In addition to avoiding injury related to breakthrough seizures, frequent seizures increase risk of SUDEP (sudden unexpected death in epilepsy), where a person goes into a seizure and then never wakes up - this is a rare complication of seizure disorder; one of the best available ways to prevent it is to control your seizures well.     Due to the high volume of patients we are trying to help, your physician will not be able to respond by phone or in OnetoOnetexthart to your routine concerns between appointments.  This does not reflect a lack of interest or concern for you or your diagnosis.  Please bring these questions and concerns to your appointment where your physician can answer.  Please relay more pressing concerns to our office, either via OnetoOnetexthart, or by phone; if not able to reach us please visit nearby Urgent Care Center or Emergency Department.  If any emergent medical needs, please seek emergent medical help and/or call 911.    Please note that we are not able to fill out paperwork that might be related to your work, utility company, disability, and/or driving, among others, in between the visits.  Please schedule a dedicated appointment to address your paperwork, so we can do that in a timely manner.  This is not due to lack of concern or interest for your disease-related work/administrative problems, but to make sure that we provide the best possible care and to fill out your paperwork in a correct and timely manner.    Thank you for entrusting your neurological care to [facility] and we look forward to continuing to serve you.

## 2024-01-03 ENCOUNTER — OFFICE VISIT (OUTPATIENT)
Dept: NEUROLOGY | Facility: MEDICAL CENTER | Age: 34
End: 2024-01-03
Attending: PSYCHIATRY & NEUROLOGY
Payer: COMMERCIAL

## 2024-01-03 VITALS
HEIGHT: 71 IN | WEIGHT: 259.48 LBS | DIASTOLIC BLOOD PRESSURE: 72 MMHG | BODY MASS INDEX: 36.33 KG/M2 | SYSTOLIC BLOOD PRESSURE: 122 MMHG | TEMPERATURE: 97.4 F | OXYGEN SATURATION: 93 % | HEART RATE: 94 BPM

## 2024-01-03 DIAGNOSIS — R56.9 SEIZURE (HCC): ICD-10-CM

## 2024-01-03 PROCEDURE — 3078F DIAST BP <80 MM HG: CPT | Performed by: PSYCHIATRY & NEUROLOGY

## 2024-01-03 PROCEDURE — 99211 OFF/OP EST MAY X REQ PHY/QHP: CPT | Performed by: PSYCHIATRY & NEUROLOGY

## 2024-01-03 PROCEDURE — 99212 OFFICE O/P EST SF 10 MIN: CPT | Performed by: PSYCHIATRY & NEUROLOGY

## 2024-01-03 PROCEDURE — 3074F SYST BP LT 130 MM HG: CPT | Performed by: PSYCHIATRY & NEUROLOGY

## 2024-01-03 ASSESSMENT — FIBROSIS 4 INDEX: FIB4 SCORE: 0.52

## 2024-01-03 ASSESSMENT — PATIENT HEALTH QUESTIONNAIRE - PHQ9: CLINICAL INTERPRETATION OF PHQ2 SCORE: 0

## 2024-01-03 NOTE — PATIENT INSTRUCTIONS
Please take Keppra 1500 mg every 12 hours.     Please let our office know if you have any changes in your seizure frequency and/characteristics. Otherwise, please keep the diary of your events and bring it with you at the time of your next follow up visit with our office.     Please take vitamin D3 5000 internation units daily.     Please note that the following might precipitate seizures: missed doses of antiseizure medications, being sick with fever, stress, fatigue, sleep deprivation, not eating regularly, not drinking enough water, drinking too much alcohol, stopping alcohol suddenly if you are currently using it on a regular/daily basis, and/or using recreational drugs, among others.    Please note that the following might lead to an injury, potentially a life-threatening injury, in case you have a seizure and/or lose awareness while:   - being in a large body of water by yourself, such as bath, pool, lake, ocean, among others (risk of drowning)   - being on unprotected heights (risk of fall)   - being around and/or operating heavy machinery (risk of injury)   - being around open fire/hot surfaces (risk of burns)   - any other activities/circumstances, in which if you lose awareness, you might injure yourself and/or others.    Please call for help (crisis line and/or 911) in case you have thoughts of harming yourself and/or others.    Please stop driving if you experience any changes/loss of awareness and/or seizures.     ------------------------------------------------------------------------------------------  Instructions for your family/caregivers:  Please call 911 if the patient has a seizure longer than 2-3 minutes, if seizures are back to back without him recovering to his baseline, or he does not start recovering within 5-10 minutes after the seizure stops. During the seizure - please turn him on his side, please make sure his head is protected (for example, you should put a pillow under his head, if  one is available), and please do not put anything in his mouth.   -------------------------------------------------------------------------------------------    It is important that your seizures are well controlled and you have none or have them rarely. In addition to avoiding injury related to breakthrough seizures, frequent seizures increase risk of SUDEP (sudden unexpected death in epilepsy), where a person goes into a seizure and then never wakes up - this is a rare complication of seizure disorder; one of the best available ways to prevent it is to control your seizures well.     Due to the high volume of patients we are trying to help, your physician will not be able to respond by phone or in JustBookhart to your routine concerns between appointments.  This does not reflect a lack of interest or concern for you or your diagnosis.  Please bring these questions and concerns to your appointment where your physician can answer.  Please relay more pressing concerns to our office, either via JustBookhart, or by phone; if not able to reach us please visit nearby Urgent Care Center or Emergency Department.  If any emergent medical needs, please seek emergent medical help and/or call 911.    Please note that we are not able to fill out paperwork that might be related to your work, utility company, disability, and/or driving, among others, in between the visits.  Please schedule a dedicated appointment to address your paperwork, so we can do that in a timely manner.  This is not due to lack of concern or interest for your disease-related work/administrative problems, but to make sure that we provide the best possible care and to fill out your paperwork in a correct and timely manner.    Thank you for entrusting your neurological care to [facility] and we look forward to continuing to serve you.

## 2024-01-03 NOTE — PROGRESS NOTES
"[facility]  Follow Up Visit      Patient's Name: Rafi Zapata Jr.  YOB: 1990  MRN: [id number]  Date of Service: 01/03/23    Referring Provider: Alfredito Valdivia M.D.  [address] [place] [address] Emergency Room  [place]  [place] [id number]    Chief Concern: Seizures.    The patient presents for a follow up. The patient presents with his wife and provides verbal consent for her to be present and provide additional history as necessary.      HPI (as obtained at the time of the initial visit and updated as necessary): The patient is a 33 y.o., right-handed male, who initially presented to my epilepsy clinic for evaluation of seizures on 08/28/2023.    The patient had his first known seizure on 08/13/2023, as noted under event type #1. He his second similar event on 08/24/2023, and was started on Keppra after the second event.    He never had isolated staring spells. No history of oral/manual automatisms. No history of gastric uprising. No autonomic phenomena. No myoclonus. No nocturnal seizures as of the time of the initial visit with me.     He has metallic/blood taste after eating chips.     He is on Keppra and has no adverse effects from it, specifically, no mood issues; there was fatigue at the start of Keppra, but this got better.     He has asthma is overall well controlled at this time.    He used to smoke marijuana heavily and stopped on 08/13/2023.     The patient used to smoke high potency marijuana, energy drinks, and was on Abx, and in that context, had an episode of bilateral body numbness that lasted for an hour and did not come back after he stopped drinking energy drinks.     Semiology:  Event type #1: \"Seizures\".  Year/Age of Onset: August 2023.  Initial features: Lightheadedness that started on top of his head and his head felt cold. He at times experiences karlee vu for several hours prior to the event. Smelling a strange smell (smoke-like).   Event features: Stared off, loses " "awareness, and turned his head to the right. This is followed by a bilateral tonic-clonic movements. He has no recollection for the time during the event and is he responsive during the event. No tongue bite. No bladder/bowel incontinence.  Post-ictal features: Feels very confused and sore after the event.   Duration: Around 1 minute.   Frequency: The first event was on 08/13/2023. The next event was on 08/24/2023. The next and the last event was on 09/14/2023.   Precipitating factors: Stress. Heat. Exposure to gun cleaning solvent (this was 3 hours prior to his initial seizure).     Event type #2: \"Spells\".  Year/Age of Onset: August 2023.  Initial features: Sensation of weird smell. Feeling lightheaded.   Event features: No confusion nor loss of awareness. He tries to focus on different things while experiencing above.   Post-ictal features: Back to baseline.  Duration: 1-2 minutes.  Frequency: At times in clusters (up to 5 per night). None since end of September 2023.   Precipitating factors: Not clear.     History of status epilepticus: no  History of physical injury related to seizures: yes  History of surgery related to epilepsy: no  Family Planning: N/A  Current Driving Status: Not driving at this time.   Seizure Clusters: No   Longest Seizure Freedom: Not clear.     Pertinent Ancillary Test Results:    MRI brain studies:   - MRI brain (09/10/2023  at [facility]): \"1.  MRI of the brain without and with contrast within normal limits. 2.  Scattered paranasal sinus disease.\" I reviewed key images with the patient on 09/26/2023.    CT head studies:   - CT head wo contrast (08/14/2023 at [facility]): \"No acute intracranial abnormality. Paranasal sinusitis.\" I reviewed the images on 08/28/2023.     EEG studies:   - Standard EEG - none available for my review.      - 23-hour ambulatory EEG (10/24/2023, Dr. Oshea): This was, within the above noted limitations, a normal ambulatory EEG recording in the awake and " drowsy/sleep state(s):  -No regional slowing or persistent focal asymmetries were seen.  -No epileptiform discharges or other epileptiform phenomena seen.  -No seizures. Clinical correlation is recommended.  -Clinical Events: None.  -Single lead EKG showed occasional PVCs - please correlate clinically.     INTERIM HISTORY (01/03/2023): The patient is doing quite well at this time. He had no seizures since late September 2023. Specifically, he  again had no episodes of lightheadedness/strange smells/confusion/loss of awareness/convulsions, nor events suspicious for nocturnal seizures in late September 2023. He is taking Keppra 1500 mg BID regularly and has no adverse effects from it.    He is presenting today to complete DMV paperwork in context of seizure freedom.      Current Antiseizure Medications: Keppra 1500 mg BID.     Previously Tried Antiseizure Medications: None.     Review of Systems: No recent fevers. His weight is stable at this time. The mood is overall stable. No SI/HI    Risk Factors For Epilepsy/Seizure Disorder: The patient is a product of normal pregnancy and uncomplicated delivery. The early development was normal and the patient started waking, talking, and engaging in social interaction as expected. There were no challenges during school and no reported attendance of special education programs. There is no history of febrile seizures. There is no history of head trauma. There is no history of stroke. There is no history of CNS infections, such as encephalitis and/or meningitis. There is no history of neurosurgical interventions. There is no reported history of staring spells during childhood/school years.    Past Medical History:  Past Medical History:   Diagnosis Date    ASTHMA     Seizure disorder (HCC)      Past Surgical History:  Ear surgery as child. Right leg fracture (not sure if there was a surgical repair).     Social History:  Social History     Tobacco Use    Smoking status: Every Day  "    Current packs/day: 1.00     Average packs/day: 1 pack/day for 15.0 years (15.0 ttl pk-yrs)     Types: Cigarettes    Smokeless tobacco: Never   Vaping Use    Vaping Use: Never used   Substance Use Topics    Alcohol use: No    Drug use: Not Currently     Types: Inhaled     Comment: marijuana     Family History:  There is no known family history of seizures/epilepsy.    [place] Suicide Reassessment  New or continued thoughts about killing self?: No  Preparing to end life?: No    Allergies:  No Known Allergies    Current Medications:    Current Outpatient Medications:     levetiracetam, 1,500 mg, Oral, BID, Taking    EPINEPHrine (PRIMATENE MIST INH), Inhale., PRN    vitamin D3, 5,000 Units, Oral, DAILY, Taking    Albuterol Sulfate, Inhale  by mouth., Taking    Physical Examination:    Ambulatory Vitals  Encounter Vitals  Temperature: 36.3 °C (97.4 °F)  Temp src: Temporal  Blood Pressure: 122/72  Pulse: 94  Pulse Oximetry: 93 %  Weight: 118 kg (259 lb 7.7 oz)  Height: 180.3 cm (5' 11\")  BMI (Calculated): 36.19    Neurological Examination:  Mental Status: The patient is alert and oriented to person, place, time, and situation. Speech is fluent, with no aphasia nor dysarthria noted. Affect is normal.    Cranial Nerve Examination:  CN I: Olfaction examination is deferred.  CN II: Visual fields are full to confrontation examination and show no visual field defect.   CN III, IV, VI: Eye movements are normal in all directions. Pupils are reactive to direct and consensual light. There is no relative afferent pupillary defect. There is no nystagmus.  CN V: Facial sensation to light touch is intact throughout.   CN VII: No significant facial muscle or other soft tissue asymmetry.  CN VIII: Hearing intact to rubbing sounds bilaterally.   CN IX, X: Soft palate elevates symmetrically.  CN XI: Symmetrical shoulder shrug exam.  CN XII: Midline tongue protrusion and moves symmetrically to each side.     Motor Examination:  Muscle " strength is intact (5/5) throughout. Muscle tone is normal throughout. No abnormal movements are observed. No pronator drift is noted.     Muscle Stretch Reflexes Examination:  Muscle stretch reflexes are normal (2+) throughout and symmetric.    Sensory Examination:  Preserved sensation to light touch in all extremities.     Coordination:  Normal finger to nose testing bilaterally, no postural nor intentional tremor was noted.     Stance/gait:  Normal regular gait with normal arm swings and stride length. Able to perform tandem gait. Romberg sign is absent.       Labs:   - Keppra (09/26/2023): 13    ASSESSMENT AND PLAN:  1. 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     We will continue Keppra with no changes, since he is tolerating it well. No needs for refills today.   - Keppra 1500 mg BID.   - he will continue vitamin D  supplementation as well.     We will obtain Keppra level again to establish level of Keppra that is therapeutic for the patient at this time (new order placed in December 2023):    - LEVETIRACETAM (KEPPRA), S    Provided FMLA paperwork at the time of the initial visit.     Filled out DMV paperwork that he should not drive at the time of the initial visit.     Filled DMV paperwork on 01/03/2024 that he is cleared to drive.     Epilepsy counseling provided in writing.    He was advised to follow up with PCP for PVCs.    Patient Instructions   Please take Keppra 1500 mg every 12 hours.     Please let our office know if you have any changes in your seizure frequency and/characteristics. Otherwise, please keep the diary of your events and bring it with you at the time of your next follow up visit with our office.     Please take vitamin D3 5000 internation units daily.     Please note that the following might precipitate seizures: missed doses of antiseizure medications, being sick with fever, stress, fatigue, sleep deprivation, not eating regularly, not drinking enough  water, drinking too much alcohol, stopping alcohol suddenly if you are currently using it on a regular/daily basis, and/or using recreational drugs, among others.    Please note that the following might lead to an injury, potentially a life-threatening injury, in case you have a seizure and/or lose awareness while:   - being in a large body of water by yourself, such as bath, pool, lake, ocean, among others (risk of drowning)   - being on unprotected heights (risk of fall)   - being around and/or operating heavy machinery (risk of injury)   - being around open fire/hot surfaces (risk of burns)   - any other activities/circumstances, in which if you lose awareness, you might injure yourself and/or others.    Please call for help (crisis line and/or 911) in case you have thoughts of harming yourself and/or others.    Please stop driving if you experience any changes/loss of awareness and/or seizures.     ------------------------------------------------------------------------------------------  Instructions for your family/caregivers:  Please call 911 if the patient has a seizure longer than 2-3 minutes, if seizures are back to back without him recovering to his baseline, or he does not start recovering within 5-10 minutes after the seizure stops. During the seizure - please turn him on his side, please make sure his head is protected (for example, you should put a pillow under his head, if one is available), and please do not put anything in his mouth.   -------------------------------------------------------------------------------------------    It is important that your seizures are well controlled and you have none or have them rarely. In addition to avoiding injury related to breakthrough seizures, frequent seizures increase risk of SUDEP (sudden unexpected death in epilepsy), where a person goes into a seizure and then never wakes up - this is a rare complication of seizure disorder; one of the best available  ways to prevent it is to control your seizures well.     Due to the high volume of patients we are trying to help, your physician will not be able to respond by phone or in MyChart to your routine concerns between appointments.  This does not reflect a lack of interest or concern for you or your diagnosis.  Please bring these questions and concerns to your appointment where your physician can answer.  Please relay more pressing concerns to our office, either via MyChart, or by phone; if not able to reach us please visit nearby Urgent Care Center or Emergency Department.  If any emergent medical needs, please seek emergent medical help and/or call 911.    Please note that we are not able to fill out paperwork that might be related to your work, utility company, disability, and/or driving, among others, in between the visits.  Please schedule a dedicated appointment to address your paperwork, so we can do that in a timely manner.  This is not due to lack of concern or interest for your disease-related work/administrative problems, but to make sure that we provide the best possible care and to fill out your paperwork in a correct and timely manner.    Thank you for entrusting your neurological care to [facility] and we look forward to continuing to serve you.     Follow up in April 2024, as scheduled.     Catracho Oshea MD  Outpatient Neurology   [facility]

## 2024-02-08 ENCOUNTER — HOSPITAL ENCOUNTER (OUTPATIENT)
Dept: LAB | Facility: MEDICAL CENTER | Age: 34
End: 2024-02-08
Attending: PSYCHIATRY & NEUROLOGY
Payer: COMMERCIAL

## 2024-02-08 PROCEDURE — 80177 DRUG SCRN QUAN LEVETIRACETAM: CPT

## 2024-02-08 PROCEDURE — 36415 COLL VENOUS BLD VENIPUNCTURE: CPT

## 2024-02-09 LAB — LEVETIRACETAM SERPL-MCNC: 16 UG/ML (ref 10–40)

## 2024-04-01 ENCOUNTER — TELEPHONE (OUTPATIENT)
Dept: NEUROLOGY | Facility: MEDICAL CENTER | Age: 34
End: 2024-04-01
Payer: COMMERCIAL

## 2024-04-01 NOTE — TELEPHONE ENCOUNTER
NEUROLOGY PATIENT PRE-VISIT PLANNING     Patient was NOT contacted to complete PVP.  Note: Patient will not be contacted if there is no indication to call.     Patient Appointment is scheduled as: Established Patient     Is visit type and length scheduled correctly? Yes    [facility] Patient Demographics? Yes    3.   Is referral attached to visit? Yes    4. Were records received from referring provider?Yes    4. Patient was NOT contacted to have someone accompany them to visit.     5. Is this appointment scheduled as a Hospital Follow-Up?  No    6. Does the patient require any pre procedure or post procedure follow up? No    7. If any orders were placed at last visit or intended to be done for this visit do we have Results/Consult Notes? No  Labs - Labs ordered, completed on 2.8.2024 and results are in chart.  Imaging - Imaging was not ordered at last office visit.  Referrals - No referrals were ordered at last office visit.  Note: If patient appointment is for lab or imaging review and patient did not complete the studies, check with provider if OK to reschedule patient until completed.    8. If patient appointment is for Botox - is order pended for provider? N/A    9. Was Plan Assessment from last Neurology Office Visit Reviewed?  Yes

## 2024-04-10 ENCOUNTER — OFFICE VISIT (OUTPATIENT)
Dept: NEUROLOGY | Facility: MEDICAL CENTER | Age: 34
End: 2024-04-10
Attending: PSYCHIATRY & NEUROLOGY
Payer: COMMERCIAL

## 2024-04-10 ENCOUNTER — TELEPHONE (OUTPATIENT)
Dept: NEUROLOGY | Facility: MEDICAL CENTER | Age: 34
End: 2024-04-10

## 2024-04-10 VITALS
TEMPERATURE: 97.6 F | SYSTOLIC BLOOD PRESSURE: 118 MMHG | WEIGHT: 263.23 LBS | DIASTOLIC BLOOD PRESSURE: 62 MMHG | HEART RATE: 95 BPM | OXYGEN SATURATION: 95 % | HEIGHT: 71 IN | BODY MASS INDEX: 36.85 KG/M2

## 2024-04-10 DIAGNOSIS — R56.9 SEIZURE (HCC): ICD-10-CM

## 2024-04-10 PROCEDURE — 99213 OFFICE O/P EST LOW 20 MIN: CPT | Performed by: PSYCHIATRY & NEUROLOGY

## 2024-04-10 PROCEDURE — 3074F SYST BP LT 130 MM HG: CPT | Performed by: PSYCHIATRY & NEUROLOGY

## 2024-04-10 PROCEDURE — 99211 OFF/OP EST MAY X REQ PHY/QHP: CPT | Performed by: PSYCHIATRY & NEUROLOGY

## 2024-04-10 PROCEDURE — 3078F DIAST BP <80 MM HG: CPT | Performed by: PSYCHIATRY & NEUROLOGY

## 2024-04-10 RX ORDER — LEVETIRACETAM 1000 MG/1
1500 TABLET ORAL 2 TIMES DAILY
Qty: 270 TABLET | Refills: 2 | Status: SHIPPED | OUTPATIENT
Start: 2024-04-10

## 2024-04-10 ASSESSMENT — PATIENT HEALTH QUESTIONNAIRE - PHQ9: CLINICAL INTERPRETATION OF PHQ2 SCORE: 0

## 2024-04-10 ASSESSMENT — FIBROSIS 4 INDEX: FIB4 SCORE: 0.54

## 2024-04-10 NOTE — TELEPHONE ENCOUNTER
Received Refill PA request via [employer]  for levetiracetam (KEPPRA) 1000 MG tablet . (Quantity:270, Day Supply:90)     Insurance: [employer]  Member ID:  [id number]  BIN: [id number]  PCN: [id number]  Group: [id number]     Ran Test claim via [employer] & medication Pays for a $140.45 copay. Will outreach to patient to offer specialty pharmacy services and or release to preferred pharmacy

## 2024-04-10 NOTE — PATIENT INSTRUCTIONS
Please take Keppra 1500 mg every 12 hours.     Please let our office know if you have any changes in your seizure frequency and/characteristics. Otherwise, please keep the diary of your events and bring it with you at the time of your next follow up visit with our office.     Please take vitamin D3 5000 internation units daily.     Please note that the following might precipitate seizures: missed doses of antiseizure medications, being sick with fever, stress, fatigue, sleep deprivation, not eating regularly, not drinking enough water, drinking too much alcohol, stopping alcohol suddenly if you are currently using it on a regular/daily basis, and/or using recreational drugs, among others.    Please note that the following might lead to an injury, potentially a life-threatening injury, in case you have a seizure and/or lose awareness while:   - being in a large body of water by yourself, such as bath, pool, lake, ocean, among others (risk of drowning)   - being on unprotected heights (risk of fall)   - being around and/or operating heavy machinery (risk of injury)   - being around open fire/hot surfaces (risk of burns)   - any other activities/circumstances, in which if you lose awareness, you might injure yourself and/or others.    Please call for help (crisis line and/or 911) in case you have thoughts of harming yourself and/or others.    Please stop driving if you experience any changes/loss of awareness and/or seizures.     ------------------------------------------------------------------------------------------  Instructions for your family/caregivers:  Please call 911 if the patient has a seizure longer than 2-3 minutes, if seizures are back to back without him recovering to his baseline, or he does not start recovering within 5-10 minutes after the seizure stops. During the seizure - please turn him on his side, please make sure his head is protected (for example, you should put a pillow under his head, if  one is available), and please do not put anything in his mouth.   -------------------------------------------------------------------------------------------    It is important that your seizures are well controlled and you have none or have them rarely. In addition to avoiding injury related to breakthrough seizures, frequent seizures increase risk of SUDEP (sudden unexpected death in epilepsy), where a person goes into a seizure and then never wakes up - this is a rare complication of seizure disorder; one of the best available ways to prevent it is to control your seizures well.     Due to the high volume of patients we are trying to help, your physician will not be able to respond by phone or in [employer] to your routine concerns between appointments.  This does not reflect a lack of interest or concern for you or your diagnosis.  Please bring these questions and concerns to your appointment where your physician can answer.  Please relay more pressing concerns to our office, either via [employer], or by phone; if not able to reach us please visit nearby Urgent Care Center or Emergency Department.  If any emergent medical needs, please seek emergent medical help and/or call 911.    Please note that we are not able to fill out paperwork that might be related to your work, utility company, disability, and/or driving, among others, in between the visits.  Please schedule a dedicated appointment to address your paperwork, so we can do that in a timely manner.  This is not due to lack of concern or interest for your disease-related work/administrative problems, but to make sure that we provide the best possible care and to fill out your paperwork in a correct and timely manner.    Thank you for [facility] your neurological [facility] and we look forward to continuing to serve you.

## 2024-04-10 NOTE — PROGRESS NOTES
"[facility] Epilepsy Program  Follow Up Visit      Patient's Name: Rafi Zapata Jr.  YOB: 1990  MRN: [id number]  Date of Service: 04/10/24.    Referring Provider: Alfredito Valdivia M.D.  [address] [place] [address] [place] [address]    Chief Concern: Seizures.    The patient presents for a follow up. The patient presents with his wife and provides verbal consent for her to be present and provide additional history as necessary.      HPI (as obtained at the time of the initial visit and updated as necessary): The patient is a 34 y.o., right-handed male, who initially presented to my epilepsy clinic for evaluation of seizures on 08/28/2023.    The patient had his first known seizure on 08/13/2023, as noted under event type #1. He his second similar event on 08/24/2023, and was started on Keppra after the second event.    He never had isolated staring spells. No history of oral/manual automatisms. No history of gastric uprising. No autonomic phenomena. No myoclonus. No nocturnal seizures as of the time of the initial visit with me.     He has metallic/blood taste after eating chips.     He is on Keppra and has no adverse effects from it, specifically, no mood issues; there was fatigue at the start of Keppra, but this got better.     He has asthma is overall well controlled at this time.    He used to smoke marijuana heavily and stopped on 08/13/2023.     The patient used to smoke high potency marijuana, energy drinks, and was on Abx, and in that context, had an episode of bilateral body numbness that lasted for an hour and did not come back after he stopped drinking energy drinks.     Semiology:  Event type #1: \"Seizures\".  Year/Age of Onset: August 2023.  Initial features: Lightheadedness that started on top of his head and his head felt cold. He at times experiences karlee vu for several hours prior to the event. Smelling a strange smell (smoke-like).   Event features: Stared off, loses " "awareness, and turned his head to the right. This is followed by a bilateral tonic-clonic movements. He has no recollection for the time during the event and is he responsive during the event. No tongue bite. No bladder/bowel incontinence.  Post-ictal features: Feels very confused and sore after the event.   Duration: Around 1 minute.   Frequency: The first event was on 08/13/2023. The next event was on 08/24/2023. The next and the last event was on 09/14/2023.   Precipitating factors: Stress. Heat. Exposure to gun cleaning solvent (this was 3 hours prior to his initial seizure).     Event type #2: \"Spells\".  Year/Age of Onset: August 2023.  Initial features: Sensation of weird smell. Feeling lightheaded.   Event features: No confusion nor loss of awareness. He tries to focus on different things while experiencing above.   Post-ictal features: Back to baseline.  Duration: 1-2 minutes.  Frequency: At times in clusters (up to 5 per night). None since end of September 2023.   Precipitating factors: Not clear.     History of status epilepticus: no  History of physical injury related to seizures: yes  History of surgery related to epilepsy: no  Family Planning: N/A  Current Driving Status: Not driving at this time.   Seizure Clusters: No   Longest Seizure Freedom: Not clear.     Pertinent Ancillary Test Results:    MRI brain studies:   - MRI brain (09/10/2023  at [facility]): \"1.  MRI of the brain without and with contrast within normal limits. 2.  Scattered paranasal sinus disease.\" I reviewed key images with the patient on 09/26/2023.    CT head studies:   - CT head wo contrast (08/14/2023 at [facility]): \"No acute intracranial abnormality. Paranasal sinusitis.\" I reviewed the images on 08/28/2023.     EEG studies:   - Standard EEG - none available for my review.      - 23-hour ambulatory EEG (10/24/2023, Dr. Oshea): This was, within the above noted limitations, a normal ambulatory EEG recording in the awake and " drowsy/sleep state(s):  -No regional slowing or persistent focal asymmetries were seen.  -No epileptiform discharges or other epileptiform phenomena seen.  -No seizures. Clinical correlation is recommended.  -Clinical Events: None.  -Single lead EKG showed occasional PVCs - please correlate clinically.     INTERIM HISTORY (04/10/2024): The patient continues to do well from seizure perspective.  He had no seizures since late 2023.  Again, he had no episodes of strange sensations, loss of awareness, staring spells, no events suspicious for nocturnal seizures.  He is taking Keppra 1500 mg twice daily regularly and has no adverse effects from it.    He is driving with no problems at this time.    His only concern at this time is that his seizures might be precipitated by summer heat.    Current Antiseizure Medications: Keppra 1500 mg BID.     Previously Tried Antiseizure Medications: None.     Review of Systems: No recent fevers. He gained 4 lbs in the interim. The mood is overall stable. No SI/HI.     Risk Factors For Epilepsy/Seizure Disorder: The patient is a product of normal pregnancy and uncomplicated delivery. The early development was normal and the patient started waking, talking, and engaging in social interaction as expected. There were no challenges during school and no reported attendance of special education programs. There is no history of febrile seizures. There is no history of head trauma. There is no history of stroke. There is no history of CNS infections, such as encephalitis and/or meningitis. There is no history of neurosurgical interventions. There is no reported history of staring spells during childhood/school years.    Past Medical History:  Past Medical History:   Diagnosis Date    ASTHMA     Seizure disorder (HCC)      Past Surgical History:  Ear surgery as child. Right leg fracture (not sure if there was a surgical repair).     Social History:  Social History     Tobacco Use    Smoking  status: Every Day     Current packs/day: 1.00     Average packs/day: 1 pack/day for 15.0 years (15.0 ttl pk-yrs)     Types: Cigarettes    Smokeless tobacco: Never   Vaping Use    Vaping Use: Never used   Substance Use Topics    Alcohol use: No    Drug use: Not Currently     Types: Inhaled     Comment: marijuana     Family History:  There is no known family history of seizures/epilepsy.        4/10/2024     1:00 PM 1/3/2024     1:00 PM 8/28/2023     3:00 PM   PHQ-9 Screening   Little interest or pleasure in doing things 0 - not at all 0 - not at all 1 - several days   Feeling down, depressed, or hopeless 0 - not at all 0 - not at all 1 - several days   Trouble falling or staying asleep, or sleeping too much   0 - not at all   Feeling tired or having little energy   1 - several days   Poor appetite or overeating   1 - several days   Feeling bad about yourself - or that you are a failure or have let yourself or your family down   1 - several days   Trouble concentrating on things, such as reading the newspaper or watching television   0 - not at all   Moving or speaking so slowly that other people could have noticed. Or the opposite - being so fidgety or restless that you have been moving around a lot more than usual   0 - not at all   Thoughts that you would be better off dead, or of hurting yourself in some way   0 - not at all   PHQ-2 Total Score 0 0 2   PHQ-9 Total Score   5     [facility] Suicide Reassessment  New or continued thoughts about killing self?: No  Preparing to end life?: No    Allergies:  No Known Allergies    Current Medications:    Current Outpatient Medications:     levetiracetam, 1,500 mg, Oral, BID, Taking    EPINEPHrine (PRIMATENE MIST INH), Inhale., Taking    vitamin D3, 5,000 Units, Oral, DAILY, Taking    Albuterol Sulfate, Inhale  by mouth., PRN    Physical Examination:    Ambulatory Vitals  Encounter Vitals  Temperature: 36.4 °C (97.6 °F)  Temp src: Temporal  Blood Pressure: 118/62  Pulse:  "95  Pulse Oximetry: 95 %  Weight: 119 kg (263 lb 3.7 oz)  Height: 180.3 cm (5' 11\")  BMI (Calculated): 36.71    Neurological Examination:  Mental Status: The patient is alert and oriented to person, place, time, and situation. Speech is fluent, with no aphasia nor dysarthria noted. Affect is normal.    Cranial Nerve Examination:  CN I: Olfaction examination is deferred.  CN II: Visual fields are full to confrontation examination and show no visual field defect.   CN III, IV, VI: Eye movements are normal in all directions. Pupils are reactive to direct and consensual light. There is no relative afferent pupillary defect. There is no nystagmus.  CN V: Facial sensation to light touch is intact throughout.   CN VII: No significant facial muscle or other soft tissue asymmetry.  CN VIII: Hearing intact to rubbing sounds bilaterally.   CN IX, X: Soft palate elevates symmetrically.  CN XI: Symmetrical shoulder shrug exam.  CN XII: Midline tongue protrusion and moves symmetrically to each side.     Motor Examination:  Muscle strength is intact throughout. Muscle tone is normal throughout. No abnormal movements are observed. No pronator drift is noted.     Muscle Stretch Reflexes Examination:  Muscle stretch reflexes are normal throughout and symmetric.    Sensory Examination:  Preserved sensation to light touch in all extremities.     Coordination:  Normal finger to nose testing bilaterally, no postural nor intentional tremor was noted.     Stance/gait:  Normal regular gait with normal arm swings and stride length. Able to perform tandem gait. Romberg sign is absent.     Labs:   - Keppra (09/26/2023): 13   - Keppra (02/12/2024): 16    ASSESSMENT AND PLAN:  1. 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     We will continue Keppra with no changes, since he is tolerating it well. Refills provided today.  - levetiracetam (KEPPRA) 1000 MG tablet; Take 1.5 Tablets by mouth 2 times a day.  Dispense: 270 " Tablet; Refill: 2   - he will continue vitamin D  supplementation as well.     We reviewed the most recent Keppra level.    We will defer repeat MRI brain/EEG at this time, since he is doing well from seizure perspective.    Provided FMLA paperwork at the time of the initial visit.     Filled out DMV paperwork that he should not drive at the time of the initial visit.     Filled DMV paperwork on 01/03/2024 that he is cleared to drive.     Epilepsy counseling provided in writing.    He was advised to follow up with PCP for PVCs.    Patient Instructions   Please take Keppra 1500 mg every 12 hours.     Please let our office know if you have any changes in your seizure frequency and/characteristics. Otherwise, please keep the diary of your events and bring it with you at the time of your next follow up visit with our office.     Please take vitamin D3 5000 internation units daily.     Please note that the following might precipitate seizures: missed doses of antiseizure medications, being sick with fever, stress, fatigue, sleep deprivation, not eating regularly, not drinking enough water, drinking too much alcohol, stopping alcohol suddenly if you are currently using it on a regular/daily basis, and/or using recreational drugs, among others.    Please note that the following might lead to an injury, potentially a life-threatening injury, in case you have a seizure and/or lose awareness while:   - being in a large body of water by yourself, such as bath, pool, lake, ocean, among others (risk of drowning)   - being on unprotected heights (risk of fall)   - being around and/or operating heavy machinery (risk of injury)   - being around open fire/hot surfaces (risk of burns)   - any other activities/circumstances, in which if you lose awareness, you might injure yourself and/or others.    Please call for help (crisis line and/or 911) in case you have thoughts of harming yourself and/or others.    Please stop driving if you  experience any changes/loss of awareness and/or seizures.     ------------------------------------------------------------------------------------------  Instructions for your family/caregivers:  Please call 911 if the patient has a seizure longer than 2-3 minutes, if seizures are back to back without him recovering to his baseline, or he does not start recovering within 5-10 minutes after the seizure stops. During the seizure - please turn him on his side, please make sure his head is protected (for example, you should put a pillow under his head, if one is available), and please do not put anything in his mouth.   -------------------------------------------------------------------------------------------    It is important that your seizures are well controlled and you have none or have them rarely. In addition to avoiding injury related to breakthrough seizures, frequent seizures increase risk of SUDEP (sudden unexpected death in epilepsy), where a person goes into a seizure and then never wakes up - this is a rare complication of seizure disorder; one of the best available ways to prevent it is to control your seizures well.     Due to the high volume of patients we are trying to help, your physician will not be able to respond by phone or in [employer] [place] to your routine concerns between appointments.  This does not reflect a lack of interest or concern for you or your diagnosis.  Please bring these questions and concerns to your appointment where your physician can answer.  Please relay more pressing concerns to our office, either via [employer] [facility], or by phone; if not able to reach us please visit nearby Urgent Care Center or Emergency Department.  If any emergent medical needs, please seek emergent medical help and/or call 911.    Please note that we are not able to fill out paperwork that might be related to your work, utility company, disability, and/or driving, among others, in between the visits.  Please schedule a  dedicated appointment to address your paperwork, so we can do that in a timely manner.  This is not due to lack of concern or interest for your disease-related work/administrative problems, but to make sure that we provide the best possible care and to fill out your paperwork in a correct and timely manner.    Thank you for entrusting your neurological care to [facility] and we look forward to continuing to serve you.    Follow up in 6 months.     Catracho Oshea MD  Outpatient Neurology   [facility]

## 2024-05-15 ENCOUNTER — APPOINTMENT (OUTPATIENT)
Dept: RADIOLOGY | Facility: MEDICAL CENTER | Age: 34
End: 2024-05-15
Attending: STUDENT IN AN ORGANIZED HEALTH CARE EDUCATION/TRAINING PROGRAM
Payer: COMMERCIAL

## 2024-05-15 ENCOUNTER — HOSPITAL ENCOUNTER (EMERGENCY)
Facility: MEDICAL CENTER | Age: 34
End: 2024-05-16
Attending: STUDENT IN AN ORGANIZED HEALTH CARE EDUCATION/TRAINING PROGRAM
Payer: COMMERCIAL

## 2024-05-15 DIAGNOSIS — R56.9 SEIZURE (HCC): ICD-10-CM

## 2024-05-15 LAB
BASOPHILS # BLD AUTO: 1 % (ref 0–1.8)
BASOPHILS # BLD: 0.1 K/UL (ref 0–0.12)
EKG IMPRESSION: NORMAL
EOSINOPHIL # BLD AUTO: 0.22 K/UL (ref 0–0.51)
EOSINOPHIL NFR BLD: 2.1 % (ref 0–6.9)
ERYTHROCYTE [DISTWIDTH] IN BLOOD BY AUTOMATED COUNT: 43.7 FL (ref 35.9–50)
HCT VFR BLD AUTO: 47.9 % (ref 42–52)
HGB BLD-MCNC: 17.1 G/DL (ref 14–18)
IMM GRANULOCYTES # BLD AUTO: 0.04 K/UL (ref 0–0.11)
IMM GRANULOCYTES NFR BLD AUTO: 0.4 % (ref 0–0.9)
LYMPHOCYTES # BLD AUTO: 2.23 K/UL (ref 1–4.8)
LYMPHOCYTES NFR BLD: 21.4 % (ref 22–41)
MCH RBC QN AUTO: 32.9 PG (ref 27–33)
MCHC RBC AUTO-ENTMCNC: 35.7 G/DL (ref 32.3–36.5)
MCV RBC AUTO: 92.3 FL (ref 81.4–97.8)
MONOCYTES # BLD AUTO: 0.83 K/UL (ref 0–0.85)
MONOCYTES NFR BLD AUTO: 8 % (ref 0–13.4)
NEUTROPHILS # BLD AUTO: 7.02 K/UL (ref 1.82–7.42)
NEUTROPHILS NFR BLD: 67.1 % (ref 44–72)
NRBC # BLD AUTO: 0 K/UL
NRBC BLD-RTO: 0 /100 WBC (ref 0–0.2)
PLATELET # BLD AUTO: 234 K/UL (ref 164–446)
PMV BLD AUTO: 10.3 FL (ref 9–12.9)
RBC # BLD AUTO: 5.19 M/UL (ref 4.7–6.1)
WBC # BLD AUTO: 10.4 K/UL (ref 4.8–10.8)

## 2024-05-15 ASSESSMENT — PAIN DESCRIPTION - PAIN TYPE: TYPE: ACUTE PAIN

## 2024-05-15 ASSESSMENT — FIBROSIS 4 INDEX: FIB4 SCORE: 0.54

## 2024-05-16 VITALS
TEMPERATURE: 98 F | SYSTOLIC BLOOD PRESSURE: 106 MMHG | OXYGEN SATURATION: 92 % | HEART RATE: 87 BPM | RESPIRATION RATE: 20 BRPM | DIASTOLIC BLOOD PRESSURE: 64 MMHG | WEIGHT: 260 LBS | HEIGHT: 71 IN | BODY MASS INDEX: 36.4 KG/M2

## 2024-05-16 LAB
ALBUMIN SERPL BCP-MCNC: 4.1 G/DL (ref 3.2–4.9)
ALBUMIN/GLOB SERPL: 1.5 G/DL
ALP SERPL-CCNC: 98 U/L (ref 30–99)
ALT SERPL-CCNC: 34 U/L (ref 2–50)
ANION GAP SERPL CALC-SCNC: 12 MMOL/L (ref 7–16)
AST SERPL-CCNC: 25 U/L (ref 12–45)
BILIRUB SERPL-MCNC: 0.5 MG/DL (ref 0.1–1.5)
BUN SERPL-MCNC: 15 MG/DL (ref 8–22)
CALCIUM ALBUM COR SERPL-MCNC: 9.3 MG/DL (ref 8.5–10.5)
CALCIUM SERPL-MCNC: 9.4 MG/DL (ref 8.5–10.5)
CHLORIDE SERPL-SCNC: 107 MMOL/L (ref 96–112)
CO2 SERPL-SCNC: 19 MMOL/L (ref 20–33)
CREAT SERPL-MCNC: 0.83 MG/DL (ref 0.5–1.4)
GFR SERPLBLD CREATININE-BSD FMLA CKD-EPI: 118 ML/MIN/1.73 M 2
GLOBULIN SER CALC-MCNC: 2.8 G/DL (ref 1.9–3.5)
GLUCOSE SERPL-MCNC: 121 MG/DL (ref 65–99)
POTASSIUM SERPL-SCNC: 4.2 MMOL/L (ref 3.6–5.5)
PROT SERPL-MCNC: 6.9 G/DL (ref 6–8.2)
SODIUM SERPL-SCNC: 138 MMOL/L (ref 135–145)

## 2024-05-16 NOTE — ED TRIAGE NOTES
"Chief Complaint   Patient presents with    Seizure     BIBA from home. Witnessed seizure lasted for 15-20 sec.      Per pt, he's compliant with his Keppra.     AOx4, GCS 15. PIV established en route G20 Right AC, patent and intact.    /58   Pulse (!) 117   Temp 36.5 °C (97.7 °F) (Temporal)   Resp 20   Ht 1.803 m (5' 11\")   Wt 118 kg (260 lb)   SpO2 91%   BMI 36.26 kg/m²     "

## 2024-05-16 NOTE — ED PROVIDER NOTES
CHIEF COMPLAINT  Chief Complaint   Patient presents with    Seizure     BIBA from home. Witnessed seizure lasted for 15-20 sec.        LIMITATION TO HISTORY   Select: None    HPI    Rafi Zapata Jr. is a 34 y.o. male who presents to the Emergency Department patient presented for evaluation of a seizure.  He does have a history of epilepsy is on Keppra currently, he does follow with neurology.  He stated he had 2 separate seizure episodes on last August and a third in September.  He has been compliant with his Keppra therapy.  Has not had any seizures since September.  Today while he was at home he had a witnessed seizure lasting approximately 30 seconds with a described postictal period.  Currently the patient is resting comfortably has no acute complaints.    OUTSIDE HISTORIAN(S):  Select: Significant other at bedside I witnessed a seizure he says that it lasted approximately 30 seconds with a described postictal period.    EXTERNAL RECORDS REVIEWED  Select: Other reviewed the patient's notes from the [facility] epilepsy clinic he is on Keppra for seizures last seizure was in September      PAST MEDICAL HISTORY  Past Medical History:   Diagnosis Date    ASTHMA     Seizure disorder (HCC)      .    SURGICAL HISTORY  History reviewed. No pertinent surgical history.      FAMILY HISTORY  History reviewed. No pertinent family history.       SOCIAL HISTORY  Social History     Socioeconomic History    Marital status: Single     Spouse name: Not on file    Number of children: Not on file    Years of education: Not on file    Highest education level: Not on file   Occupational History    Not on file   Tobacco Use    Smoking status: Every Day     Current packs/day: 1.00     Average packs/day: 1 pack/day for 15.0 years (15.0 ttl pk-yrs)     Types: Cigarettes    Smokeless tobacco: Never   Vaping Use    Vaping status: Never Used   Substance and Sexual Activity    Alcohol use: No    Drug use: Not Currently     Types: Inhaled  "    Comment: marijuana    Sexual activity: Not on file   Other Topics Concern    Not on file   Social History Narrative    Not on file     Social Determinants of Health     Financial Resource Strain: Not on file   Food Insecurity: Not on file   Transportation Needs: Not on file   Physical Activity: Not on file   Stress: Not on file   Social Connections: Not on file   Intimate Partner Violence: Not on file   Housing Stability: Not on file         CURRENT MEDICATIONS  No current facility-administered medications on file prior to encounter.     Current Outpatient Medications on File Prior to Encounter   Medication Sig Dispense Refill    levetiracetam (KEPPRA) 1000 MG tablet Take 1.5 Tablets by mouth 2 times a day. 270 Tablet 2    EPINEPHrine (PRIMATENE MIST INH) Inhale.      Vitamin D3 5000 Unit (125 mcg) Tab Take 1 Tablet by mouth every day. 90 Tablet 3    Albuterol Sulfate 108 (90 Base) MCG/ACT AEROSOL POWDER, BREATH ACTIVATED Inhale  by mouth.             ALLERGIES  No Known Allergies    PHYSICAL EXAM  VITAL SIGNS:/66   Pulse 97   Temp 36.5 °C (97.7 °F) (Temporal)   Resp (!) 23   Ht 1.803 m (5' 11\")   Wt 118 kg (260 lb)   SpO2 91%   BMI 36.26 kg/m²       VITALS - vital signs documented prior to this note have been reviewed and noted,  GENERAL - awake, alert, oriented, GCS 15, no apparent distress, non-toxic  appearing  HEENT - normocephalic, atraumatic, pupils equal, sclera anicteric, mucus  membranes moist  NECK - supple, no meningismus, full active range of motion, trachea midline  CARDIOVASCULAR - regular rate/rhythm, no murmurs/gallops/rubs  PULMONARY - no respiratory distress, speaking in full sentences, clear to  auscultation bilaterally, no wheezing/ronchi/rales, no accessory muscle use  GASTROINTESTINAL - soft, non-tender, non-distended, no rebound, guarding,  or peritonitis  GENITOURINARY - Deferred  NEUROLOGIC - Awake alert, normal mental status, speech fluid, cognition  normal, moves all " extremities cranial nerves II through XII are intact  MUSCULOSKELETAL - no obvious asymmetry or deformities present  EXTREMITIES - warm, well-perfused, no cyanosis or significant edema  DERMATOLOGIC - warm, dry, no rashes, no jaundice  PSYCHIATRIC - normal affect, normal insight, normal concentration    DIAGNOSTIC STUDIES / PROCEDURES  EKG  I have independently interpreted this EKG  Interpreted below by myself as EKG shows a normal sinus rhythm with a normal WA QRS QTc interval there is a normal axis.  There is no ST elevation or depression to suggest ischemia no abnormal T wave inversion.  There is no STEMI pattern.  Interpretation is normal sinus rhythm as interpreted by myself  Rate is 98     Report   Date Value Ref Range Status   05/15/2024       [facility] Emergency Dept[facility]    Test Date:  2024-05-15  Pt Name:    MICKI GONZALEZ                Department: ER  MRN:        [id number]                      Room:       [facility]  Gender:     Male                         Technician: [id number]  :        1990                   Requested By:PRUDENCIO VARGAS  Order #:    [id number]                    Reading MD: Prudencio Vargas    Measurements  Intervals                                Axis  Rate:       98                           P:          34  WA:         174                          QRS:        49  QRSD:       73                           T:          56  QT:         315  QTc:        403    Interpretive Statements  Sinus rhythm  ST elev, probable normal early repol pattern  Compared to ECG 2023 23:57:14  No acute ischemic changes  Electronically Signed On 05- 23:09:57 PDT by Prudencio Vargas                LABS  Labs Reviewed   CBC WITH DIFFERENTIAL - Abnormal; Notable for the following components:       Result Value    Lymphocytes 21.40 (*)     All other components within normal limits   COMP METABOLIC PANEL - Abnormal; Notable for the following components:    Co2 19 (*)     Glucose 121  (*)     All other components within normal limits   ESTIMATED GFR       CBC shows no evidence of anemia CMP without any evidence of electrolyte disturbances  RADIOLOGY  I have independently interpreted the diagnostic imaging associated with this visit and am waiting the final reading from the radiologist.   My preliminary interpretation is as follows: No acute cardiopulmonary process      Radiologist interpretation:   DX-CHEST-PORTABLE (1 VIEW)   Final Result      No acute cardiac or pulmonary abnormalities are identified.           COURSE & MEDICAL DECISION MAKING    ED COURSE:        INTERVENTIONS BY ME:  Medications - No data to display    INITIAL ASSESSMENT, COURSE AND PLAN  Care Narrative: Patient presented for evaluation of a seizure.  Does have a known history of epilepsy is on Keppra currently.  Upon arrival in the emergency department patient is awake alert answer questions appropriate with a normal reassuring neurologic exam.  Did have some borderline hypoxia with O2 saturations in the low 90s as such I did obtain a chest x-ray to evaluate for possible aspiration event fortunately chest x-ray showed no acute cardiopulmonary process.  Labs were obtained did not demonstrate any abnormalities of clinical significance.  He was observed in the emergency department for 2 hours then had a return to his baseline with no further seizure-like activity.  Patient does know not to drive until he is cleared by his neurologist I instructed him to call them tomorrow for further direction.  As well as follow-up.  Return precaution discussed he was discharged in stable condition.             ADDITIONAL PROBLEM LIST  History of seizures  DISPOSITION AND DISCUSSIONS      Decision tools and prescription drugs considered including, but not limited to: Medication modification will defer as the patient has been well-controlled with his epilepsy on Keppra .    FINAL DIAGNOSIS  1. Seizure (HCC)             Electronically signed by:  Prudencio Aguayo DO ,12:22 AM 05/15/24

## 2024-05-16 NOTE — DISCHARGE INSTRUCTIONS
Call and schedule follow-up with your neurologist tomorrow if you develop any new or concerning symptoms return to the ER do not drive until you are cleared by your neurologist

## 2024-05-20 ENCOUNTER — OFFICE VISIT (OUTPATIENT)
Dept: NEUROLOGY | Facility: MEDICAL CENTER | Age: 34
End: 2024-05-20
Attending: PSYCHIATRY & NEUROLOGY
Payer: COMMERCIAL

## 2024-05-20 VITALS
HEIGHT: 71 IN | DIASTOLIC BLOOD PRESSURE: 68 MMHG | SYSTOLIC BLOOD PRESSURE: 110 MMHG | OXYGEN SATURATION: 94 % | BODY MASS INDEX: 36.85 KG/M2 | TEMPERATURE: 98.8 F | WEIGHT: 263.23 LBS | HEART RATE: 100 BPM | RESPIRATION RATE: 20 BRPM

## 2024-05-20 DIAGNOSIS — R56.9 SEIZURE (HCC): ICD-10-CM

## 2024-05-20 PROCEDURE — 3074F SYST BP LT 130 MM HG: CPT | Performed by: PSYCHIATRY & NEUROLOGY

## 2024-05-20 PROCEDURE — 3078F DIAST BP <80 MM HG: CPT | Performed by: PSYCHIATRY & NEUROLOGY

## 2024-05-20 PROCEDURE — 99213 OFFICE O/P EST LOW 20 MIN: CPT | Performed by: PSYCHIATRY & NEUROLOGY

## 2024-05-20 ASSESSMENT — FIBROSIS 4 INDEX: FIB4 SCORE: 0.62

## 2024-05-20 ASSESSMENT — PATIENT HEALTH QUESTIONNAIRE - PHQ9: CLINICAL INTERPRETATION OF PHQ2 SCORE: 0

## 2024-05-20 NOTE — PROGRESS NOTES
"[facility] Epilepsy Program  Follow Up Visit      Patient's Name: Rafi Zapata Jr.  YOB: 1990  MRN: [id number]  Date of Service: 05/20/24.    Referring Provider: Alfredito Valdivia M.D.  [address] [place] [address] [place] [id number]    Chief Concern: Seizures.    The patient presents for a follow up. The patient presents with his wife and provides verbal consent for her to be present and provide additional history as necessary.      HPI (as obtained at the time of the initial visit and updated as necessary): The patient is a 34 y.o., right-handed male, who initially presented to my epilepsy clinic for evaluation of seizures on 08/28/2023.    The patient had his first known seizure on 08/13/2023, as noted under event type #1. He his second similar event on 08/24/2023, and was started on Keppra after the second event.    He never had isolated staring spells. No history of oral/manual automatisms. No history of gastric uprising. No autonomic phenomena. No myoclonus. No nocturnal seizures as of the time of the initial visit with me.     He has metallic/blood taste after eating chips.     He is on Keppra and has no adverse effects from it, specifically, no mood issues; there was fatigue at the start of Keppra, but this got better.     He has asthma is overall well controlled at this time.    He used to smoke marijuana heavily and stopped on 08/13/2023.     The patient used to smoke high potency marijuana, energy drinks, and was on Abx, and in that context, had an episode of bilateral body numbness that lasted for an hour and did not come back after he stopped drinking energy drinks.     Semiology:  Event type #1: \"Seizures\".  Year/Age of Onset: August 2023.  Initial features: Lightheadedness that started on top of his head and his head felt cold. He at times experiences karlee vu for several hours prior to the event. Smelling a strange smell (smoke-like).   Event features: Stared off, loses " "awareness, and turned his head to the right. This is followed by a bilateral tonic-clonic movements. He has no recollection for the time during the event and is he responsive during the event. No tongue bite. No bladder/bowel incontinence.  Post-ictal features: Feels very confused and sore after the event.   Duration: Around 1 minute.   Frequency: The first event was on 08/13/2023. The next event was on 08/24/2023. The next event was on 09/14/2023. The next event was on 05/15/2024.  Precipitating factors: Stress. Heat. Exposure to gun cleaning solvent (this was 3 hours prior to his initial seizure).     Event type #2: \"Spells\".  Year/Age of Onset: August 2023.  Initial features: Sensation of weird smell. Feeling lightheaded.   Event features: No confusion nor loss of awareness. He tries to focus on different things while experiencing above.   Post-ictal features: Back to baseline.  Duration: 1-2 minutes.  Frequency: At times in clusters (up to 5 per night). None since end of September 2023.   Precipitating factors: Not clear.     History of status epilepticus: no  History of physical injury related to seizures: yes  History of surgery related to epilepsy: no  Family Planning: N/A  Current Driving Status: Not driving at this time.   Seizure Clusters: No   Longest Seizure Freedom: Not clear.     Pertinent Ancillary Test Results:    MRI brain studies:   - MRI brain (09/10/2023  at [facility]): \"1.  MRI of the brain without and with contrast within normal limits. 2.  Scattered paranasal sinus disease.\" I reviewed key images with the patient on 09/26/2023.    CT head studies:   - CT head wo contrast (08/14/2023 at [facility]): \"No acute intracranial abnormality. Paranasal sinusitis.\" I reviewed the images on 08/28/2023.     EEG studies:   - Standard EEG - none available for my review.      - 23-hour ambulatory EEG (10/24/2023, Dr. Oshea): This was, within the above noted limitations, a normal ambulatory EEG recording in the " awake and drowsy/sleep state(s):  -No regional slowing or persistent focal asymmetries were seen.  -No epileptiform discharges or other epileptiform phenomena seen.  -No seizures. Clinical correlation is recommended.  -Clinical Events: None.  -Single lead EKG showed occasional PVCs - please correlate clinically.     INTERIM HISTORY (05/20/2024): The patient unfortunately had a breakthrough seizure on 05/15/2024. He had staring spells before full body convulsion. It occurred in context of stress at work, disrupted sleep schedule, not drinking water, nor eating regularly, and disrupted Keppra intake schedule. It was also hot at that time.     No adverse effects to Keppra.     Current Antiseizure Medications: Keppra 1500 mg BID.     Previously Tried Antiseizure Medications: None.     Review of Systems: No recent fevers. His weight is stable at this time. The mood is overall stable. No SI/HI.     Risk Factors For Epilepsy/Seizure Disorder: The patient is a product of normal pregnancy and uncomplicated delivery. The early development was normal and the patient started waking, talking, and engaging in social interaction as expected. There were no challenges during school and no reported attendance of special education programs. There is no history of febrile seizures. There is no history of head trauma. There is no history of stroke. There is no history of CNS infections, such as encephalitis and/or meningitis. There is no history of neurosurgical interventions. There is no reported history of staring spells during childhood/school years.    Past Medical History:  Past Medical History:   Diagnosis Date    ASTHMA     Seizure disorder (HCC)      Past Surgical History:  Ear surgery as child. Right leg fracture (not sure if there was a surgical repair).     Social History:  Social History     Tobacco Use    Smoking status: Every Day     Current packs/day: 1.00     Average packs/day: 1 pack/day for 15.0 years (15.0 ttl pk-yrs)      Types: Cigarettes    Smokeless tobacco: Never   Vaping Use    Vaping status: Never Used   Substance Use Topics    Alcohol use: No    Drug use: Not Currently     Types: Inhaled     Comment: marijuana     Family History:  There is no known family history of seizures/epilepsy.        5/20/2024     4:00 PM 4/10/2024     1:00 PM 1/3/2024     1:00 PM 8/28/2023     3:00 PM   PHQ-9 Screening   Little interest or pleasure in doing things 0 - not at all 0 - not at all 0 - not at all 1 - several days   Feeling down, depressed, or hopeless 0 - not at all 0 - not at all 0 - not at all 1 - several days   Trouble falling or staying asleep, or sleeping too much    0 - not at all   Feeling tired or having little energy    1 - several days   Poor appetite or overeating    1 - several days   Feeling bad about yourself - or that you are a failure or have let yourself or your family down    1 - several days   Trouble concentrating on things, such as reading the newspaper or watching television    0 - not at all   Moving or speaking so slowly that other people could have noticed. Or the opposite - being so fidgety or restless that you have been moving around a lot more than usual    0 - not at all   Thoughts that you would be better off dead, or of hurting yourself in some way    0 - not at all   PHQ-2 Total Score 0 0 0 2   PHQ-9 Total Score    5     [place] Suicide Reassessment  New or continued thoughts about killing self?: No  Preparing to end life?: No    Allergies:  No Known Allergies    Current Medications:    Current Outpatient Medications:     levetiracetam, 1,500 mg, Oral, BID, Taking    EPINEPHrine (PRIMATENE MIST INH), Inhale., PRN    vitamin D3, 5,000 Units, Oral, DAILY, Taking    Albuterol Sulfate, Inhale  by mouth., PRN    Physical Examination:    Ambulatory Vitals  Encounter Vitals  Temperature: 37.1 °C (98.8 °F)  Temp src: Temporal  Blood Pressure: 110/68  Pulse: 100  Respiration: 20  Pulse Oximetry: 94 %  Weight:  "119 kg (263 lb 3.7 oz)  Height: 180.3 cm (5' 11\")  BMI (Calculated): 36.71    Neurological Examination:  Mental Status: The patient is alert and oriented to person, place, time, and situation. Speech is fluent, with no aphasia nor dysarthria noted. Affect is normal.    Cranial Nerve Examination:  CN I: Olfaction examination is deferred.  CN II: Visual fields are full to confrontation examination and show no visual field defect.   CN III, IV, VI: Eye movements are normal in all directions. Pupils are reactive to direct and consensual light. There is no relative afferent pupillary defect. There is no nystagmus.  CN V: Facial sensation to light touch is intact throughout.   CN VII: No significant facial muscle or other soft tissue asymmetry.  CN VIII: Hearing intact to rubbing sounds bilaterally.   CN IX, X: Soft palate elevates symmetrically.  CN XI: Symmetrical shoulder shrug exam.  CN XII: Midline tongue protrusion and moves symmetrically to each side.     Motor Examination:  Muscle strength is intact throughout. Muscle tone is normal throughout. No abnormal movements are observed. No pronator drift is noted.     Muscle Stretch Reflexes Examination:  Muscle stretch reflexes are normal throughout and symmetric.    Sensory Examination:  Preserved sensation to light touch in all extremities.     Coordination:  Normal finger to nose testing bilaterally, no postural nor intentional tremor was noted.     Stance/gait:  Normal regular gait with normal arm swings and stride length. Able to perform tandem gait. Romberg sign is absent.     Labs:   - Keppra (09/26/2023): 13   - Keppra (02/12/2024): 16    ASSESSMENT AND PLAN:  1. 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     The patient had a breakthrough seizure on 5/15/2024 in context of several precipitating factors.  We will continue Keppra with no changes at this time, since the patient's stress which likely led to this breakthrough seizure has " resolved at this time.  The patient does not need refills for Keppra at this time.  - levetiracetam (KEPPRA) 1000 MG tablet; Take 1.5 Tablets by mouth 2 times a day.  Dispense: 270 Tablet; Refill: 2 (no need for refills)   - he will continue vitamin D  supplementation as well.     We will defer repeat MRI brain/EEG at this time, since he is doing well from seizure perspective, and his last breakthrough seizure had clear precipitating factors.    Provided FMLA paperwork at the time of the initial visit and again today.    Filled out DMV paperwork that he should not drive at the time of the initial visit.     Filled DMV paperwork on 01/03/2024 that he is cleared to drive.     He was advised not to drive after his seizure on 05/15/2024.  DMV paperwork filled out that the patient should not drive.    Epilepsy counseling provided in writing.    He was advised to follow up with PCP for PVCs.    Patient Instructions   Please take Keppra 1500 mg every 12 hours.     Please let our office know if you have any changes in your seizure frequency and/characteristics. Otherwise, please keep the diary of your events and bring it with you at the time of your next follow up visit with our office.     Please take vitamin D3 5000 internation units daily.     Please note that the following might precipitate seizures: missed doses of antiseizure medications, being sick with fever, stress, fatigue, sleep deprivation, not eating regularly, not drinking enough water, drinking too much alcohol, stopping alcohol suddenly if you are currently using it on a regular/daily basis, and/or using recreational drugs, among others.    Please note that the following might lead to an injury, potentially a life-threatening injury, in case you have a seizure and/or lose awareness while:   - being in a large body of water by yourself, such as bath, pool, lake, ocean, among others (risk of drowning)   - being on unprotected heights (risk of fall)   - being  around and/or operating heavy machinery (risk of injury)   - being around open fire/hot surfaces (risk of burns)   - any other activities/circumstances, in which if you lose awareness, you might injure yourself and/or others.    Please call for help (crisis line and/or 911) in case you have thoughts of harming yourself and/or others.    Please abstain from driving until further notice.     ------------------------------------------------------------------------------------------  Instructions for your family/caregivers:  Please call 911 if the patient has a seizure longer than 2-3 minutes, if seizures are back to back without him recovering to his baseline, or he does not start recovering within 5-10 minutes after the seizure stops. During the seizure - please turn him on his side, please make sure his head is protected (for example, you should put a pillow under his head, if one is available), and please do not put anything in his mouth.   -------------------------------------------------------------------------------------------    It is important that your seizures are well controlled and you have none or have them rarely. In addition to avoiding injury related to breakthrough seizures, frequent seizures increase risk of SUDEP (sudden unexpected death in epilepsy), where a person goes into a seizure and then never wakes up - this is a rare complication of seizure disorder; one of the best available ways to prevent it is to control your seizures well.     Due to the high volume of patients we are trying to help, your physician will not be able to respond by phone or in MyChart to your routine concerns between appointments.  This does not reflect a lack of interest or concern for you or your diagnosis.  Please bring these questions and concerns to your appointment where your physician can answer.  Please relay more pressing concerns to our office, either via MyChart, or by phone; if not able to reach us please  visit nearby Urgent Care Center or Emergency Department.  If any emergent medical needs, please seek emergent medical help and/or call 911.    Please note that we are not able to fill out paperwork that might be related to your work, utility company, disability, and/or driving, among others, in between the visits.  Please schedule a dedicated appointment to address your paperwork, so we can do that in a timely manner.  This is not due to lack of concern or interest for your disease-related work/administrative problems, but to make sure that we provide the best possible care and to fill out your paperwork in a correct and timely manner.    Thank you for entrusting your neurological care to [facility] and we look forward to continuing to serve you.    Follow up in 3 months.     My total time spent caring for the patient on the day of the encounter was 25 minutes.   This does not include time spent on separately billable procedures/tests.      Catracho Oshea MD  Outpatient Neurology   [facility]

## 2024-05-20 NOTE — PATIENT INSTRUCTIONS
Please take Keppra 1500 mg every 12 hours.     Please let our office know if you have any changes in your seizure frequency and/characteristics. Otherwise, please keep the diary of your events and bring it with you at the time of your next follow up visit with our office.     Please take vitamin D3 5000 internation units daily.     Please note that the following might precipitate seizures: missed doses of antiseizure medications, being sick with fever, stress, fatigue, sleep deprivation, not eating regularly, not drinking enough water, drinking too much alcohol, stopping alcohol suddenly if you are currently using it on a regular/daily basis, and/or using recreational drugs, among others.    Please note that the following might lead to an injury, potentially a life-threatening injury, in case you have a seizure and/or lose awareness while:   - being in a large body of water by yourself, such as bath, pool, lake, ocean, among others (risk of drowning)   - being on unprotected heights (risk of fall)   - being around and/or operating heavy machinery (risk of injury)   - being around open fire/hot surfaces (risk of burns)   - any other activities/circumstances, in which if you lose awareness, you might injure yourself and/or others.    Please call for help (crisis line and/or 911) in case you have thoughts of harming yourself and/or others.    Please abstain from driving until further notice.     ------------------------------------------------------------------------------------------  Instructions for your family/caregivers:  Please call 911 if the patient has a seizure longer than 2-3 minutes, if seizures are back to back without him recovering to his baseline, or he does not start recovering within 5-10 minutes after the seizure stops. During the seizure - please turn him on his side, please make sure his head is protected (for example, you should put a pillow under his head, if one is available), and please do not  put anything in his mouth.   -------------------------------------------------------------------------------------------    It is important that your seizures are well controlled and you have none or have them rarely. In addition to avoiding injury related to breakthrough seizures, frequent seizures increase risk of SUDEP (sudden unexpected death in epilepsy), where a person goes into a seizure and then never wakes up - this is a rare complication of seizure disorder; one of the best available ways to prevent it is to control your seizures well.     Due to the high volume of patients we are trying to help, your physician will not be able to respond by phone or in [employer] to your routine concerns between appointments.  This does not reflect a lack of interest or concern for you or your diagnosis.  Please bring these questions and concerns to your appointment where your physician can answer.  Please relay more pressing concerns to our office, either via [employer], or by phone; if not able to reach us please visit nearby Urgent Care Center or Emergency Department.  If any emergent medical needs, please seek emergent medical help and/or call 911.    Please note that we are not able to fill out paperwork that might be related to your work, utility company, disability, and/or driving, among others, in between the visits.  Please schedule a dedicated appointment to address your paperwork, so we can do that in a timely manner.  This is not due to lack of concern or interest for your disease-related work/administrative problems, but to make sure that we provide the best possible care and to fill out your paperwork in a correct and timely manner.    Thank you for entrusting your neurological care to [facility] and we look forward to continuing to serve you.

## 2024-05-31 ENCOUNTER — TELEPHONE (OUTPATIENT)
Dept: PHARMACY | Facility: MEDICAL CENTER | Age: 34
End: 2024-05-31
Payer: COMMERCIAL

## 2024-05-31 ENCOUNTER — TELEPHONE (OUTPATIENT)
Dept: NEUROLOGY | Facility: MEDICAL CENTER | Age: 34
End: 2024-05-31
Payer: COMMERCIAL

## 2024-05-31 NOTE — TELEPHONE ENCOUNTER
Attempted to contact patient at [phone] to discuss [facility] and services/benefits offered. No answer, left voicemail.    Jenise Birch

## 2024-08-15 ENCOUNTER — OFFICE VISIT (OUTPATIENT)
Dept: MEDICAL GROUP | Facility: MEDICAL CENTER | Age: 34
End: 2024-08-15
Payer: COMMERCIAL

## 2024-08-15 ENCOUNTER — PATIENT MESSAGE (OUTPATIENT)
Dept: MEDICAL GROUP | Facility: MEDICAL CENTER | Age: 34
End: 2024-08-15

## 2024-08-15 VITALS
SYSTOLIC BLOOD PRESSURE: 128 MMHG | WEIGHT: 259 LBS | DIASTOLIC BLOOD PRESSURE: 88 MMHG | OXYGEN SATURATION: 98 % | BODY MASS INDEX: 35.08 KG/M2 | TEMPERATURE: 98 F | HEART RATE: 92 BPM | HEIGHT: 72 IN | RESPIRATION RATE: 20 BRPM

## 2024-08-15 DIAGNOSIS — J34.9 PARANASAL SINUS DISEASE: ICD-10-CM

## 2024-08-15 DIAGNOSIS — Z76.89 ENCOUNTER TO ESTABLISH CARE: ICD-10-CM

## 2024-08-15 DIAGNOSIS — R56.9 SEIZURE (HCC): ICD-10-CM

## 2024-08-15 DIAGNOSIS — I49.3 PVC'S (PREMATURE VENTRICULAR CONTRACTIONS): ICD-10-CM

## 2024-08-15 PROCEDURE — 3074F SYST BP LT 130 MM HG: CPT | Performed by: STUDENT IN AN ORGANIZED HEALTH CARE EDUCATION/TRAINING PROGRAM

## 2024-08-15 PROCEDURE — 3079F DIAST BP 80-89 MM HG: CPT | Performed by: STUDENT IN AN ORGANIZED HEALTH CARE EDUCATION/TRAINING PROGRAM

## 2024-08-15 PROCEDURE — 99214 OFFICE O/P EST MOD 30 MIN: CPT | Performed by: STUDENT IN AN ORGANIZED HEALTH CARE EDUCATION/TRAINING PROGRAM

## 2024-08-15 RX ORDER — FLUTICASONE PROPIONATE 50 MCG
1 SPRAY, SUSPENSION (ML) NASAL 2 TIMES DAILY
Qty: 100 ML | Refills: 3 | Status: SHIPPED | OUTPATIENT
Start: 2024-08-15 | End: 2024-08-15

## 2024-08-15 RX ORDER — FLUTICASONE PROPIONATE 50 MCG
2 SPRAY, SUSPENSION (ML) NASAL DAILY
Qty: 100 ML | Refills: 1 | Status: SHIPPED | OUTPATIENT
Start: 2024-08-15 | End: 2024-08-19 | Stop reason: SDUPTHER

## 2024-08-15 RX ORDER — FLUTICASONE PROPIONATE 50 MCG
1 SPRAY, SUSPENSION (ML) NASAL
Qty: 100 ML | Refills: 3 | Status: SHIPPED | OUTPATIENT
Start: 2024-08-15 | End: 2024-08-15

## 2024-08-15 ASSESSMENT — ENCOUNTER SYMPTOMS
FEVER: 0
VOMITING: 0
HEMOPTYSIS: 0
PALPITATIONS: 0
SHORTNESS OF BREATH: 0
COUGH: 0
DIARRHEA: 0
CHILLS: 0
ABDOMINAL PAIN: 0

## 2024-08-15 ASSESSMENT — FIBROSIS 4 INDEX: FIB4 SCORE: 0.62

## 2024-08-15 NOTE — PROGRESS NOTES
Rafi Zapata Jr. is a 34 y.o. old male  who is here to establish care     Chief Complaint   Patient presents with    New Patient        Problem   Pvc's (Premature Ventricular Contractions)    Per record review from neurology patient had PVCs on EKG during seizure evaluation.  Denies chest pain, palpitations, shortness of breath, orthopnea, pedal edema, history of heart disease.     Paranasal Sinus Disease    MRI 9/2023 shows scattered paranasal sinus disease.  Patient reports occasional sinus congestion during change of season.  Currently asymptomatic.     Seizure ([facility])    History of seizures.  First episode 8/2023.  MRI was normal except paranasal sinus disease, EEG was normal, EKG showed occasional PVCs.  Follows neurology.  Next appointment on 8/19.  Currently on Keppra 2000 twice daily.  Had 2 seizure episodes in the last 6 months, initial episode was during a respiratory infection, second episode described as confusion after he woke up from sleep-no identified triggers.           Review of Systems   Constitutional:  Negative for chills and fever.   Respiratory:  Negative for cough, hemoptysis and shortness of breath.    Cardiovascular:  Negative for chest pain and palpitations.   Gastrointestinal:  Negative for abdominal pain, diarrhea and vomiting.        Patient  has a past medical history of ASTHMA and Seizure disorder (HCC).  Patient  has no past surgical history on file.  Family History   Problem Relation Age of Onset    Diabetes Mother     Diabetes Father     Bladder cancer Sister     Bladder cancer Paternal Aunt     Leukemia Paternal Uncle      Social History     Tobacco Use    Smoking status: Every Day     Current packs/day: 1.00     Average packs/day: 1 pack/day for 15.0 years (15.0 ttl pk-yrs)     Types: Cigarettes    Smokeless tobacco: Never   Vaping Use    Vaping status: Never Used   Substance Use Topics    Alcohol use: No    Drug use: Not Currently     Types: Inhaled, Marijuana      Comment: marijuana     Patient Active Problem List    Diagnosis Date Noted    PVC's (premature ventricular contractions) 08/15/2024    Paranasal sinus disease 08/15/2024    Seizure (HCC) 08/28/2023     Current Outpatient Medications   Medication Sig Dispense Refill    Albuterol Sulfate 108 (90 Base) MCG/ACT AEROSOL POWDER, BREATH ACTIVATED Inhale 2 Puffs 4 times a day as needed (asthma). 30 Each 3    fluticasone (FLONASE) 50 MCG/ACT nasal spray Administer 2 Sprays into affected nostril(S) every day. 100 mL 1    levetiracetam (KEPPRA) 1000 MG tablet Take 2 Tablets by mouth 2 times a day. 120 Tablet 4    Vitamin D3 5000 Unit (125 mcg) Tab Take 1 Tablet by mouth every day. 90 Tablet 3    EPINEPHrine (PRIMATENE MIST INH) Inhale. (Patient not taking: Reported on 8/15/2024)       No current facility-administered medications for this visit.    (including changes today)  Allergies: Patient has no known allergies.    /88   Pulse 92   Temp 36.7 °C (98 °F) (Temporal)   Resp 20   Ht 1.829 m (6')   Wt 117 kg (259 lb)   SpO2 98%      Physical Exam  Constitutional:       Appearance: Normal appearance.   HENT:      Head: Normocephalic and atraumatic.   Cardiovascular:      Rate and Rhythm: Normal rate and regular rhythm.      Pulses: Normal pulses.      Heart sounds: Normal heart sounds. No murmur heard.  Pulmonary:      Effort: Pulmonary effort is normal. No respiratory distress.      Breath sounds: Normal breath sounds. No wheezing.   Abdominal:      Palpations: Abdomen is soft.      Tenderness: There is no abdominal tenderness.   Neurological:      General: No focal deficit present.      Mental Status: He is alert and oriented to person, place, and time.      Cranial Nerves: No cranial nerve deficit.      Sensory: No sensory deficit.      Motor: No weakness.              I reviewed with patient  lab results    Assessment and plan:    Problem List Items Addressed This Visit       Seizure (HCC)     - Continue Keppra  and follow-up with neurology         PVC's (premature ventricular contractions)     - Obtain 14-day Holter monitor to evaluate for PVC burden         Relevant Orders    Cardiac Event Monitor    Paranasal sinus disease     - Flonase 2 sprays in each nostril daily, once symptoms are controlled reduce to 1 spray per each nostril          Other Visit Diagnoses       Encounter to establish care        Relevant Orders    CBC WITH DIFFERENTIAL    Comp Metabolic Panel    Lipid Profile    TSH    VITAMIN D,25 HYDROXY (DEFICIENCY)    HEMOGLOBIN A1C    HIV AG/AB COMBO ASSAY SCREENING    HEP C VIRUS ANTIBODY                Return in about 6 weeks (around 9/26/2024) for Labfollowup.          Please note that this dictation was created using voice recognition software. I have made every reasonable attempt to correct obvious errors, but I expect that there are errors of grammar and possibly content that I did not discover before finalizing the note.

## 2024-08-15 NOTE — ASSESSMENT & PLAN NOTE
- Flonase 2 sprays in each nostril daily, once symptoms are controlled reduce to 1 spray per each nostril

## 2024-08-19 ENCOUNTER — OFFICE VISIT (OUTPATIENT)
Dept: NEUROLOGY | Facility: MEDICAL CENTER | Age: 34
End: 2024-08-19
Attending: PSYCHIATRY & NEUROLOGY
Payer: COMMERCIAL

## 2024-08-19 VITALS
TEMPERATURE: 97.9 F | RESPIRATION RATE: 20 BRPM | DIASTOLIC BLOOD PRESSURE: 82 MMHG | HEART RATE: 99 BPM | SYSTOLIC BLOOD PRESSURE: 124 MMHG | OXYGEN SATURATION: 93 % | HEIGHT: 71 IN | WEIGHT: 262.35 LBS | BODY MASS INDEX: 36.73 KG/M2

## 2024-08-19 DIAGNOSIS — R56.9 SEIZURE (HCC): ICD-10-CM

## 2024-08-19 PROCEDURE — 3079F DIAST BP 80-89 MM HG: CPT | Performed by: PSYCHIATRY & NEUROLOGY

## 2024-08-19 PROCEDURE — 3074F SYST BP LT 130 MM HG: CPT | Performed by: PSYCHIATRY & NEUROLOGY

## 2024-08-19 PROCEDURE — 99211 OFF/OP EST MAY X REQ PHY/QHP: CPT | Performed by: PSYCHIATRY & NEUROLOGY

## 2024-08-19 PROCEDURE — 99214 OFFICE O/P EST MOD 30 MIN: CPT | Performed by: PSYCHIATRY & NEUROLOGY

## 2024-08-19 RX ORDER — FLUTICASONE PROPIONATE 50 MCG
2 SPRAY, SUSPENSION (ML) NASAL DAILY
Qty: 100 ML | Refills: 3 | Status: SHIPPED | OUTPATIENT
Start: 2024-08-19 | End: 2024-08-30 | Stop reason: SDUPTHER

## 2024-08-19 RX ORDER — LEVETIRACETAM 1000 MG/1
2000 TABLET ORAL 2 TIMES DAILY
Qty: 120 TABLET | Refills: 5 | Status: SHIPPED | OUTPATIENT
Start: 2024-08-19

## 2024-08-19 RX ORDER — LACOSAMIDE 50 MG/1
50 TABLET ORAL 2 TIMES DAILY
Qty: 60 TABLET | Refills: 5 | Status: SHIPPED | OUTPATIENT
Start: 2024-08-19 | End: 2025-02-15

## 2024-08-19 ASSESSMENT — PATIENT HEALTH QUESTIONNAIRE - PHQ9: CLINICAL INTERPRETATION OF PHQ2 SCORE: 0

## 2024-08-19 ASSESSMENT — FIBROSIS 4 INDEX: FIB4 SCORE: 0.62

## 2024-08-19 NOTE — PATIENT INSTRUCTIONS
Please take Keppra 2000 mg every 12 hours.     Please take Vimpat 50 mg twice daily.     Please seek emergent help if you experience any lightheadedness or changes in heart rhythm.     Please let our office know if you have any changes in your seizure frequency and/characteristics. Otherwise, please keep the diary of your events and bring it with you at the time of your next follow up visit with our office.     Please take vitamin D3 5000 internation units daily.     Please note that the following might precipitate seizures: missed doses of antiseizure medications, being sick with fever, stress, fatigue, sleep deprivation, not eating regularly, not drinking enough water, drinking too much alcohol, stopping alcohol suddenly if you are currently using it on a regular/daily basis, and/or using recreational drugs, among others.    Please note that the following might lead to an injury, potentially a life-threatening injury, in case you have a seizure and/or lose awareness while:   - being in a large body of water by yourself, such as bath, pool, lake, ocean, among others (risk of drowning)   - being on unprotected heights (risk of fall)   - being around and/or operating heavy machinery (risk of injury)   - being around open fire/hot surfaces (risk of burns)   - any other activities/circumstances, in which if you lose awareness, you might injure yourself and/or others.    Please call for help (crisis line and/or 911) in case you have thoughts of harming yourself and/or others.    Please abstain from driving until further notice.     ------------------------------------------------------------------------------------------  Instructions for your family/caregivers:  Please call 911 if the patient has a seizure longer than 2-3 minutes, if seizures are back to back without him recovering to his baseline, or he does not start recovering within 5-10 minutes after the seizure stops. During the seizure - please turn him on his  side, please make sure his head is protected (for example, you should put a pillow under his head, if one is available), and please do not put anything in his mouth.   -------------------------------------------------------------------------------------------    It is important that your seizures are well controlled and you have none or have them rarely. In addition to avoiding injury related to breakthrough seizures, frequent seizures increase risk of SUDEP (sudden unexpected death in epilepsy), where a person goes into a seizure and then never wakes up - this is a rare complication of seizure disorder; one of the best available ways to prevent it is to control your seizures well.     Due to the high volume of patients we are trying to help, your physician will not be able to respond by phone or in [employer] to your routine concerns between appointments.  This does not reflect a lack of interest or concern for you or your diagnosis.  Please bring these questions and concerns to your appointment where your physician can answer.  Please relay more pressing concerns to our office, either via [employer], or by phone; if not able to reach us please visit nearby Urgent Care Center or Emergency Department.  If any emergent medical needs, please seek emergent medical help and/or call 911.    Please note that we are not able to fill out paperwork that might be related to your work, utility company, disability, and/or driving, among others, in between the visits.  Please schedule a dedicated appointment to address your paperwork, so we can do that in a timely manner.  This is not due to lack of concern or interest for your disease-related work/administrative problems, but to make sure that we provide the best possible care and to fill out your paperwork in a correct and timely manner.    Thank you for entrusting your neurological care to [facility] and we look forward to continuing to serve you.

## 2024-08-19 NOTE — PROGRESS NOTES
"[facility] Epilepsy Program  Follow Up Visit      Patient's Name: Rafi Zapata Jr.  YOB: 1990  MRN: [id number]  Date of Service: 08/19/24.    Referring Provider: Alfredito Valdivia M.D.  [address] [place] [address] [place] [id number]    Chief Concern: Seizures.    The patient presents for a follow up. The patient presents with his wife and provides verbal consent for her to be present and provide additional history as necessary.      HPI (as obtained at the time of the initial visit and updated as necessary): The patient is a 34 y.o., right-handed male, who initially presented to my epilepsy clinic for evaluation of seizures on 08/28/2023.    The patient had his first known seizure on 08/13/2023, as noted under event type #1. He his second similar event on 08/24/2023, and was started on Keppra after the second event.    He never had isolated staring spells. No history of oral/manual automatisms. No history of gastric uprising. No autonomic phenomena. No myoclonus. No nocturnal seizures as of the time of the initial visit with me.     He has metallic/blood taste after eating chips.     He is on Keppra and has no adverse effects from it, specifically, no mood issues; there was fatigue at the start of Keppra, but this got better.     He has asthma is overall well controlled at this time.    He used to smoke marijuana heavily and stopped on 08/13/2023.     The patient used to smoke high potency marijuana, energy drinks, and was on Abx, and in that context, had an episode of bilateral body numbness that lasted for an hour and did not come back after he stopped drinking energy drinks.     Semiology:  Event type #1: \"Seizures\".  Year/Age of Onset: August 2023.  Initial features: Lightheadedness that started on top of his head and his head felt cold. He at times experiences karlee vu for several hours prior to the event. Smelling a strange smell (smoke-like).   Event features: Stared off, loses " "awareness, and turned his head to the right. This is followed by a bilateral tonic-clonic movements. He has no recollection for the time during the event and is he responsive during the event. No tongue bite. No bladder/bowel incontinence.  Post-ictal features: Feels very confused and sore after the event.   Duration: Around 1 minute.   Frequency: The first event was on 08/13/2023. The next event was on 08/24/2023. The next event was on 09/14/2023. The next event was on 05/15/2024 and continued to have seizures through the summer of 2024.   Precipitating factors: Stress. Heat. Exposure to gun cleaning solvent (this was 3 hours prior to his initial seizure).     Event type #2: \"Spells\".  Year/Age of Onset: August 2023.  Initial features: Sensation of weird smell. Feeling lightheaded.   Event features: No confusion nor loss of awareness. He tries to focus on different things while experiencing above.   Post-ictal features: Back to baseline.  Duration: 1-2 minutes.  Frequency: At times in clusters (up to 5 per night). Events during the summer of 2024.   Precipitating factors: Not clear.     History of status epilepticus: no  History of physical injury related to seizures: yes  History of surgery related to epilepsy: no  Family Planning: N/A  Current Driving Status: Not driving at this time.   Seizure Clusters: No   Longest Seizure Freedom: Not clear.     Pertinent Ancillary Test Results:    MRI brain studies:   - MRI brain (09/10/2023  at [facility]): \"1.  MRI of the brain without and with contrast within normal limits. 2.  Scattered paranasal sinus disease.\" I reviewed key images with the patient on 09/26/2023.    CT head studies:   - CT head wo contrast (08/14/2023 at [facility]): \"No acute intracranial abnormality. Paranasal sinusitis.\" I reviewed the images on 08/28/2023.     EEG studies:   - Standard EEG - none available for my review.      - 23-hour ambulatory EEG (10/24/2023, Dr. Oshea): This was, within the above " noted limitations, a normal ambulatory EEG recording in the awake and drowsy/sleep state(s):  -No regional slowing or persistent focal asymmetries were seen.  -No epileptiform discharges or other epileptiform phenomena seen.  -No seizures. Clinical correlation is recommended.  -Clinical Events: None.  -Single lead EKG showed occasional PVCs - please correlate clinically.     INTERIM HISTORY (08/19/2024): The patient unfortunately continues to have seizures.  He had 2 focal aware seizures on 5/22/2024.  He had a focal to bilateral tonic-clonic seizure, out of sleep, on 7/17/2024, which was potentially caused by a cold he had at the time.  His last known event was in 8/10/2024, which was what appeared to be focal impaired awareness seizure, in context of sleep deprivation.  Unfortunately, his work remains very intense, and is possibly causing his breakthrough seizures.    He is taking Keppra regularly, and has no adverse effects to date.    It seems that the patient was inadvertently taking epinephrine however we have more than recommended in May/June 2024.    The patient is following with his primary care provider for his heart monitor, and recent head cold.    Current Antiseizure Medications: Keppra 2000 mg BID.     Previously Tried Antiseizure Medications: None.     Review of Systems: No recent fevers. His weight is stable at this time. The mood is overall stable. No SI/HI.     Risk Factors For Epilepsy/Seizure Disorder: The patient is a product of normal pregnancy and uncomplicated delivery. The early development was normal and the patient started waking, talking, and engaging in social interaction as expected. There were no challenges during school and no reported attendance of special education programs. There is no history of febrile seizures. There is no history of head trauma. There is no history of stroke. There is no history of CNS infections, such as encephalitis and/or meningitis. There is no history of  neurosurgical interventions. There is no reported history of staring spells during childhood/school years.    Past Medical History:  Past Medical History:   Diagnosis Date    ASTHMA     Seizure disorder (HCC)      Past Surgical History:  Ear surgery as child. Right leg fracture (not sure if there was a surgical repair).     Social History:  Social History     Tobacco Use    Smoking status: Every Day     Current packs/day: 1.00     Average packs/day: 1 pack/day for 15.0 years (15.0 ttl pk-yrs)     Types: Cigarettes    Smokeless tobacco: Never   Vaping Use    Vaping status: Never Used   Substance Use Topics    Alcohol use: No    Drug use: Not Currently     Types: Inhaled, Marijuana     Comment: marijuana     Family History:  There is no known family history of seizures/epilepsy.        8/19/2024     4:30 PM 5/20/2024     4:00 PM 4/10/2024     1:00 PM 1/3/2024     1:00 PM 8/28/2023     3:00 PM   PHQ-9 Screening   Little interest or pleasure in doing things 0 - not at all 0 - not at all 0 - not at all 0 - not at all 1 - several days   Feeling down, depressed, or hopeless 0 - not at all 0 - not at all 0 - not at all 0 - not at all 1 - several days   Trouble falling or staying asleep, or sleeping too much     0 - not at all   Feeling tired or having little energy     1 - several days   Poor appetite or overeating     1 - several days   Feeling bad about yourself - or that you are a failure or have let yourself or your family down     1 - several days   Trouble concentrating on things, such as reading the newspaper or watching television     0 - not at all   Moving or speaking so slowly that other people could have noticed. Or the opposite - being so fidgety or restless that you have been moving around a lot more than usual     0 - not at all   Thoughts that you would be better off dead, or of hurting yourself in some way     0 - not at all   PHQ-2 Total Score 0 0 0 0 2   PHQ-9 Total Score     5     [place] Suicide  "Reassessment  New or continued thoughts about killing self?: No  Preparing to end life?: No    Allergies:  No Known Allergies    Current Medications:    Current Outpatient Medications:     fluticasone, 2 Spray, Nasal, DAILY, Taking    Albuterol Sulfate, 2 Puff, Inhalation, 4X/DAY PRN, PRN    levetiracetam, 2,000 mg, Oral, BID, Taking    vitamin D3, 5,000 Units, Oral, DAILY, Taking    EPINEPHrine (PRIMATENE MIST INH), Inhale. (Patient not taking: Reported on 8/15/2024), Not Taking    Physical Examination:    Ambulatory Vitals  Encounter Vitals  Temperature: 36.6 °C (97.9 °F)  Temp src: Temporal  Blood Pressure: 124/82  Pulse: 99  Respiration: 20  Pulse Oximetry: 93 %  Weight: 119 kg (262 lb 5.6 oz)  Height: 180.3 cm (5' 11\")  BMI (Calculated): 36.59    Neurological Examination:  Mental Status: The patient is alert and oriented to person, place, time, and situation. Speech is fluent, with no aphasia nor dysarthria noted. Affect is normal.    Cranial Nerve Examination:  CN I: Olfaction examination is deferred.  CN II: Visual fields are full to confrontation examination and show no visual field defect.   CN III, IV, VI: Eye movements are normal in all directions. Pupils are reactive to direct and consensual light. There is no relative afferent pupillary defect. There is no nystagmus.  CN V: Facial sensation to light touch is intact throughout.   CN VII: No significant facial muscle or other soft tissue asymmetry.  CN VIII: Hearing intact to rubbing sounds bilaterally.   CN IX, X: Soft palate elevates symmetrically.  CN XI: Symmetrical shoulder shrug exam.  CN XII: Midline tongue protrusion and moves symmetrically to each side.     Motor Examination:  Muscle strength is intact throughout. Muscle tone is normal throughout. No abnormal movements are observed. No pronator drift is noted.     Muscle Stretch Reflexes Examination:  Muscle stretch reflexes are normal throughout and symmetric.    Sensory Examination:  Preserved " sensation to light touch in all extremities.     Coordination:  Normal finger to nose testing bilaterally, no postural nor intentional tremor was noted.     Stance/gait:  Normal regular gait with normal arm swings and stride length. Able to perform tandem gait. Romberg sign is absent.     Labs:   - Keppra (09/26/2023): 13   - Keppra (02/12/2024): 16    ASSESSMENT AND PLAN:  1. 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     In context of his ongoing breakthrough seizures, we will obtain MRI brain and ambulatory EEG.  - MR-BRAIN-WITH & W/O; Future  - Referral to Neurodiagnostics (EEG,EP,EMG/NCS/DBS)    We had an extensive discussion about an optimization of his antiseizure medication regimen, and agreed to proceed with adding Vimpat 50 mg twice daily to his Keppra 2000 mg twice daily.  Adverse effects were discussed.  - lacosamide (VIMPAT) 50 MG Tab tablet; Take 1 Tablet by mouth 2 times a day for 180 days.  Dispense: 60 Tablet; Refill: 5  - levetiracetam (KEPPRA) 1000 MG tablet; Take 2 Tablets by mouth 2 times a day.  Dispense: 120 Tablet; Refill: 5    Provided FMLA paperwork at the time of the initial visit and as needed afterwards.     Filled out DMV paperwork that he should not drive at the time of the initial visit. Filled DMV paperwork on 01/03/2024 that he is cleared to drive. He was advised not to drive after his seizure on 05/15/2024.  DMV paperwork filled out that the patient should not drive.    Epilepsy counseling provided in writing.    He was advised to follow up with PCP for PVCs.    Patient Instructions   Please take Keppra 2000 mg every 12 hours.     Please take Vimpat 50 mg twice daily.     Please seek emergent help if you experience any lightheadedness or changes in heart rhythm.     Please let our office know if you have any changes in your seizure frequency and/characteristics. Otherwise, please keep the diary of your events and bring it with you at the time of your  next follow up visit with our office.     Please take vitamin D3 5000 internation units daily.     Please note that the following might precipitate seizures: missed doses of antiseizure medications, being sick with fever, stress, fatigue, sleep deprivation, not eating regularly, not drinking enough water, drinking too much alcohol, stopping alcohol suddenly if you are currently using it on a regular/daily basis, and/or using recreational drugs, among others.    Please note that the following might lead to an injury, potentially a life-threatening injury, in case you have a seizure and/or lose awareness while:   - being in a large body of water by yourself, such as bath, pool, lake, ocean, among others (risk of drowning)   - being on unprotected heights (risk of fall)   - being around and/or operating heavy machinery (risk of injury)   - being around open fire/hot surfaces (risk of burns)   - any other activities/circumstances, in which if you lose awareness, you might injure yourself and/or others.    Please call for help (crisis line and/or 911) in case you have thoughts of harming yourself and/or others.    Please abstain from driving until further notice.     ------------------------------------------------------------------------------------------  Instructions for your family/caregivers:  Please call 911 if the patient has a seizure longer than 2-3 minutes, if seizures are back to back without him recovering to his baseline, or he does not start recovering within 5-10 minutes after the seizure stops. During the seizure - please turn him on his side, please make sure his head is protected (for example, you should put a pillow under his head, if one is available), and please do not put anything in his mouth.   -------------------------------------------------------------------------------------------    It is important that your seizures are well controlled and you have none or have them rarely. In addition to  avoiding injury related to breakthrough seizures, frequent seizures increase risk of SUDEP (sudden unexpected death in epilepsy), where a person goes into a seizure and then never wakes up - this is a rare complication of seizure disorder; one of the best available ways to prevent it is to control your seizures well.     Due to the high volume of patients we are trying to help, your physician will not be able to respond by phone or in [employer] to your routine concerns between appointments.  This does not reflect a lack of interest or concern for you or your diagnosis.  Please bring these questions and concerns to your appointment where your physician can answer.  Please relay more pressing concerns to our office, either via MyChart, or by phone; if not able to reach us please visit nearby Urgent Care Center or Emergency Department.  If any emergent medical needs, please seek emergent medical help and/or call 911.    Please note that we are not able to fill out paperwork that might be related to your work, utility company, disability, and/or driving, among others, in between the visits.  Please schedule a dedicated appointment to address your paperwork, so we can do that in a timely manner.  This is not due to lack of concern or interest for your disease-related work/administrative problems, but to make sure that we provide the best possible care and to fill out your paperwork in a correct and timely manner.    Thank you for entrusting your neurological care to [facility] and we look forward to continuing to serve you.    Follow up in 2 months.     Catracho Oshea MD  Outpatient Neurology   [facility]

## 2024-08-20 ENCOUNTER — TELEPHONE (OUTPATIENT)
Dept: NEUROLOGY | Facility: MEDICAL CENTER | Age: 34
End: 2024-08-20
Payer: COMMERCIAL

## 2024-08-20 ENCOUNTER — TELEPHONE (OUTPATIENT)
Dept: HEALTH INFORMATION MANAGEMENT | Facility: OTHER | Age: 34
End: 2024-08-20

## 2024-08-20 NOTE — TELEPHONE ENCOUNTER
Prior Authorization for Levetiracetam 1000mg (Quantity: 120, Days: 30) has been submitted via Cover My Meds: Key ([id number])    Insurance: [place]   BIN [id number]  [facility]  ID [id number]  GROUP [id number]    Will follow up in 24-48 business hours.

## 2024-08-22 NOTE — TELEPHONE ENCOUNTER
Prior Authorization for Levetiracetam 1000mg  has been approved for a quantity of 120 , day supply 30    Prior Authorization reference number: [id number]    Insurance: [place]   BIN [id number]  [employer]   ID [id number]  GROUP [id number]  Effective dates: 8/22/2024-8/20/2025  Copay: $55     Is patient eligible to fill with [facility]? Yes    Next Steps:  prescription will be released to  [facility]  [address] [place] [address]

## 2024-08-27 ENCOUNTER — NON-PROVIDER VISIT (OUTPATIENT)
Dept: CARDIOLOGY | Facility: MEDICAL CENTER | Age: 34
End: 2024-08-27
Attending: STUDENT IN AN ORGANIZED HEALTH CARE EDUCATION/TRAINING PROGRAM
Payer: COMMERCIAL

## 2024-08-27 DIAGNOSIS — I49.3 PVC'S (PREMATURE VENTRICULAR CONTRACTIONS): ICD-10-CM

## 2024-08-27 NOTE — PROGRESS NOTES
Patient enrolled in the 14 day [facility] Holter monitoring program per Mara Hendricks MD.  >Monitor mailed to patient by [email or web address].  >Currently pending EOS.

## 2024-08-30 RX ORDER — FLUTICASONE PROPIONATE 50 MCG
2 SPRAY, SUSPENSION (ML) NASAL DAILY
Qty: 16 G | Refills: 3 | Status: SHIPPED | OUTPATIENT
Start: 2024-08-30

## 2024-09-23 ENCOUNTER — TELEPHONE (OUTPATIENT)
Dept: CARDIOLOGY | Facility: MEDICAL CENTER | Age: 34
End: 2024-09-23
Payer: COMMERCIAL

## 2024-09-23 NOTE — TELEPHONE ENCOUNTER
Omar EOS to VR for review on 9/24/24 as ADD    Preliminary findings:    Sinus rhythm  with an avg rate of 95 bpm  Slight P wave morphology change noted  Wenckebach present    Rare supraventricular ectopy, no triplets noted    Rare isolated ventricular ectopics    9 recorded patient events:  -126

## 2024-10-14 ENCOUNTER — OFFICE VISIT (OUTPATIENT)
Dept: NEUROLOGY | Facility: MEDICAL CENTER | Age: 34
End: 2024-10-14
Attending: PSYCHIATRY & NEUROLOGY
Payer: COMMERCIAL

## 2024-10-14 VITALS
SYSTOLIC BLOOD PRESSURE: 124 MMHG | OXYGEN SATURATION: 94 % | BODY MASS INDEX: 36.67 KG/M2 | HEART RATE: 94 BPM | HEIGHT: 71 IN | RESPIRATION RATE: 18 BRPM | DIASTOLIC BLOOD PRESSURE: 78 MMHG | WEIGHT: 261.91 LBS | TEMPERATURE: 98.1 F

## 2024-10-14 DIAGNOSIS — R56.9 SEIZURE (HCC): ICD-10-CM

## 2024-10-14 PROCEDURE — 3078F DIAST BP <80 MM HG: CPT | Performed by: PSYCHIATRY & NEUROLOGY

## 2024-10-14 PROCEDURE — 99213 OFFICE O/P EST LOW 20 MIN: CPT

## 2024-10-14 PROCEDURE — 3074F SYST BP LT 130 MM HG: CPT | Performed by: PSYCHIATRY & NEUROLOGY

## 2024-10-14 PROCEDURE — 99213 OFFICE O/P EST LOW 20 MIN: CPT | Performed by: PSYCHIATRY & NEUROLOGY

## 2024-10-14 RX ORDER — LEVETIRACETAM 500 MG/1
2000 TABLET ORAL 2 TIMES DAILY
Qty: 240 TABLET | Refills: 5 | Status: SHIPPED | OUTPATIENT
Start: 2024-10-14

## 2024-10-14 ASSESSMENT — FIBROSIS 4 INDEX: FIB4 SCORE: 0.62

## 2024-10-14 ASSESSMENT — PATIENT HEALTH QUESTIONNAIRE - PHQ9: CLINICAL INTERPRETATION OF PHQ2 SCORE: 0

## 2024-10-15 ENCOUNTER — TELEPHONE (OUTPATIENT)
Dept: NEUROLOGY | Facility: MEDICAL CENTER | Age: 34
End: 2024-10-15

## 2024-10-15 ENCOUNTER — OFFICE VISIT (OUTPATIENT)
Dept: MEDICAL GROUP | Facility: MEDICAL CENTER | Age: 34
End: 2024-10-15
Payer: COMMERCIAL

## 2024-10-15 VITALS
SYSTOLIC BLOOD PRESSURE: 108 MMHG | BODY MASS INDEX: 35.53 KG/M2 | DIASTOLIC BLOOD PRESSURE: 62 MMHG | OXYGEN SATURATION: 93 % | TEMPERATURE: 98.2 F | HEIGHT: 72 IN | WEIGHT: 262.35 LBS | HEART RATE: 101 BPM

## 2024-10-15 DIAGNOSIS — R56.9 SEIZURE (HCC): ICD-10-CM

## 2024-10-15 DIAGNOSIS — I44.1 SECOND DEGREE AV BLOCK: ICD-10-CM

## 2024-10-15 PROCEDURE — 3078F DIAST BP <80 MM HG: CPT | Performed by: STUDENT IN AN ORGANIZED HEALTH CARE EDUCATION/TRAINING PROGRAM

## 2024-10-15 PROCEDURE — 99214 OFFICE O/P EST MOD 30 MIN: CPT | Performed by: STUDENT IN AN ORGANIZED HEALTH CARE EDUCATION/TRAINING PROGRAM

## 2024-10-15 PROCEDURE — 3074F SYST BP LT 130 MM HG: CPT | Performed by: STUDENT IN AN ORGANIZED HEALTH CARE EDUCATION/TRAINING PROGRAM

## 2024-10-15 ASSESSMENT — FIBROSIS 4 INDEX: FIB4 SCORE: 0.62

## 2024-10-22 ENCOUNTER — APPOINTMENT (OUTPATIENT)
Dept: RADIOLOGY | Facility: MEDICAL CENTER | Age: 34
End: 2024-10-22
Attending: PSYCHIATRY & NEUROLOGY
Payer: COMMERCIAL

## 2024-10-23 ENCOUNTER — TELEPHONE (OUTPATIENT)
Dept: NEUROLOGY | Facility: MEDICAL CENTER | Age: 34
End: 2024-10-23
Payer: COMMERCIAL

## 2024-10-24 ENCOUNTER — APPOINTMENT (OUTPATIENT)
Dept: RADIOLOGY | Facility: MEDICAL CENTER | Age: 34
End: 2024-10-24
Attending: PSYCHIATRY & NEUROLOGY
Payer: COMMERCIAL

## 2024-10-24 DIAGNOSIS — R56.9 SEIZURE (HCC): ICD-10-CM

## 2024-10-24 PROCEDURE — A9579 GAD-BASE MR CONTRAST NOS,1ML: HCPCS | Mod: JZ | Performed by: PSYCHIATRY & NEUROLOGY

## 2024-10-24 PROCEDURE — 70553 MRI BRAIN STEM W/O & W/DYE: CPT

## 2024-10-24 PROCEDURE — 700117 HCHG RX CONTRAST REV CODE 255: Mod: JZ | Performed by: PSYCHIATRY & NEUROLOGY

## 2024-10-24 RX ADMIN — GADOTERIDOL 20 ML: 279.3 INJECTION, SOLUTION INTRAVENOUS at 15:38

## 2024-10-29 ENCOUNTER — NON-PROVIDER VISIT (OUTPATIENT)
Dept: NEUROLOGY | Facility: MEDICAL CENTER | Age: 34
End: 2024-10-29
Attending: PSYCHIATRY & NEUROLOGY
Payer: COMMERCIAL

## 2024-10-29 DIAGNOSIS — R56.9 SEIZURE (HCC): ICD-10-CM

## 2024-10-29 PROCEDURE — 95700 EEG CONT REC W/VID EEG TECH: CPT | Performed by: PSYCHIATRY & NEUROLOGY

## 2024-10-29 PROCEDURE — 95708 EEG WO VID EA 12-26HR UNMNTR: CPT | Performed by: PSYCHIATRY & NEUROLOGY

## 2024-10-29 PROCEDURE — 95719 EEG PHYS/QHP EA INCR W/O VID: CPT | Performed by: PSYCHIATRY & NEUROLOGY

## 2024-10-29 NOTE — PROCEDURES
(No note.)     Intermittent Photic stimulation was performed in a stepwise fashion from 1 to 30 Hz and did not elicit any additional abnormalities on EEG.      EKG: Sampling of the EKG recording did not demonstrate any abnormalities     EVENTS:  Push button events and/or ambulatory diary events: No events reported on the event diary. The patient shared that there were several accidental event button push events.      INTERPRETATION:  This was an abnormal ambulatory EEG recording in the awake and drowsy/sleep state(s):  The presence of occasional to frequent, left temporal, maximal over the F7 contact, epileptiform discharges, points toward increased propensity toward focal seizures arising from that region.   No definitive seizures.   Clinical Events: None.     Catracho Oshea MD  Neurology Attending, Epilepsy Program  [facility]

## 2024-10-30 ENCOUNTER — NON-PROVIDER VISIT (OUTPATIENT)
Dept: NEUROLOGY | Facility: MEDICAL CENTER | Age: 34
End: 2024-10-30
Attending: PSYCHIATRY & NEUROLOGY
Payer: COMMERCIAL

## 2024-11-13 ENCOUNTER — OFFICE VISIT (OUTPATIENT)
Dept: NEUROLOGY | Facility: MEDICAL CENTER | Age: 34
End: 2024-11-13
Attending: PSYCHIATRY & NEUROLOGY
Payer: COMMERCIAL

## 2024-11-13 VITALS
HEART RATE: 89 BPM | RESPIRATION RATE: 16 BRPM | TEMPERATURE: 97.4 F | WEIGHT: 261 LBS | SYSTOLIC BLOOD PRESSURE: 118 MMHG | BODY MASS INDEX: 35.35 KG/M2 | DIASTOLIC BLOOD PRESSURE: 66 MMHG | HEIGHT: 72 IN | OXYGEN SATURATION: 98 %

## 2024-11-13 DIAGNOSIS — R56.9 SEIZURE (HCC): ICD-10-CM

## 2024-11-13 DIAGNOSIS — G37.9 DEMYELINATING LESION (HCC): ICD-10-CM

## 2024-11-13 PROCEDURE — 3074F SYST BP LT 130 MM HG: CPT | Performed by: PSYCHIATRY & NEUROLOGY

## 2024-11-13 PROCEDURE — 99215 OFFICE O/P EST HI 40 MIN: CPT | Performed by: PSYCHIATRY & NEUROLOGY

## 2024-11-13 PROCEDURE — 99211 OFF/OP EST MAY X REQ PHY/QHP: CPT | Performed by: PSYCHIATRY & NEUROLOGY

## 2024-11-13 PROCEDURE — 3078F DIAST BP <80 MM HG: CPT | Performed by: PSYCHIATRY & NEUROLOGY

## 2024-11-13 RX ORDER — LEVETIRACETAM 1000 MG/1
2000 TABLET ORAL 2 TIMES DAILY
Qty: 120 TABLET | Refills: 5 | Status: SHIPPED | OUTPATIENT
Start: 2024-11-13

## 2024-11-13 RX ORDER — LACOSAMIDE 50 MG/1
50 TABLET ORAL 2 TIMES DAILY
Qty: 60 TABLET | Refills: 5 | Status: SHIPPED | OUTPATIENT
Start: 2024-11-13 | End: 2025-05-12

## 2024-11-13 ASSESSMENT — FIBROSIS 4 INDEX: FIB4 SCORE: 0.62

## 2024-11-13 ASSESSMENT — PATIENT HEALTH QUESTIONNAIRE - PHQ9: CLINICAL INTERPRETATION OF PHQ2 SCORE: 0

## 2024-11-13 NOTE — PROGRESS NOTES
"[facility] Epilepsy Program  Follow Up Visit      Patient's Name: Rafi Zapata Jr.  YOB: 1990  MRN: [id number]  Date of Service: 11/13/24.    Referring Provider: Alfredito Valdivia M.D.  [address] [place] [address] [place] [address]    Chief Concern: Seizures.    The patient presents for a follow up. The patient presents with his daughter and provides verbal consent for her to be present and provide additional history as necessary.      HPI (as obtained at the time of the initial visit and updated as necessary): The patient is a 34 y.o., right-handed male, who initially presented to my epilepsy clinic for evaluation of seizures on 08/28/2023.    The patient had his first known seizure on 08/13/2023, as noted under event type #1. He his second similar event on 08/24/2023, and was started on Keppra after the second event.    He never had isolated staring spells. No history of oral/manual automatisms. No history of gastric uprising. No autonomic phenomena. No myoclonus. No nocturnal seizures as of the time of the initial visit with me.     He has metallic/blood taste after eating chips.     He is on Keppra and has no adverse effects from it, specifically, no mood issues; there was fatigue at the start of Keppra, but this got better.     He has asthma is overall well controlled at this time.    He used to smoke marijuana heavily and stopped on 08/13/2023.     The patient used to smoke high potency marijuana, energy drinks, and was on Abx, and in that context, had an episode of bilateral body numbness that lasted for an hour and did not come back after he stopped drinking energy drinks.     Semiology:  Event type #1: \"Seizures\".  Year/Age of Onset: August 2023.  Initial features: Lightheadedness that started on top of his head and his head felt cold. He at times experiences karlee vu for several hours prior to the event. Smelling a strange smell (smoke-like).   Event features: Stared off, " "loses awareness, and turned his head to the right. This is followed by a bilateral tonic-clonic movements. He has no recollection for the time during the event and is he responsive during the event. No tongue bite. No bladder/bowel incontinence.  Post-ictal features: Feels very confused and sore after the event.   Duration: Around 1 minute.   Frequency: The first event was on 08/13/2023. The next event was on 08/24/2023. The next event was on 09/14/2023. The next event was on 05/15/2024 and continued to have seizures through the summer of 2024. The last event was on 08/10/2024.   Precipitating factors: Stress. Heat. Exposure to gun cleaning solvent (this was 3 hours prior to his initial seizure).     Event type #2: \"Spells\".  Year/Age of Onset: August 2023.  Initial features: Sensation of weird smell. Feeling lightheaded.   Event features: No confusion nor loss of awareness. He tries to focus on different things while experiencing above.   Post-ictal features: Back to baseline.  Duration: 1-2 minutes.  Frequency: At times in clusters (up to 5 per night). Events during the summer of 2024. None since July 2024.    Precipitating factors: Not clear.     History of status epilepticus: no  History of physical injury related to seizures: yes  History of surgery related to epilepsy: no  Family Planning: N/A  Current Driving Status: Not driving at this time.   Seizure Clusters: No   Longest Seizure Freedom: Seizure free since 08/10/2024    Pertinent Ancillary Test Results:    MRI brain studies:   - MRI brain (09/10/2023  at [facility]): \"1.  MRI of the brain without and with contrast within normal limits. 2.  Scattered paranasal sinus disease.\" I reviewed key images with the patient on 09/26/2023.     - MRI brain w/wo contrast (10/26/2024, [facility]): \"IMPRESSION: 1.  There are small periventricular T2 hyperintensities noted adjacent to the atrium of the right lateral ventricle suspicious for chronic demyelinating areas. These " "findings are new since the previous study. There are no enhancing lesions. There are no posterior fossa lesions. These findings does not fulfill the Powell MR criteria for diagnosing demyelinating plaques of multiple sclerosis. Please correlate with clinical and other laboratory evidence. 2.  In view of history of seizure, there is no MR evidence of cortical dysplasia,  neuronal migrational abnormality or hippocampal sclerosis.\" I reviewed images with the patient on 11/13/2024.     CT head studies:   - CT head wo contrast (08/14/2023 at [facility]): \"No acute intracranial abnormality. Paranasal sinusitis.\" I reviewed the images on 08/28/2023.     EEG studies:   - Standard EEG - none available for my review.      - 23-hour ambulatory EEG (10/24/2023, Dr. Oshea): This was, within the above noted limitations, a normal ambulatory EEG recording in the awake and drowsy/sleep state(s):  -No regional slowing or persistent focal asymmetries were seen.  -No epileptiform discharges or other epileptiform phenomena seen.  -No seizures. Clinical correlation is recommended.  -Clinical Events: None.  -Single lead EKG showed occasional PVCs - please correlate clinically.      - 1-day ambulatory EEG (10/29/2024, Dr. Oshea): This was an abnormal ambulatory EEG recording in the awake and drowsy/sleep state(s):  The presence of occasional to frequent, left temporal, maximal over the F7 contact, epileptiform discharges, points toward increased propensity toward focal seizures arising from that region.   No definitive seizures.   Clinical Events: None.        INTERIM HISTORY (11/13/2024): The patient has been seizure-free since 8/10/2024.  No events suspicious for seizures since then.  He is taking his Keppra and Vimpat regularly, and has no adverse effects from these.    The patient never had any symptoms suspicious for demyelinating disease.  Specifically, no episodes of vision changes, balance problems, numbness/weakness, bladder " or bowel incontinence, nor any other episodes in the past that would be suspicious for clinically isolated syndrome.  The patient is understandably concerned about a new lesion noted on MRI brain.    Current Antiseizure Medications: Keppra 2000 mg BID. Vimpat 50 mg BID.     Previously Tried Antiseizure Medications: None.     Review of Systems: No recent fevers. His weight is stable. The mood is overall stable. No SI/HI.     Risk Factors For Epilepsy/Seizure Disorder: The patient is a product of normal pregnancy and uncomplicated delivery. The early development was normal and the patient started waking, talking, and engaging in social interaction as expected. There were no challenges during school and no reported attendance of special education programs. There is no history of febrile seizures. There is no history of head trauma. There is no history of stroke. There is no history of CNS infections, such as encephalitis and/or meningitis. There is no history of neurosurgical interventions. There is no reported history of staring spells during childhood/school years.    Past Medical History:  Past Medical History:   Diagnosis Date    ASTHMA     Seizure disorder (HCC)      Past Surgical History:  Ear surgery as child. Right leg fracture (not sure if there was a surgical repair).     Social History:  Social History     Tobacco Use    Smoking status: Every Day     Current packs/day: 1.00     Average packs/day: 1 pack/day for 15.0 years (15.0 ttl pk-yrs)     Types: Cigarettes    Smokeless tobacco: Never   Vaping Use    Vaping status: Never Used   Substance Use Topics    Alcohol use: No    Drug use: Not Currently     Types: Inhaled, Marijuana     Comment: marijuana     Family History:  There is no known family history of seizures/epilepsy.        11/13/2024     3:30 PM 10/14/2024    11:30 AM 8/19/2024     4:30 PM 5/20/2024     4:00 PM 4/10/2024     1:00 PM   PHQ-9 Screening   Little interest or pleasure in doing things 0 -  not at all 0 - not at all 0 - not at all 0 - not at all 0 - not at all   Feeling down, depressed, or hopeless 0 - not at all 0 - not at all 0 - not at all 0 - not at all 0 - not at all   PHQ-2 Total Score 0 0 0 0 0     [place] Suicide Reassessment  New or continued thoughts about killing self?: No  Preparing to end life?: No    Allergies:  No Known Allergies    Current Medications:    Current Outpatient Medications:     levETIRAcetam, 2,000 mg, Oral, BID, Taking    fluticasone, 2 Spray, Nasal, DAILY, Taking    Albuterol Sulfate, 2 Puff, Inhalation, 4X/DAY PRN, PRN    lacosamide, 50 mg, Oral, BID, Taking    vitamin D3, 5,000 Units, Oral, DAILY, Taking    EPINEPHrine (PRIMATENE MIST INH), Inhale. (Patient not taking: Reported on 8/15/2024)    Physical Examination:    Ambulatory Vitals  Encounter Vitals  Temperature: 36.3 °C (97.4 °F)  Temp src: Temporal  Blood Pressure: 118/66  Pulse: 89  Respiration: 16  Pulse Oximetry: 98 %  Weight: 118 kg (261 lb)  Height: 182.9 cm (6')  BMI (Calculated): 35.4    Neurological Examination:  Mental Status: The patient is alert and oriented to person, place, time, and situation. Speech is fluent, with no aphasia nor dysarthria noted. Affect is normal.    Cranial Nerve Examination:  CN I: Olfaction examination is deferred.  CN II: Visual fields are full to confrontation examination and show no visual field defect.   CN III, IV, VI: Eye movements are normal in all directions. Pupils are reactive to direct and consensual light. There is no relative afferent pupillary defect. There is no nystagmus.  CN V: Facial sensation to light touch is intact throughout.   CN VII: No significant facial muscle or other soft tissue asymmetry.  CN VIII: Hearing intact to rubbing sounds bilaterally.   CN IX, X: Soft palate elevates symmetrically.  CN XI: Symmetrical shoulder shrug exam.  CN XII: Midline tongue protrusion and moves symmetrically to each side.     Motor Examination:  Muscle strength is  intact throughout. Muscle tone is normal throughout. No abnormal movements are observed. No pronator drift is noted.     Muscle Stretch Reflexes Examination:  Muscle stretch reflexes are normal throughout and symmetric.    Sensory Examination:  Preserved sensation to light touch in all extremities.     Coordination:  Normal finger to nose testing bilaterally, no postural nor intentional tremor was noted.     Stance/gait:  Normal regular gait with normal arm swings and stride length. Able to perform tandem gait. Romberg sign is absent.     Labs:   - Keppra (09/26/2023): 13   - Keppra (02/12/2024): 16    ASSESSMENT AND PLAN:  1. 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  No clear changes on the MRI brain done in October 2024, which would point toward epileptogenic focus.  Ambulatory EEG done in October 2024 showed occasional to frequent left anterior temporal discharges.  In that context, the patient has a diagnosis consistent with focal epilepsy, lateralizing to the left, localizing to the left, most likely mesial, temporal lobe.    We reviewed in detail the results and images of the most recent MRI brain, as well as ambulatory EEG.    The patient remains seizure-free on current antiseizure medication regimen.  Adverse effects were briefly discussed.  Refills were provided.  - lacosamide (VIMPAT) 50 MG Tab tablet; Take 1 Tablet by mouth 2 times a day for 180 days.  Dispense: 60 Tablet; Refill: 5  - levETIRAcetam (KEPPRA) 1000 MG tablet; Take 2 Tablets by mouth 2 times a day.  Dispense: 120 Tablet; Refill: 5    Since the patient has been seizure-free for more than 3 months now, he was cleared to drive.  DMV paperwork provided today.    Epilepsy counseling provided in writing.    2. Demyelinating lesion (HCC)  There are small periventricular T2 hyperintensities noted adjacent to the atrium of the right lateral ventricle suspicious for chronic demyelinating areas. This is a new change noted  on MRI brain in October 2024, when compared to MRI brain done in September 2023.  The patient never had any clinical symptoms consistent with MS.   - we we had a detailed discussion about the above-noted change in MRI brain.  This finding might represent radiologically isolated syndrome.  We agreed to proceed with serial MRI brain images, with the next MRI brain in about 6 months.  We will consider lumbar puncture in the future if necessary. We will also consider MRI c-spine and t-spine at the time of the next visit.     He was advised to follow up with PCP for PVCs.    Patient Instructions   Please take Keppra 2000 mg every 12 hours.     Please take Vimpat 50 mg twice daily.     Please seek emergent help if you experience any lightheadedness or changes in heart rhythm.     Please let our office know if you have any changes in your seizure frequency and/characteristics. Otherwise, please keep the diary of your events and bring it with you at the time of your next follow up visit with our office.     Please take vitamin D3 5000 internation units daily.     Please note that the following might precipitate seizures: missed doses of antiseizure medications, being sick with fever, stress, fatigue, sleep deprivation, not eating regularly, not drinking enough water, drinking too much alcohol, stopping alcohol suddenly if you are currently using it on a regular/daily basis, and/or using recreational drugs, among others.    Please note that the following might lead to an injury, potentially a life-threatening injury, in case you have a seizure and/or lose awareness while:   - being in a large body of water by yourself, such as bath, pool, lake, ocean, among others (risk of drowning)   - being on unprotected heights (risk of fall)   - being around and/or operating heavy machinery (risk of injury)   - being around open fire/hot surfaces (risk of burns)   - any other activities/circumstances, in which if you lose awareness, you  might injure yourself and/or others.    Please call for help (crisis line and/or 911) in case you have thoughts of harming yourself and/or others.    Please stop driving if you experience any changes/loss of awareness and/or seizures.     ------------------------------------------------------------------------------------------  Instructions for your family/caregivers:  Please call 911 if the patient has a seizure longer than 2-3 minutes, if seizures are back to back without him recovering to his baseline, or he does not start recovering within 5-10 minutes after the seizure stops. During the seizure - please turn him on his side, please make sure his head is protected (for example, you should put a pillow under his head, if one is available), and please do not put anything in his mouth.   -------------------------------------------------------------------------------------------    It is important that your seizures are well controlled and you have none or have them rarely. In addition to avoiding injury related to breakthrough seizures, frequent seizures increase risk of SUDEP (sudden unexpected death in epilepsy), where a person goes into a seizure and then never wakes up - this is a rare complication of seizure disorder; one of the best available ways to prevent it is to control your seizures well.     Due to the high volume of patients we are trying to help, your physician will not be able to respond by phone or in [employer] to your routine concerns between appointments.  This does not reflect a lack of interest or concern for you or your diagnosis.  Please bring these questions and concerns to your appointment where your physician can answer.  Please relay more pressing concerns to our office, either via MyChart, or by phone; if not able to reach us please visit nearby Urgent Care Center or Emergency Department.  If any emergent medical needs, please seek emergent medical help and/or call 911.    Please note  that we are not able to fill out paperwork that might be related to your work, utility company, disability, and/or driving, among others, in between the visits.  Please schedule a dedicated appointment to address your paperwork, so we can do that in a timely manner.  This is not due to lack of concern or interest for your disease-related work/administrative problems, but to make sure that we provide the best possible care and to fill out your paperwork in a correct and timely manner.    Thank you for entrusting your neurological care to [facility] and we look forward to continuing to serve you.    Follow up in 3 months.     My total time spent caring for the patient on the day of the encounter was 42 minutes.   This does not include time spent on separately billable procedures/tests.      Catracho Oshea MD  Outpatient Neurology   [facility]

## 2024-11-13 NOTE — PATIENT INSTRUCTIONS
Please take Keppra 2000 mg every 12 hours.     Please take Vimpat 50 mg twice daily.     Please seek emergent help if you experience any lightheadedness or changes in heart rhythm.     Please let our office know if you have any changes in your seizure frequency and/characteristics. Otherwise, please keep the diary of your events and bring it with you at the time of your next follow up visit with our office.     Please take vitamin D3 5000 internation units daily.     Please note that the following might precipitate seizures: missed doses of antiseizure medications, being sick with fever, stress, fatigue, sleep deprivation, not eating regularly, not drinking enough water, drinking too much alcohol, stopping alcohol suddenly if you are currently using it on a regular/daily basis, and/or using recreational drugs, among others.    Please note that the following might lead to an injury, potentially a life-threatening injury, in case you have a seizure and/or lose awareness while:   - being in a large body of water by yourself, such as bath, pool, lake, ocean, among others (risk of drowning)   - being on unprotected heights (risk of fall)   - being around and/or operating heavy machinery (risk of injury)   - being around open fire/hot surfaces (risk of burns)   - any other activities/circumstances, in which if you lose awareness, you might injure yourself and/or others.    Please call for help (crisis line and/or 911) in case you have thoughts of harming yourself and/or others.    Please stop driving if you experience any changes/loss of awareness and/or seizures.     ------------------------------------------------------------------------------------------  Instructions for your family/caregivers:  Please call 911 if the patient has a seizure longer than 2-3 minutes, if seizures are back to back without him recovering to his baseline, or he does not start recovering within 5-10 minutes after the seizure stops. During the  seizure - please turn him on his side, please make sure his head is protected (for example, you should put a pillow under his head, if one is available), and please do not put anything in his mouth.   -------------------------------------------------------------------------------------------    It is important that your seizures are well controlled and you have none or have them rarely. In addition to avoiding injury related to breakthrough seizures, frequent seizures increase risk of SUDEP (sudden unexpected death in epilepsy), where a person goes into a seizure and then never wakes up - this is a rare complication of seizure disorder; one of the best available ways to prevent it is to control your seizures well.     Due to the high volume of patients we are trying to help, your physician will not be able to respond by phone or in [employer] to your routine concerns between appointments.  This does not reflect a lack of interest or concern for you or your diagnosis.  Please bring these questions and concerns to your appointment where your physician can answer.  Please relay more pressing concerns to our office, either via [employer], or by phone; if not able to reach us please visit nearby Urgent Care Center or Emergency Department.  If any emergent medical needs, please seek emergent medical help and/or call 911.    Please note that we are not able to fill out paperwork that might be related to your work, utility company, disability, and/or driving, among others, in between the visits.  Please schedule a dedicated appointment to address your paperwork, so we can do that in a timely manner.  This is not due to lack of concern or interest for your disease-related work/administrative problems, but to make sure that we provide the best possible care and to fill out your paperwork in a correct and timely manner.    Thank you for entrusting your neurological care to [facility] and we look forward to continuing to serve  you.

## 2024-11-19 ENCOUNTER — TELEPHONE (OUTPATIENT)
Dept: NEUROLOGY | Facility: MEDICAL CENTER | Age: 34
End: 2024-11-19
Payer: COMMERCIAL

## 2024-11-19 DIAGNOSIS — G37.9 DEMYELINATING LESION (HCC): ICD-10-CM

## 2024-11-19 RX ORDER — ALBUTEROL SULFATE 90 UG/1
2 INHALANT RESPIRATORY (INHALATION) 4 TIMES DAILY PRN
Qty: 9 G | Refills: 0 | Status: SHIPPED | OUTPATIENT
Start: 2024-11-19

## 2024-11-19 NOTE — TELEPHONE ENCOUNTER
Received request via: Pharmacy    Was the patient seen in the last year in this department? Yes    Does the patient have an active prescription (recently filled or refills available) for medication(s) requested? No    Pharmacy Name: [facility]    Does the patient have [facility] and need 100-day supply? (This applies to ALL medications) Patient does not have SCP

## 2024-11-19 NOTE — TELEPHONE ENCOUNTER
Placed the orders for MRI cervical and thoracic spine. Please advise the patient that I would like to obtain these tests prior to his next follow up visit on 02/10/2024. These tests are done to make sure he is not having any changes similar to the one in his brain anywhere else in central nervous system. Please advise the patient as above. Thank you.

## 2024-12-10 ENCOUNTER — OFFICE VISIT (OUTPATIENT)
Dept: CARDIOLOGY | Facility: MEDICAL CENTER | Age: 34
End: 2024-12-10
Attending: STUDENT IN AN ORGANIZED HEALTH CARE EDUCATION/TRAINING PROGRAM
Payer: COMMERCIAL

## 2024-12-10 VITALS
OXYGEN SATURATION: 95 % | WEIGHT: 259 LBS | HEIGHT: 72 IN | RESPIRATION RATE: 16 BRPM | BODY MASS INDEX: 35.08 KG/M2 | DIASTOLIC BLOOD PRESSURE: 70 MMHG | HEART RATE: 86 BPM | SYSTOLIC BLOOD PRESSURE: 102 MMHG

## 2024-12-10 DIAGNOSIS — I44.1 WENCKEBACH BLOCK: ICD-10-CM

## 2024-12-10 DIAGNOSIS — I49.3 PVC'S (PREMATURE VENTRICULAR CONTRACTIONS): ICD-10-CM

## 2024-12-10 DIAGNOSIS — J45.20 MILD INTERMITTENT ASTHMA WITHOUT COMPLICATION: ICD-10-CM

## 2024-12-10 DIAGNOSIS — Z72.0 TOBACCO USE: ICD-10-CM

## 2024-12-10 DIAGNOSIS — I49.1 APC (ATRIAL PREMATURE CONTRACTIONS): ICD-10-CM

## 2024-12-10 PROCEDURE — 99214 OFFICE O/P EST MOD 30 MIN: CPT | Performed by: INTERNAL MEDICINE

## 2024-12-10 PROCEDURE — 3074F SYST BP LT 130 MM HG: CPT | Performed by: INTERNAL MEDICINE

## 2024-12-10 PROCEDURE — 3078F DIAST BP <80 MM HG: CPT | Performed by: INTERNAL MEDICINE

## 2024-12-10 PROCEDURE — 99212 OFFICE O/P EST SF 10 MIN: CPT | Performed by: INTERNAL MEDICINE

## 2024-12-10 RX ORDER — ALBUTEROL SULFATE 90 UG/1
2 INHALANT RESPIRATORY (INHALATION) 4 TIMES DAILY PRN
Qty: 9 G | Refills: 0 | Status: SHIPPED | OUTPATIENT
Start: 2024-12-10

## 2024-12-10 RX ORDER — VARENICLINE TARTRATE 1 MG/1
1 TABLET, FILM COATED ORAL 2 TIMES DAILY
Qty: 60 TABLET | Refills: 5 | Status: SHIPPED | OUTPATIENT
Start: 2024-12-10

## 2024-12-10 ASSESSMENT — FIBROSIS 4 INDEX: FIB4 SCORE: 0.62

## 2024-12-10 NOTE — LETTER
[facility] - Operated by [facility]   [address], [address]  [place] [address]  Phone: [phone]  Fax: [phone]              Rafi Zapata Jr.  1990    Encounter Date: 12/10/2024    Mimi Vang M.D.          PROGRESS NOTE:        Medical decision making:  -We reviewed his heart monitor together today.  No concerns about rare PVCs and PACs.  He has a slightly faster resting heart rate while sitting.  We discussed lifestyle modification and getting some more routine cardiovascular exercise if he can but I recognize he is very physically active at work and has a lot of demands including being present at home.  -Smoking cessation is very important for him, I am willing to prescribe Chantix, see below.  We discussed today.  -If we are managing the prescription, he will come back in 4 weeks and then we will see him every 3 to 6 months.  His PCP could take over the prescription for Chantix.  -RTC 4 weeks.      Problem list:  1. Wenckebach block    2. PVC's (premature ventricular contractions)    3. APC (atrial premature contractions)    4. Mild intermittent asthma without complication    5. Tobacco use  - varenicline (CHANTIX CONTINUING MONTH COLTEN) 1 MG tablet; Take 1 Tablet by mouth 2 times a day.  Dispense: 60 Tablet; Refill: 5     Chief Complaint:   Chief Complaint   Patient presents with   • New Patient     NP Dx: Second degree AV block   • Premature Ventricular Contractions (PVCs)     History of Present Illness:  Rafi Zapata Jr. is a 34 y.o. male who is referred by Dr. Mara Hendricks for heart block, PVCs, PACs.    Was drinking monster energy drinks, started getting some chest pressure.  Then switched to red bowl, then having chest pressure and palpitations, since a possible heart attack, presented to ED December 2021.  Stop the energy drinks, did well for a while until August 2023, seizures began.  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  Started on Vimpat and Keppra with some improvement.  Prodrome includes lightheadedness, starts in the top of the head and comes down to his feet and then he passes out.    Was having palpitations while consuming energy drinks which has resolved.  Has had some ectopy on exam, PCP got heart monitor.  His palpitations have not been an issue recently.    Has had episodes of lightheadedness upon standing, in the setting of energy drink overuse and felt dehydrated.  This has resolved.    Mild intermittent asthma, has a rescue inhaler.    No prior hypertension.  No prior hyperlipidemia.  No family history of premature coronary artery disease.  No prior smoking history.  No history of diabetes.  No history of autoimmune disease such as lupus or rheumatoid arthritis.  No history of chest radiation or cardiotoxic chemotherapy.  No chronic kidney disease.  No ETOH overuse.  No more caffeine overuse.  No more recreation substance use. Prior heavy cannabis.   No Covid.    Physically active at work.  No routine exercise.  Not limited by chest pain, pressure or tightness with activity.  No significant dyspnea on exertion, orthopnea or lower extremity swelling.  No significant lightheadedness, or presyncope/syncope.  No symptoms of leg claudication.  No stroke/TIA like symptoms.    Lives in [place].  Point of contact is his mother, Karyna Zapata.  Here today with Arabella, his fiancée. They live together with kids.   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    Wt Readings from Last 5 Encounters:   12/10/24 117 kg (259 lb)   11/13/24 118 kg (261 lb)   10/15/24 119 kg (262 lb 5.6 oz)   10/14/24 119 kg (261 lb 14.5 oz)   08/19/24 119 kg (262 lb 5.6 oz)       DATA REVIEWED by me:  ECG (my personal interpretation)  5/15/2024 sinus, 98, early repolarization normal variant    Heart monitor   9/23/2024  Sinus rhythm, average rate 95 bpm.  No  "rhythm changes.  Rare PVCs and PACs.  Symptoms reported with sinus rhythm.    Echo  N/A    Most recent labs:       Lab Results   Component Value Date/Time    WBC 10.4 05/15/2024 11:30 PM    HEMOGLOBIN 17.1 05/15/2024 11:30 PM    HEMATOCRIT 47.9 05/15/2024 11:30 PM    MCV 92.3 05/15/2024 11:30 PM      Lab Results   Component Value Date/Time    SODIUM 138 05/15/2024 11:30 PM    POTASSIUM 4.2 05/15/2024 11:30 PM    CHLORIDE 107 05/15/2024 11:30 PM    CO2 19 (L) 05/15/2024 11:30 PM    GLUCOSE 121 (H) 05/15/2024 11:30 PM    BUN 15 05/15/2024 11:30 PM    CREATININE 0.83 05/15/2024 11:30 PM      Lab Results   Component Value Date/Time    ASTSGOT 25 05/15/2024 11:30 PM    ALTSGPT 34 05/15/2024 11:30 PM    ALBUMIN 4.1 05/15/2024 11:30 PM      No results found for: \"CHOLSTRLTOT\", \"LDL\", \"HDL\", \"TRIGLYCERIDE\"  No results for input(s): \"NTPROBNP\", \"TROPONINT\" in the last 72 hours.      Past Medical History:   Diagnosis Date   • ASTHMA    • Seizure disorder (HCC)      History reviewed. No pertinent surgical history.  Family History   Problem Relation Age of Onset   • Diabetes Mother    • Diabetes Father    • Bladder cancer Sister    • Bladder cancer Paternal Aunt    • Leukemia Paternal Uncle      Social History     Socioeconomic History   • Marital status: Single     Spouse name: Not on file   • Number of children: Not on file   • Years of education: Not on file   • Highest education level: Not on file   Occupational History   • Not on file   Tobacco Use   • Smoking status: Every Day     Current packs/day: 1.00     Average packs/day: 1 pack/day for 15.0 years (15.0 ttl pk-yrs)     Types: Cigarettes   • Smokeless tobacco: Never   Vaping Use   • Vaping status: Never Used   Substance and Sexual Activity   • Alcohol use: No   • Drug use: Not Currently     Types: Inhaled, Marijuana     Comment: marijuana   • Sexual activity: Yes     Partners: Female   Other Topics Concern   • Not on file   Social History Narrative   • Not on file "     Social Drivers of Health     Financial Resource Strain: Not on file   Food Insecurity: Not on file   Transportation Needs: Not on file   Physical Activity: Not on file   Stress: Not on file   Social Connections: Not on file   Intimate Partner Violence: Not on file   Housing Stability: Not on file     No Known Allergies    Current Outpatient Medications   Medication Sig Dispense Refill   • varenicline (CHANTIX CONTINUING MONTH COLTEN) 1 MG tablet Take 1 Tablet by mouth 2 times a day. 60 Tablet 5   • lacosamide (VIMPAT) 50 MG Tab tablet Take 1 Tablet by mouth 2 times a day for 180 days. 60 Tablet 5   • levetiracetam (KEPPRA) 1000 MG tablet Take 2 Tablets by mouth 2 times a day. 120 Tablet 5   • fluticasone (FLONASE) 50 MCG/ACT nasal spray Administer 2 Sprays into affected nostril(S) every day. 16 g 3   • Vitamin D3 5000 Unit (125 mcg) Tab Take 1 Tablet by mouth every day. 90 Tablet 3   • albuterol 108 (90 Base) MCG/ACT Aero Soln inhalation aerosol INHALE 2 PUFFS BY MOUTH 4 TIMES DAILY AS NEEDED 9 g 0     No current facility-administered medications for this visit.       ROS  All others systems reviewed and negative.    /70 (BP Location: Left arm, Patient Position: Sitting, BP Cuff Size: Adult)   Pulse 86   Resp 16   Ht 1.829 m (6')   Wt 117 kg (259 lb)   SpO2 95%  Body mass index is 35.13 kg/m².    General: No acute distress. Well nourished.  HEENT: EOM grossly intact, no scleral icterus, no pharyngeal erythema.   Neck:  No JVD, no bruits, trachea midline  CVS: RRR. Normal S1, S2. No M/R/G. No LE edema.  2+ radial pulses, 2+ PT pulses  Resp: CTAB. No wheezing or crackles/rhonchi. Normal respiratory effort.  Abdomen: Soft, NT, no suyapa hepatomegaly.  MSK/Ext: No clubbing or cyanosis.  Skin: Warm and dry, no rashes.  Neurological: CN III-XII grossly intact. No focal deficits.   Psych: A&O x 3, appropriate affect, good judgement      Return in about 4 weeks (around 1/7/2025).    Written instructions given  today:      Varenicline- generic for Chantix  *Your prescription will say you are taking 1 mg twice daily but in reality, you have to ramp up, follow these instructions carefully:  Days 1 to 3: 0.5 milligram (mg) once a day (1/2 tablet).  Days 4 to 7: 0.5 mg 2 times per day (1/2 tablet).  Days 8 to end of treatment: 1 mg 2 times per day.    -In general we prefer the Mediterranean diet which is heavy on the all of oils and vegetables, fish and chicken.  Avoid carbohydrates and anything that drives up the glycemic index.  Some foods can cause gut inflammation and weight gain such as nightshades which includes all tomato products.  There are ways to repair rice and advanced that reduce the gut inflammation and weight gain. Watch out for salad dressing, go for the vinaigrette's, olive oil and vinegar.    -I am not concerned about your heart monitor.  We see PVCs and PACs on everyone's heart monitor, they are rare and not an issue.  Your faster heart rates in a resting position are not concerning.  We recommend cardiovascular retraining, this can improve if you try to walk constantly or pedaling a bicycle for 15 to 20 minutes most days.  Also, your physiology should get better over time after you quit smoking.    -I have no concerns about Wenckebach which are happening during sleep hours, that is time for the heart to rest.    -If your PCP wants to take over the prescription, you do not need to return to see us.  As long as my name is on the prescription, you do need to come back in and check with the stomach sure you are doing okay.  I will request a follow-up in about 4 weeks.      It is my pleasure to participate in the care of Mr. Zapata.  Please do not hesitate to contact me with questions or concerns.    Mimi Vang MD, [facility]  Cardiologist, [facility]    Please note that this dictation was created using voice recognition software. I have made every reasonable attempt to  correct obvious errors, but it is possible there are errors of grammar and possibly content that I did not discover before finalizing the note.      Mara Hendricks M.D.  [address]  [facility] [address]  Via In Basket

## 2024-12-10 NOTE — PATIENT INSTRUCTIONS
Varenicline- generic for Chantix  *Your prescription will say you are taking 1 mg twice daily but in reality, you have to ramp up, follow these instructions carefully:  Days 1 to 3: 0.5 milligram (mg) once a day (1/2 tablet).  Days 4 to 7: 0.5 mg 2 times per day (1/2 tablet).  Days 8 to end of treatment: 1 mg 2 times per day.    -In general we prefer the Mediterranean diet which is heavy on the all of oils and vegetables, fish and chicken.  Avoid carbohydrates and anything that drives up the glycemic index.  Some foods can cause gut inflammation and weight gain such as nightshades which includes all tomato products.  There are ways to repair rice and advanced that reduce the gut inflammation and weight gain. Watch out for salad dressing, go for the vinaigrette's, olive oil and vinegar.    -I am not concerned about your heart monitor.  We see PVCs and PACs on everyone's heart monitor, they are rare and not an issue.  Your faster heart rates in a resting position are not concerning.  We recommend cardiovascular retraining, this can improve if you try to walk constantly or pedaling a bicycle for 15 to 20 minutes most days.  Also, your physiology should get better over time after you quit smoking.    -I have no concerns about Wenckebach which are happening during sleep hours, that is time for the heart to rest.    -If your PCP wants to take over the prescription, you do not need to return to see us.  As long as my name is on the prescription, you do need to come back in and check with the stomach sure you are doing okay.  I will request a follow-up in about 4 weeks.

## 2024-12-10 NOTE — PROGRESS NOTES
Medical decision making:  -We reviewed his heart monitor together today.  No concerns about rare PVCs and PACs.  He has a slightly faster resting heart rate while sitting.  We discussed lifestyle modification and getting some more routine cardiovascular exercise if he can but I recognize he is very physically active at work and has a lot of demands including being present at home.  -Smoking cessation is very important for him, I am willing to prescribe Chantix, see below.  We discussed today.  -If we are managing the prescription, he will come back in 4 weeks and then we will see him every 3 to 6 months.  His PCP could take over the prescription for Chantix.  -RTC 4 weeks.      Problem list:  1. Wenckebach block    2. PVC's (premature ventricular contractions)    3. APC (atrial premature contractions)    4. Mild intermittent asthma without complication    5. Tobacco use  - varenicline (CHANTIX CONTINUING MONTH PAK) 1 MG tablet; Take 1 Tablet by mouth 2 times a day.  Dispense: 60 Tablet; Refill: 5     Chief Complaint:   Chief Complaint   Patient presents with    New Patient     NP Dx: Second degree AV block    Premature Ventricular Contractions (PVCs)     History of Present Illness:  Rafi Zapata Jr. is a 34 y.o. male who is referred by Dr. Mara Hendricks for heart block, PVCs, PACs.    Was drinking monster energy drinks, started getting some chest pressure.  Then switched to red bowl, then having chest pressure and palpitations, since a possible heart attack, presented to ED December 2021.  Stop the energy drinks, did well for a while until August 2023, seizures began.  Sees Dr. 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  Started on Vimpat and Keppra with some improvement.  Prodrome includes lightheadedness, starts in the top of the head and comes down to his feet and then he passes out.    Was having palpitations while consuming energy drinks which has resolved.  Has  had some ectopy on exam, PCP got heart monitor.  His palpitations have not been an issue recently.    Has had episodes of lightheadedness upon standing, in the setting of energy drink overuse and felt dehydrated.  This has resolved.    Mild intermittent asthma, has a rescue inhaler.    No prior hypertension.  No prior hyperlipidemia.  No family history of premature coronary artery disease.  No prior smoking history.  No history of diabetes.  No history of autoimmune disease such as lupus or rheumatoid arthritis.  No history of chest radiation or cardiotoxic chemotherapy.  No chronic kidney disease.  No ETOH overuse.  No more caffeine overuse.  No more recreation substance use. Prior heavy cannabis.   No Covid.    Physically active at work.  No routine exercise.  Not limited by chest pain, pressure or tightness with activity.  No significant dyspnea on exertion, orthopnea or lower extremity swelling.  No significant lightheadedness, or presyncope/syncope.  No symptoms of leg claudication.  No stroke/TIA like symptoms.    Lives in [place].  Point of contact is his mother, Karyna Zapata.  Here today with Arabella, his fiancée. They live together with kids.   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    Wt Readings from Last 5 Encounters:   12/10/24 117 kg (259 lb)   11/13/24 118 kg (261 lb)   10/15/24 119 kg (262 lb 5.6 oz)   10/14/24 119 kg (261 lb 14.5 oz)   08/19/24 119 kg (262 lb 5.6 oz)       DATA REVIEWED by me:  ECG (my personal interpretation)  5/15/2024 sinus, 98, early repolarization normal variant    Heart monitor   9/23/2024  Sinus rhythm, average rate 95 bpm.  No rhythm changes.  Rare PVCs and PACs.  Symptoms reported with sinus rhythm.    Echo  N/A    Most recent labs:       Lab Results   Component Value Date/Time    WBC 10.4 05/15/2024 11:30 PM    HEMOGLOBIN 17.1 05/15/2024 11:30 PM    HEMATOCRIT 47.9 05/15/2024 11:30 PM    MCV 92.3 05/15/2024 11:30 PM      Lab Results   Component  "Value Date/Time    SODIUM 138 05/15/2024 11:30 PM    POTASSIUM 4.2 05/15/2024 11:30 PM    CHLORIDE 107 05/15/2024 11:30 PM    CO2 19 (L) 05/15/2024 11:30 PM    GLUCOSE 121 (H) 05/15/2024 11:30 PM    BUN 15 05/15/2024 11:30 PM    CREATININE 0.83 05/15/2024 11:30 PM      Lab Results   Component Value Date/Time    ASTSGOT 25 05/15/2024 11:30 PM    ALTSGPT 34 05/15/2024 11:30 PM    ALBUMIN 4.1 05/15/2024 11:30 PM      No results found for: \"CHOLSTRLTOT\", \"LDL\", \"HDL\", \"TRIGLYCERIDE\"  No results for input(s): \"NTPROBNP\", \"TROPONINT\" in the last 72 hours.      Past Medical History:   Diagnosis Date    ASTHMA     Seizure disorder (HCC)      History reviewed. No pertinent surgical history.  Family History   Problem Relation Age of Onset    Diabetes Mother     Diabetes Father     Bladder cancer Sister     Bladder cancer Paternal Aunt     Leukemia Paternal Uncle      Social History     Socioeconomic History    Marital status: Single     Spouse name: Not on file    Number of children: Not on file    Years of education: Not on file    Highest education level: Not on file   Occupational History    Not on file   Tobacco Use    Smoking status: Every Day     Current packs/day: 1.00     Average packs/day: 1 pack/day for 15.0 years (15.0 ttl pk-yrs)     Types: Cigarettes    Smokeless tobacco: Never   Vaping Use    Vaping status: Never Used   Substance and Sexual Activity    Alcohol use: No    Drug use: Not Currently     Types: Inhaled, Marijuana     Comment: marijuana    Sexual activity: Yes     Partners: Female   Other Topics Concern    Not on file   Social History Narrative    Not on file     Social Drivers of Health     Financial Resource Strain: Not on file   Food Insecurity: Not on file   Transportation Needs: Not on file   Physical Activity: Not on file   Stress: Not on file   Social Connections: Not on file   Intimate Partner Violence: Not on file   Housing Stability: Not on file     No Known Allergies    Current Outpatient " Medications   Medication Sig Dispense Refill    varenicline (CHANTIX CONTINUING MONTH COLTEN) 1 MG tablet Take 1 Tablet by mouth 2 times a day. 60 Tablet 5    lacosamide (VIMPAT) 50 MG Tab tablet Take 1 Tablet by mouth 2 times a day for 180 days. 60 Tablet 5    levetiracetam (KEPPRA) 1000 MG tablet Take 2 Tablets by mouth 2 times a day. 120 Tablet 5    fluticasone (FLONASE) 50 MCG/ACT nasal spray Administer 2 Sprays into affected nostril(S) every day. 16 g 3    Vitamin D3 5000 Unit (125 mcg) Tab Take 1 Tablet by mouth every day. 90 Tablet 3    albuterol 108 (90 Base) MCG/ACT Aero Soln inhalation aerosol INHALE 2 PUFFS BY MOUTH 4 TIMES DAILY AS NEEDED 9 g 0     No current facility-administered medications for this visit.       ROS  All others systems reviewed and negative.    /70 (BP Location: Left arm, Patient Position: Sitting, BP Cuff Size: Adult)   Pulse 86   Resp 16   Ht 1.829 m (6')   Wt 117 kg (259 lb)   SpO2 95%  Body mass index is 35.13 kg/m².    General: No acute distress. Well nourished.  HEENT: EOM grossly intact, no scleral icterus, no pharyngeal erythema.   Neck:  No JVD, no bruits, trachea midline  CVS: RRR. Normal S1, S2. No M/R/G. No LE edema.  2+ radial pulses, 2+ PT pulses  Resp: CTAB. No wheezing or crackles/rhonchi. Normal respiratory effort.  Abdomen: Soft, NT, no suyapa hepatomegaly.  MSK/Ext: No clubbing or cyanosis.  Skin: Warm and dry, no rashes.  Neurological: CN III-XII grossly intact. No focal deficits.   Psych: A&O x 3, appropriate affect, good judgement      Return in about 4 weeks (around 1/7/2025).    Written instructions given today:      Varenicline- generic for Chantix  *Your prescription will say you are taking 1 mg twice daily but in reality, you have to ramp up, follow these instructions carefully:  Days 1 to 3: 0.5 milligram (mg) once a day (1/2 tablet).  Days 4 to 7: 0.5 mg 2 times per day (1/2 tablet).  Days 8 to end of treatment: 1 mg 2 times per day.    -In general  we prefer the Mediterranean diet which is heavy on the all of oils and vegetables, fish and chicken.  Avoid carbohydrates and anything that drives up the glycemic index.  Some foods can cause gut inflammation and weight gain such as nightshades which includes all tomato products.  There are ways to repair rice and advanced that reduce the gut inflammation and weight gain. Watch out for salad dressing, go for the vinaigrette's, olive oil and vinegar.    -I am not concerned about your heart monitor.  We see PVCs and PACs on everyone's heart monitor, they are rare and not an issue.  Your faster heart rates in a resting position are not concerning.  We recommend cardiovascular retraining, this can improve if you try to walk constantly or pedaling a bicycle for 15 to 20 minutes most days.  Also, your physiology should get better over time after you quit smoking.    -I have no concerns about Wenckebach which are happening during sleep hours, that is time for the heart to rest.    -If your PCP wants to take over the prescription, you do not need to return to see us.  As long as my name is on the prescription, you do need to come back in and check with the stomach sure you are doing okay.  I will request a follow-up in about 4 weeks.      It is my pleasure to participate in the care of Mr. Zapata.  Please do not hesitate to contact me with questions or concerns.    Mimi Vang MD, [facility]  Cardiologist, [facility]    Please note that this dictation was created using voice recognition software. I have made every reasonable attempt to correct obvious errors, but it is possible there are errors of grammar and possibly content that I did not discover before finalizing the note.

## 2025-01-02 RX ORDER — ALBUTEROL SULFATE 90 UG/1
2 INHALANT RESPIRATORY (INHALATION) 4 TIMES DAILY PRN
Qty: 9 G | Refills: 0 | Status: SHIPPED | OUTPATIENT
Start: 2025-01-02 | End: 2025-01-30

## 2025-01-22 ENCOUNTER — TELEPHONE (OUTPATIENT)
Dept: CARDIOLOGY | Facility: MEDICAL CENTER | Age: 35
End: 2025-01-22
Payer: COMMERCIAL

## 2025-01-23 ENCOUNTER — APPOINTMENT (OUTPATIENT)
Dept: RADIOLOGY | Facility: MEDICAL CENTER | Age: 35
End: 2025-01-23
Attending: PSYCHIATRY & NEUROLOGY
Payer: COMMERCIAL

## 2025-01-23 DIAGNOSIS — G37.9 DEMYELINATING LESION (HCC): ICD-10-CM

## 2025-01-23 PROCEDURE — 72156 MRI NECK SPINE W/O & W/DYE: CPT

## 2025-01-23 PROCEDURE — 700117 HCHG RX CONTRAST REV CODE 255: Mod: JZ | Performed by: PSYCHIATRY & NEUROLOGY

## 2025-01-23 PROCEDURE — A9579 GAD-BASE MR CONTRAST NOS,1ML: HCPCS | Mod: JZ | Performed by: PSYCHIATRY & NEUROLOGY

## 2025-01-23 PROCEDURE — 72157 MRI CHEST SPINE W/O & W/DYE: CPT

## 2025-01-23 RX ADMIN — GADOTERIDOL 20 ML: 279.3 INJECTION, SOLUTION INTRAVENOUS at 09:30

## 2025-01-30 RX ORDER — ALBUTEROL SULFATE 90 UG/1
2 INHALANT RESPIRATORY (INHALATION) 4 TIMES DAILY PRN
Qty: 9 G | Refills: 0 | Status: SHIPPED | OUTPATIENT
Start: 2025-01-30

## 2025-01-30 NOTE — TELEPHONE ENCOUNTER
Received request via: Pharmacy    Was the patient seen in the last year in this department? Yes    Does the patient have an active prescription (recently filled or refills available) for medication(s) requested? No    Pharmacy Name: [facility]    Does the patient have custodial Plus and need 100-day supply? (This applies to ALL medications) Patient does not have SCP

## 2025-02-10 ENCOUNTER — OFFICE VISIT (OUTPATIENT)
Dept: NEUROLOGY | Facility: MEDICAL CENTER | Age: 35
End: 2025-02-10
Attending: PSYCHIATRY & NEUROLOGY

## 2025-02-10 VITALS
DIASTOLIC BLOOD PRESSURE: 76 MMHG | TEMPERATURE: 98.3 F | BODY MASS INDEX: 34.95 KG/M2 | RESPIRATION RATE: 16 BRPM | WEIGHT: 258 LBS | OXYGEN SATURATION: 94 % | SYSTOLIC BLOOD PRESSURE: 98 MMHG | HEART RATE: 89 BPM | HEIGHT: 72 IN

## 2025-02-10 DIAGNOSIS — G37.9 DEMYELINATING LESION (HCC): ICD-10-CM

## 2025-02-10 DIAGNOSIS — R56.9 SEIZURE (HCC): ICD-10-CM

## 2025-02-10 DIAGNOSIS — G35 MULTIPLE SCLEROSIS (HCC): ICD-10-CM

## 2025-02-10 PROCEDURE — 99212 OFFICE O/P EST SF 10 MIN: CPT | Performed by: PSYCHIATRY & NEUROLOGY

## 2025-02-10 PROCEDURE — 3078F DIAST BP <80 MM HG: CPT | Performed by: PSYCHIATRY & NEUROLOGY

## 2025-02-10 PROCEDURE — 3074F SYST BP LT 130 MM HG: CPT | Performed by: PSYCHIATRY & NEUROLOGY

## 2025-02-10 PROCEDURE — 99215 OFFICE O/P EST HI 40 MIN: CPT | Performed by: PSYCHIATRY & NEUROLOGY

## 2025-02-10 ASSESSMENT — PATIENT HEALTH QUESTIONNAIRE - PHQ9: CLINICAL INTERPRETATION OF PHQ2 SCORE: 0

## 2025-02-10 ASSESSMENT — FIBROSIS 4 INDEX: FIB4 SCORE: 0.62

## 2025-02-10 NOTE — PATIENT INSTRUCTIONS
Please research the following medications for MS:  Kesimpta  Ocrevus  Tysabri    Please take Keppra 2000 mg every 12 hours.     Please take Vimpat 50 mg twice daily.     Please seek emergent help if you experience any lightheadedness or changes in heart rhythm.     Please let our office know if you have any changes in your seizure frequency and/characteristics. Otherwise, please keep the diary of your events and bring it with you at the time of your next follow up visit with our office.     Please take vitamin D3 5000 internation units daily.     Please note that the following might precipitate seizures: missed doses of antiseizure medications, being sick with fever, stress, fatigue, sleep deprivation, not eating regularly, not drinking enough water, drinking too much alcohol, stopping alcohol suddenly if you are currently using it on a regular/daily basis, and/or using recreational drugs, among others.    Please note that the following might lead to an injury, potentially a life-threatening injury, in case you have a seizure and/or lose awareness while:   - being in a large body of water by yourself, such as bath, pool, lake, ocean, among others (risk of drowning)   - being on unprotected heights (risk of fall)   - being around and/or operating heavy machinery (risk of injury)   - being around open fire/hot surfaces (risk of burns)   - any other activities/circumstances, in which if you lose awareness, you might injure yourself and/or others.    Please call for help (crisis line and/or 911) in case you have thoughts of harming yourself and/or others.    Please stop driving if you experience any changes/loss of awareness and/or seizures.     ------------------------------------------------------------------------------------------  Instructions for your family/caregivers:  Please call 911 if the patient has a seizure longer than 2-3 minutes, if seizures are back to back without him recovering to his baseline, or he  does not start recovering within 5-10 minutes after the seizure stops. During the seizure - please turn him on his side, please make sure his head is protected (for example, you should put a pillow under his head, if one is available), and please do not put anything in his mouth.   -------------------------------------------------------------------------------------------    It is important that your seizures are well controlled and you have none or have them rarely. In addition to avoiding injury related to breakthrough seizures, frequent seizures increase risk of SUDEP (sudden unexpected death in epilepsy), where a person goes into a seizure and then never wakes up - this is a rare complication of seizure disorder; one of the best available ways to prevent it is to control your seizures well.     Due to the high volume of patients we are trying to help, your physician will not be able to respond by phone or in [employer] to your routine concerns between appointments.  This does not reflect a lack of interest or concern for you or your diagnosis.  Please bring these questions and concerns to your appointment where your physician can answer.  Please relay more pressing concerns to our office, either via [employer], or by phone; if not able to reach us please visit nearby Urgent Care Center or Emergency Department.  If any emergent medical needs, please seek emergent medical help and/or call 911.    Please note that we are not able to fill out paperwork that might be related to your work, utility company, disability, and/or driving, among others, in between the visits.  Please schedule a dedicated appointment to address your paperwork, so we can do that in a timely manner.  This is not due to lack of concern or interest for your disease-related work/administrative problems, but to make sure that we provide the best possible care and to fill out your paperwork in a correct and timely manner.    Thank you for entrusting your  neurological care to [facility] and we look forward to continuing to serve you.

## 2025-02-10 NOTE — PROGRESS NOTES
[facility]  Follow Up Visit      Patient's Name: Rafi Zapata Jr.  YOB: 1990  MRN: [id number]  Date of Service: 02/10/25.    Referring Provider: Alfredito Valdivia M.D.  [address] [place] [address] Emergency Room  [place]  [place] [id number]    Chief Concern: Seizures. Multiple Sclerosis.     The patient presents for a follow up. The patient presents with his wife and provides verbal consent for her to be present and provide additional history as necessary.      HPI (as obtained at the time of the initial visit and updated as necessary): The patient is a 34 y.o., right-handed male, who initially presented to my epilepsy clinic for evaluation of seizures on 08/28/2023.    The patient had his first known seizure on 08/13/2023, as noted under event type #1. He his second similar event on 08/24/2023, and was started on Keppra after the second event.    He never had isolated staring spells. No history of oral/manual automatisms. No history of gastric uprising. No autonomic phenomena. No myoclonus. No nocturnal seizures as of the time of the initial visit with me.     He has metallic/blood taste after eating chips.     He is on Keppra and has no adverse effects from it, specifically, no mood issues; there was fatigue at the start of Keppra, but this got better.     He has asthma is overall well controlled at this time.    He used to smoke marijuana heavily and stopped on 08/13/2023.     The patient used to smoke high potency marijuana, energy drinks, and was on Abx, and in that context, had an episode of bilateral body numbness that lasted for an hour and did not come back after he stopped drinking energy drinks.     The patient experienced what appears to be his first symptoms of multiple sclerosis in January 2025.  The symptoms were described as left leg tingling followed by left leg numbness lasting for several days.  He had no other symptoms at that time.    As of February 2025, except  "above-noted transient left leg numbness/tingling, the patient does not have significant fatigue, no muscle weakness anywhere, no trouble with balance, no changes in vision, no problems with walking, and no neuropathic pain.  He did not express any concerns about bowel/bladder challenges.    Semiology:  Event type #1: \"Seizures\".  Year/Age of Onset: August 2023.  Initial features: Lightheadedness that started on top of his head and his head felt cold. He at times experiences karlee vu for several hours prior to the event. Smelling a strange smell (smoke-like).   Event features: Stared off, loses awareness, and turned his head to the right. This is followed by a bilateral tonic-clonic movements. He has no recollection for the time during the event and is he responsive during the event. No tongue bite. No bladder/bowel incontinence.  Post-ictal features: Feels very confused and sore after the event.   Duration: Around 1 minute.   Frequency: The first event was on 08/13/2023. The next event was on 08/24/2023. The next event was on 09/14/2023. The next event was on 05/15/2024 and continued to have seizures through the summer of 2024. The last event was on 08/10/2024.   Precipitating factors: Stress. Heat. Exposure to gun cleaning solvent (this was 3 hours prior to his initial seizure).     Event type #2: \"Spells\".  Year/Age of Onset: August 2023.  Initial features: Sensation of weird smell. Feeling lightheaded.   Event features: No confusion nor loss of awareness. He tries to focus on different things while experiencing above.   Post-ictal features: Back to baseline.  Duration: 1-2 minutes.  Frequency: At times in clusters (up to 5 per night). Events during the summer of 2024. None since July 2024.    Precipitating factors: Not clear.     History of status epilepticus: no  History of physical injury related to seizures: yes  History of surgery related to epilepsy: no  Family Planning: N/A  Current Driving Status: Not " "driving at this time.   Seizure Clusters: No   Longest Seizure Freedom: Seizure free since 08/10/2024    Pertinent Ancillary Test Results:    MRI brain studies:   - MRI brain (09/10/2023  at [facility]): \"1.  MRI of the brain without and with contrast within normal limits. 2.  Scattered paranasal sinus disease.\" I reviewed key images with the patient on 09/26/2023.     - MRI brain w/wo contrast (10/26/2024, [facility]): \"IMPRESSION: 1.  There are small periventricular T2 hyperintensities noted adjacent to the atrium of the right lateral ventricle suspicious for chronic demyelinating areas. These findings are new since the previous study. There are no enhancing lesions. There are no posterior fossa lesions. These findings does not fulfill the Powell MR criteria for diagnosing demyelinating plaques of multiple sclerosis. Please correlate with clinical and other laboratory evidence. 2.  In view of history of seizure, there is no MR evidence of cortical dysplasia,  neuronal migrational abnormality or hippocampal sclerosis.\" I reviewed images with the patient on 11/13/2024.      - MRI c-spine w/wo contrast (01/23/2025): \"IMPRESSION: 1.  Focal, well-circumscribed T2 signal abnormality likely representing a demyelinating lesion noted in the right lateral hemicord at the C5 level with mild enhancement.\" I reviewed images on 02/10/2025.     - MRI t-spine w/wo contrast (01/23/2025): \"MRI of the thoracic spine without and with contrast within normal limits. \" I reviewed images on 02/10/2025.    CT head studies:   - CT head wo contrast (08/14/2023 at [facility]): \"No acute intracranial abnormality. Paranasal sinusitis.\" I reviewed the images on 08/28/2023.     EEG studies:   - Standard EEG - none available for my review.      - 23-hour ambulatory EEG (10/24/2023, Dr. Oshea): This was, within the above noted limitations, a normal ambulatory EEG recording in the awake and drowsy/sleep state(s):  -No regional slowing or persistent focal " asymmetries were seen.  -No epileptiform discharges or other epileptiform phenomena seen.  -No seizures. Clinical correlation is recommended.  -Clinical Events: None.  -Single lead EKG showed occasional PVCs - please correlate clinically.      - 1-day ambulatory EEG (10/29/2024, Dr. Oshea): This was an abnormal ambulatory EEG recording in the awake and drowsy/sleep state(s):  The presence of occasional to frequent, left temporal, maximal over the F7 contact, epileptiform discharges, points toward increased propensity toward focal seizures arising from that region.   No definitive seizures.   Clinical Events: None.        INTERIM HISTORY (02/10/2025): The patient continues to do well from seizure perspective and has been seizure-free since 8/10/2024.  He is taking his Keppra and Vimpat regularly, and has no adverse effects from these.    The patient unfortunately experienced left leg tingling followed by numbness for several days, which resolved in the meantime.  No other symptoms concerning for MS disease flare up at this time.    The patient unfortunately was let go from his job in the meantime.    Current Antiseizure Medications: Keppra 2000 mg BID. Vimpat 50 mg BID.     Previously Tried Antiseizure Medications: None.     Review of Systems: He had a recent cold, but no fevers. He lost 3 lbs in the interim. The mood is overall stable. No SI/HI.     Risk Factors For Epilepsy/Seizure Disorder: The patient is a product of normal pregnancy and uncomplicated delivery. The early development was normal and the patient started waking, talking, and engaging in social interaction as expected. There were no challenges during school and no reported attendance of special education programs. There is no history of febrile seizures. There is no history of head trauma. There is no history of stroke. There is no history of CNS infections, such as encephalitis and/or meningitis. There is no history of neurosurgical  interventions. There is no reported history of staring spells during childhood/school years.    Past Medical History:  Past Medical History:   Diagnosis Date    ASTHMA     Seizure disorder (HCC)      Past Surgical History:  Ear surgery as child. Right leg fracture (not sure if there was a surgical repair).     Social History:  Social History     Tobacco Use    Smoking status: Every Day     Current packs/day: 1.00     Average packs/day: 1 pack/day for 15.0 years (15.0 ttl pk-yrs)     Types: Cigarettes    Smokeless tobacco: Never   Vaping Use    Vaping status: Never Used   Substance Use Topics    Alcohol use: No    Drug use: Not Currently     Types: Inhaled, Marijuana     Comment: not currently     Family History:  There is no known family history of seizures/epilepsy.        2/10/2025     3:00 PM 11/13/2024     3:30 PM 10/14/2024    11:30 AM 8/19/2024     4:30 PM 5/20/2024     4:00 PM   PHQ-9 Screening   Little interest or pleasure in doing things 0 - not at all 0 - not at all 0 - not at all 0 - not at all 0 - not at all   Feeling down, depressed, or hopeless 0 - not at all 0 - not at all 0 - not at all 0 - not at all 0 - not at all   PHQ-2 Total Score 0 0 0 0 0     Carroll Suicide Reassessment  New or continued thoughts about killing self?: No  Preparing to end life?: No    Allergies:  No Known Allergies    Current Medications:    Current Outpatient Medications:     albuterol, 2 Puff, Inhalation, 4X/DAY PRN, PRN    lacosamide, 50 mg, Oral, BID, Taking    levETIRAcetam, 2,000 mg, Oral, BID, Taking    fluticasone, 2 Spray, Nasal, DAILY, Taking    vitamin D3, 5,000 Units, Oral, DAILY, Taking    varenicline, 1 mg, Oral, BID (Patient not taking: Reported on 2/10/2025), Not Taking    Physical Examination:    Ambulatory Vitals  Encounter Vitals  Temperature: 36.8 °C (98.3 °F)  Temp src: Temporal  Blood Pressure: 98/76  Pulse: 89  Respiration: 16  Pulse Oximetry: 94 %  Weight: 117 kg (258 lb)  Height: 182.9 cm (6')  BMI  (Calculated): 34.99    Neurological Examination:  Mental Status: The patient is alert and oriented to person, place, time, and situation. Speech is fluent, with no aphasia nor dysarthria noted. Affect is normal.    Cranial Nerve Examination:  CN I: Olfaction examination is deferred.  CN II: Visual fields are full to confrontation examination and show no visual field defect.   CN III, IV, VI: Eye movements are normal in all directions. Pupils are reactive to direct and consensual light. There is no relative afferent pupillary defect. There is no nystagmus.  CN V: Facial sensation to light touch is intact throughout.   CN VII: No significant facial muscle or other soft tissue asymmetry.  CN VIII: Hearing intact to rubbing sounds bilaterally.   CN IX, X: Soft palate elevates symmetrically.  CN XI: Symmetrical shoulder shrug exam.  CN XII: Midline tongue protrusion and moves symmetrically to each side.     Motor Examination:  Muscle strength is intact throughout. Muscle tone is normal throughout. No abnormal movements are observed. No pronator drift is noted.     Muscle Stretch Reflexes Examination:  Deferred.     Sensory Examination:  Preserved sensation to light touch in all extremities.     Coordination:  Normal finger to nose testing bilaterally, no postural nor intentional tremor was noted.     Stance/gait:  Normal regular gait with normal arm swings and stride length. Able to perform tandem gait. Romberg sign is absent.     Labs:   - Keppra (09/26/2023): 13   - Keppra (02/12/2024): 16    ASSESSMENT AND PLAN:  1. 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  The presence of small periventricular T2 hyperintensities noted adjacent to the atrium of the right lateral ventricle on MRI brain done in October 2024 (new compared to MRI brain done in 2023) is suspicious for chronic demyelinating area and might be related to his new onset focal epilepsy in 2023.  Ambulatory EEG done in October 2024  showed occasional to frequent left anterior temporal discharges.  In that context, the patient has a diagnosis consistent with focal epilepsy, lateralizing to the left, localizing to the left, most likely mesial, temporal lobe.    The patient remains seizure-free on current antiseizure medication regimen.  Adverse effects were briefly discussed.    - lacosamide (VIMPAT) 50 MG Tab tablet; Take 1 Tablet by mouth 2 times a day for 180 days.  Dispense: 60 Tablet; Refill: 5 [refills not needed today]  - levETIRAcetam (KEPPRA) 1000 MG tablet; Take 2 Tablets by mouth 2 times a day.  Dispense: 120 Tablet; Refill: 5 [refills not needed today]    Since the patient has been seizure-free for more than 3 months now, he may continue to drive.    We will defer repeating MRI brain/EEG at this time, from seizure perspective.     Epilepsy counseling provided in writing.    2. Demyelinating lesion (HCC)/Multiple sclerosis.   There are small periventricular T2 hyperintensities noted adjacent to the atrium of the right lateral ventricle suspicious for chronic demyelinating areas. This is a new change noted on MRI brain in October 2024, when compared to MRI brain done in September 2023.  The patient experienced a left leg tingling, followed by numbness for several days in January 2025.  This, along with the findings on MRI cervical spine in January 2025, consistent with subacute demyelinating lesion at C5 level (focal, well-circumscribed T2 signal abnormality likely representing a demyelinating lesion noted in the right lateral hemicord at the C5 level with mild enhancement), establishes the diagnosis of multiple sclerosis.  We discussed in detail that we may obtain additional tests, including spinal tap, to confirm the diagnosis.  The patient would like to defer CSF workup at this time.    We will obtain tests to rule out MOGAD/NMOSD:  - CNS DEMYELINATING DISEASE RFLX PANEL; Future  - Aquaporin-4 Receptor IgG, Serum, rflx Titer;  Future    The patient will research Kesimpta, Ocrevus, and Tysabri, and we will avoid sphingosine-1-phosphate receptor (S1PR) modulators at this time due to the history of heart rhythm changes. In preparation for MS DMT, we will obtain the following labs:  - CBC WITH DIFFERENTIAL; Future  - Comp Metabolic Panel; Future  - HBCAB+HBSAB+AG  - HEP C VIRUS ANTIBODY; Future  - IGA SERUM QUANT; Future  - IGG SERUM QUANT; Future  - IGM SERUM QUANT; Future  - Quantiferon Gold TB (PPD); Future  - SRINATH VIRUS PCR; Future  - VARICELLA ZOSTER ANTIBODIES (IGG + IGM); Future    We will consider PNA and flu shots at the time of the next visit.     He was advised in the past to follow up with PCP for PVCs.    Patient Instructions   Please research the following medications for MS:  Kesimpta  Ocrevus  Tysabri    Please take Keppra 2000 mg every 12 hours.     Please take Vimpat 50 mg twice daily.     Please seek emergent help if you experience any lightheadedness or changes in heart rhythm.     Please let our office know if you have any changes in your seizure frequency and/characteristics. Otherwise, please keep the diary of your events and bring it with you at the time of your next follow up visit with our office.     Please take vitamin D3 5000 internation units daily.     Please note that the following might precipitate seizures: missed doses of antiseizure medications, being sick with fever, stress, fatigue, sleep deprivation, not eating regularly, not drinking enough water, drinking too much alcohol, stopping alcohol suddenly if you are currently using it on a regular/daily basis, and/or using recreational drugs, among others.    Please note that the following might lead to an injury, potentially a life-threatening injury, in case you have a seizure and/or lose awareness while:   - being in a large body of water by yourself, such as bath, pool, lake, ocean, among others (risk of drowning)   - being on unprotected heights (risk of  fall)   - being around and/or operating heavy machinery (risk of injury)   - being around open fire/hot surfaces (risk of burns)   - any other activities/circumstances, in which if you lose awareness, you might injure yourself and/or others.    Please call for help (crisis line and/or 911) in case you have thoughts of harming yourself and/or others.    Please stop driving if you experience any changes/loss of awareness and/or seizures.     ------------------------------------------------------------------------------------------  Instructions for your family/caregivers:  Please call 911 if the patient has a seizure longer than 2-3 minutes, if seizures are back to back without him recovering to his baseline, or he does not start recovering within 5-10 minutes after the seizure stops. During the seizure - please turn him on his side, please make sure his head is protected (for example, you should put a pillow under his head, if one is available), and please do not put anything in his mouth.   -------------------------------------------------------------------------------------------    It is important that your seizures are well controlled and you have none or have them rarely. In addition to avoiding injury related to breakthrough seizures, frequent seizures increase risk of SUDEP (sudden unexpected death in epilepsy), where a person goes into a seizure and then never wakes up - this is a rare complication of seizure disorder; one of the best available ways to prevent it is to control your seizures well.     Due to the high volume of patients we are trying to help, your physician will not be able to respond by phone or in MyChart to your routine concerns between appointments.  This does not reflect a lack of interest or concern for you or your diagnosis.  Please bring these questions and concerns to your appointment where your physician can answer.  Please relay more pressing concerns to our office, either via  [employer], or by phone; if not able to reach us please visit nearby Urgent Care Center or Emergency Department.  If any emergent medical needs, please seek emergent medical help and/or call 911.    Please note that we are not able to fill out paperwork that might be related to your work, utility company, disability, and/or driving, among others, in between the visits.  Please schedule a dedicated appointment to address your paperwork, so we can do that in a timely manner.  This is not due to lack of concern or interest for your disease-related work/administrative problems, but to make sure that we provide the best possible care and to fill out your paperwork in a correct and timely manner.    Thank you for entrusting your neurological care to [facility] and we look forward to continuing to serve you.    Follow up in 1 months.     My total time spent caring for the patient on the day of the encounter was 46 minutes.   This does not include time spent on separately billable procedures/tests.      Catracho Oshea MD  Outpatient Neurology   [facility]

## 2025-02-20 RX ORDER — ALBUTEROL SULFATE 90 UG/1
2 INHALANT RESPIRATORY (INHALATION) 4 TIMES DAILY PRN
Qty: 9 G | Refills: 0 | Status: SHIPPED | OUTPATIENT
Start: 2025-02-20

## 2025-03-11 ENCOUNTER — PATIENT MESSAGE (OUTPATIENT)
Dept: MEDICAL GROUP | Facility: MEDICAL CENTER | Age: 35
End: 2025-03-11

## 2025-03-11 DIAGNOSIS — R56.9 SEIZURE (HCC): ICD-10-CM

## 2025-03-18 RX ORDER — ALBUTEROL SULFATE 90 UG/1
2 INHALANT RESPIRATORY (INHALATION) 4 TIMES DAILY PRN
Qty: 9 G | Refills: 0 | Status: SHIPPED | OUTPATIENT
Start: 2025-03-18

## 2025-03-26 ENCOUNTER — APPOINTMENT (OUTPATIENT)
Dept: NEUROLOGY | Facility: MEDICAL CENTER | Age: 35
End: 2025-03-26
Attending: PSYCHIATRY & NEUROLOGY

## 2025-04-02 ENCOUNTER — HOSPITAL ENCOUNTER (OUTPATIENT)
Dept: LAB | Facility: MEDICAL CENTER | Age: 35
End: 2025-04-02
Attending: PSYCHIATRY & NEUROLOGY
Payer: COMMERCIAL

## 2025-04-02 DIAGNOSIS — G37.9 DEMYELINATING LESION (HCC): ICD-10-CM

## 2025-04-02 LAB
ALBUMIN SERPL BCP-MCNC: 4.2 G/DL (ref 3.2–4.9)
ALBUMIN/GLOB SERPL: 1.4 G/DL
ALP SERPL-CCNC: 107 U/L (ref 30–99)
ALT SERPL-CCNC: 33 U/L (ref 2–50)
ANION GAP SERPL CALC-SCNC: 11 MMOL/L (ref 7–16)
AST SERPL-CCNC: 21 U/L (ref 12–45)
BASOPHILS # BLD AUTO: 1.2 % (ref 0–1.8)
BASOPHILS # BLD: 0.09 K/UL (ref 0–0.12)
BILIRUB SERPL-MCNC: 0.5 MG/DL (ref 0.1–1.5)
BUN SERPL-MCNC: 12 MG/DL (ref 8–22)
CALCIUM ALBUM COR SERPL-MCNC: 9.5 MG/DL (ref 8.5–10.5)
CALCIUM SERPL-MCNC: 9.7 MG/DL (ref 8.5–10.5)
CHLORIDE SERPL-SCNC: 102 MMOL/L (ref 96–112)
CO2 SERPL-SCNC: 23 MMOL/L (ref 20–33)
CREAT SERPL-MCNC: 0.86 MG/DL (ref 0.5–1.4)
EOSINOPHIL # BLD AUTO: 0.22 K/UL (ref 0–0.51)
EOSINOPHIL NFR BLD: 2.8 % (ref 0–6.9)
ERYTHROCYTE [DISTWIDTH] IN BLOOD BY AUTOMATED COUNT: 45 FL (ref 35.9–50)
GFR SERPLBLD CREATININE-BSD FMLA CKD-EPI: 116 ML/MIN/1.73 M 2
GLOBULIN SER CALC-MCNC: 3 G/DL (ref 1.9–3.5)
GLUCOSE SERPL-MCNC: 172 MG/DL (ref 65–99)
HBV CORE AB SERPL QL IA: NONREACTIVE
HBV SURFACE AB SERPL IA-ACNC: <3.5 MIU/ML (ref 0–10)
HBV SURFACE AG SER QL: NORMAL
HCT VFR BLD AUTO: 51.1 % (ref 42–52)
HCV AB SER QL: NORMAL
HGB BLD-MCNC: 17.8 G/DL (ref 14–18)
IMM GRANULOCYTES # BLD AUTO: 0.02 K/UL (ref 0–0.11)
IMM GRANULOCYTES NFR BLD AUTO: 0.3 % (ref 0–0.9)
LYMPHOCYTES # BLD AUTO: 3.02 K/UL (ref 1–4.8)
LYMPHOCYTES NFR BLD: 39 % (ref 22–41)
MCH RBC QN AUTO: 32.4 PG (ref 27–33)
MCHC RBC AUTO-ENTMCNC: 34.8 G/DL (ref 32.3–36.5)
MCV RBC AUTO: 92.9 FL (ref 81.4–97.8)
MONOCYTES # BLD AUTO: 0.56 K/UL (ref 0–0.85)
MONOCYTES NFR BLD AUTO: 7.2 % (ref 0–13.4)
NEUTROPHILS # BLD AUTO: 3.83 K/UL (ref 1.82–7.42)
NEUTROPHILS NFR BLD: 49.5 % (ref 44–72)
NRBC # BLD AUTO: 0 K/UL
NRBC BLD-RTO: 0 /100 WBC (ref 0–0.2)
PLATELET # BLD AUTO: 236 K/UL (ref 164–446)
PMV BLD AUTO: 11 FL (ref 9–12.9)
POTASSIUM SERPL-SCNC: 4.2 MMOL/L (ref 3.6–5.5)
PROT SERPL-MCNC: 7.2 G/DL (ref 6–8.2)
RBC # BLD AUTO: 5.5 M/UL (ref 4.7–6.1)
SODIUM SERPL-SCNC: 136 MMOL/L (ref 135–145)
WBC # BLD AUTO: 7.7 K/UL (ref 4.8–10.8)

## 2025-04-02 PROCEDURE — 86787 VARICELLA-ZOSTER ANTIBODY: CPT

## 2025-04-02 PROCEDURE — 86704 HEP B CORE ANTIBODY TOTAL: CPT

## 2025-04-02 PROCEDURE — 82784 ASSAY IGA/IGD/IGG/IGM EACH: CPT

## 2025-04-02 PROCEDURE — 86480 TB TEST CELL IMMUN MEASURE: CPT

## 2025-04-02 PROCEDURE — 87340 HEPATITIS B SURFACE AG IA: CPT

## 2025-04-02 PROCEDURE — 36415 COLL VENOUS BLD VENIPUNCTURE: CPT

## 2025-04-02 PROCEDURE — 86803 HEPATITIS C AB TEST: CPT

## 2025-04-02 PROCEDURE — 85025 COMPLETE CBC W/AUTO DIFF WBC: CPT

## 2025-04-02 PROCEDURE — 86706 HEP B SURFACE ANTIBODY: CPT

## 2025-04-02 PROCEDURE — 87798 DETECT AGENT NOS DNA AMP: CPT

## 2025-04-02 PROCEDURE — 80053 COMPREHEN METABOLIC PANEL: CPT

## 2025-04-04 ENCOUNTER — OFFICE VISIT (OUTPATIENT)
Dept: NEUROLOGY | Facility: MEDICAL CENTER | Age: 35
End: 2025-04-04
Attending: PSYCHIATRY & NEUROLOGY
Payer: COMMERCIAL

## 2025-04-04 VITALS
HEIGHT: 72 IN | RESPIRATION RATE: 16 BRPM | HEART RATE: 100 BPM | DIASTOLIC BLOOD PRESSURE: 68 MMHG | TEMPERATURE: 97.6 F | SYSTOLIC BLOOD PRESSURE: 110 MMHG | WEIGHT: 255.95 LBS | OXYGEN SATURATION: 91 % | BODY MASS INDEX: 34.67 KG/M2

## 2025-04-04 DIAGNOSIS — G35 MULTIPLE SCLEROSIS (HCC): ICD-10-CM

## 2025-04-04 DIAGNOSIS — R56.9 SEIZURE (HCC): ICD-10-CM

## 2025-04-04 LAB
GAMMA INTERFERON BACKGROUND BLD IA-ACNC: 0.04 IU/ML
M TB IFN-G BLD-IMP: POSITIVE
M TB IFN-G CD4+ BCKGRND COR BLD-ACNC: 0.47 IU/ML
MITOGEN IGNF BCKGRD COR BLD-ACNC: >10 IU/ML
QFT TB2 - NIL TBQ2: 0.73 IU/ML
VZV IGG SER IA-ACNC: 24.6 S/CO
VZV IGM SER IA-ACNC: 0.31 ISR

## 2025-04-04 PROCEDURE — 99214 OFFICE O/P EST MOD 30 MIN: CPT | Performed by: PSYCHIATRY & NEUROLOGY

## 2025-04-04 PROCEDURE — 3078F DIAST BP <80 MM HG: CPT | Performed by: PSYCHIATRY & NEUROLOGY

## 2025-04-04 PROCEDURE — 3074F SYST BP LT 130 MM HG: CPT | Performed by: PSYCHIATRY & NEUROLOGY

## 2025-04-04 PROCEDURE — 99211 OFF/OP EST MAY X REQ PHY/QHP: CPT | Performed by: PSYCHIATRY & NEUROLOGY

## 2025-04-04 RX ORDER — LEVETIRACETAM 1000 MG/1
2000 TABLET ORAL 2 TIMES DAILY
Qty: 120 TABLET | Refills: 5 | Status: SHIPPED | OUTPATIENT
Start: 2025-04-04

## 2025-04-04 RX ORDER — LACOSAMIDE 50 MG/1
50 TABLET ORAL 2 TIMES DAILY
Qty: 60 TABLET | Refills: 5 | Status: SHIPPED | OUTPATIENT
Start: 2025-04-04 | End: 2025-10-01

## 2025-04-04 ASSESSMENT — FIBROSIS 4 INDEX: FIB4 SCORE: 0.54

## 2025-04-04 ASSESSMENT — PATIENT HEALTH QUESTIONNAIRE - PHQ9: CLINICAL INTERPRETATION OF PHQ2 SCORE: 0

## 2025-04-04 NOTE — PATIENT INSTRUCTIONS
Please research the following medications for MS:  Kesimpta  Ocrevus  Tysabri    Please take Keppra 2000 mg every 12 hours.     Please take Vimpat 50 mg twice daily.     Please seek emergent help if you experience any lightheadedness or changes in heart rhythm.     Please let our office know if you have any changes in your seizure frequency and/characteristics. Otherwise, please keep the diary of your events and bring it with you at the time of your next follow up visit with our office.     Please take vitamin D3 5000 internation units daily.     Please note that the following might precipitate seizures: missed doses of antiseizure medications, being sick with fever, stress, fatigue, sleep deprivation, not eating regularly, not drinking enough water, drinking too much alcohol, stopping alcohol suddenly if you are currently using it on a regular/daily basis, and/or using recreational drugs, among others.    Please note that the following might lead to an injury, potentially a life-threatening injury, in case you have a seizure and/or lose awareness while:   - being in a large body of water by yourself, such as bath, pool, lake, ocean, among others (risk of drowning)   - being on unprotected heights (risk of fall)   - being around and/or operating heavy machinery (risk of injury)   - being around open fire/hot surfaces (risk of burns)   - any other activities/circumstances, in which if you lose awareness, you might injure yourself and/or others.    Please call for help (crisis line and/or 911) in case you have thoughts of harming yourself and/or others.    Please stop driving if you experience any changes/loss of awareness and/or seizures.     ------------------------------------------------------------------------------------------  Instructions for your family/caregivers:  Please call 911 if the patient has a seizure longer than 2-3 minutes, if seizures are back to back without him recovering to his baseline, or he  does not start recovering within 5-10 minutes after the seizure stops. During the seizure - please turn him on his side, please make sure his head is protected (for example, you should put a pillow under his head, if one is available), and please do not put anything in his mouth.   -------------------------------------------------------------------------------------------    It is important that your seizures are well controlled and you have none or have them rarely. In addition to avoiding injury related to breakthrough seizures, frequent seizures increase risk of SUDEP (sudden unexpected death in epilepsy), where a person goes into a seizure and then never wakes up - this is a rare complication of seizure disorder; one of the best available ways to prevent it is to control your seizures well.     Due to the high volume of patients we are trying to help, your physician will not be able to respond by phone or in [employer] to your routine concerns between appointments.  This does not reflect a lack of interest or concern for you or your diagnosis.  Please bring these questions and concerns to your appointment where your physician can answer.  Please relay more pressing concerns to our office, either via [facility], or by phone; if not able to reach us please visit nearby Urgent Care Center or Emergency Department.  If any emergent medical needs, please seek emergent medical help and/or call 911.    Please note that we are not able to fill out paperwork that might be related to your work, utility company, disability, and/or driving, among others, in between the visits.  Please schedule a dedicated appointment to address your paperwork, so we can do that in a timely manner.  This is not due to lack of concern or interest for your disease-related work/administrative problems, but to make sure that we provide the best possible care and to fill out your paperwork in a correct and timely manner.    Thank you for entrusting your  neurological care to [facility] and we look forward to continuing to serve you.

## 2025-04-04 NOTE — PROGRESS NOTES
[facility]  Follow Up Visit      Patient's Name: Rafi Zapata Jr.  YOB: 1990  MRN: [id number]  Date of Service: 04/04/25.    Referring Provider: Alfredito Valdivia M.D.  [address] [place] [address] Emergency Room  Z  [place] [id number]    Chief Concern: Seizures. Multiple Sclerosis.     The patient presents for a follow up. The patient presents with his wife and provides verbal consent for her to be present and provide additional information as needed.     HPI (as obtained at the time of the initial visit and updated as necessary): The patient is a 35 y.o., right-handed male, who initially presented to my epilepsy clinic for evaluation of seizures on 08/28/2023.    The patient had his first known seizure on 08/13/2023, as noted under event type #1. He his second similar event on 08/24/2023, and was started on Keppra after the second event.    He never had isolated staring spells. No history of oral/manual automatisms. No history of gastric uprising. No autonomic phenomena. No myoclonus. No nocturnal seizures as of the time of the initial visit with me.     He has metallic/blood taste after eating chips.     He is on Keppra and has no adverse effects from it, specifically, no mood issues; there was fatigue at the start of Keppra, but this got better.     He has asthma is overall well controlled at this time.    He used to smoke marijuana heavily and stopped on 08/13/2023.     The patient used to smoke high potency marijuana, energy drinks, and was on Abx, and in that context, had an episode of bilateral body numbness that lasted for an hour and did not come back after he stopped drinking energy drinks.     The patient experienced what appears to be his first symptoms of multiple sclerosis in January 2025.  The symptoms were described as left leg tingling followed by left leg numbness lasting for several days.  He had no other symptoms at that time.    The patient had an episode of  "dizziness of 02/12/2025, which might have been another symptom of MS, but it fully resolved by the next day.     As of April 2025, except above-noted transient left leg numbness/tingling, as well as an episode of dizziness, the patient does not have significant fatigue, no muscle weakness anywhere, no trouble with balance, no changes in vision, no problems with walking, and no neuropathic pain.  He did not express any concerns about bowel/bladder challenges.    Semiology:  Event type #1: \"Seizures\".  Year/Age of Onset: August 2023.  Initial features: Lightheadedness that started on top of his head and his head felt cold. He at times experiences karlee vu for several hours prior to the event. Smelling a strange smell (smoke-like).   Event features: Stared off, loses awareness, and turned his head to the right. This is followed by a bilateral tonic-clonic movements. He has no recollection for the time during the event and is he responsive during the event. No tongue bite. No bladder/bowel incontinence.  Post-ictal features: Feels very confused and sore after the event.   Duration: Around 1 minute.   Frequency: The first event was on 08/13/2023. The next event was on 08/24/2023. The next event was on 09/14/2023. The next event was on 05/15/2024 and continued to have seizures through the summer of 2024. The last event was on 08/10/2024.   Precipitating factors: Stress. Heat. Exposure to gun cleaning solvent (this was 3 hours prior to his initial seizure).     Event type #2: \"Spells\".  Year/Age of Onset: August 2023.  Initial features: Sensation of weird smell. Feeling lightheaded.   Event features: No confusion nor loss of awareness. He tries to focus on different things while experiencing above.   Post-ictal features: Back to baseline.  Duration: 1-2 minutes.  Frequency: At times in clusters (up to 5 per night). Events during the summer of 2024. None since July 2024.    Precipitating factors: Not clear.     History of " "status epilepticus: no  History of physical injury related to seizures: yes  History of surgery related to epilepsy: no  Family Planning: N/A  Current Driving Status: Not driving at this time.   Seizure Clusters: No   Longest Seizure Freedom: Seizure free since 08/10/2024    Pertinent Ancillary Test Results:    MRI brain studies:   - MRI brain (09/10/2023  at [facility]): \"1.  MRI of the brain without and with contrast within normal limits. 2.  Scattered paranasal sinus disease.\" I reviewed key images with the patient on 09/26/2023.     - MRI brain w/wo contrast (10/26/2024, [facility]): \"IMPRESSION: 1.  There are small periventricular T2 hyperintensities noted adjacent to the atrium of the right lateral ventricle suspicious for chronic demyelinating areas. These findings are new since the previous study. There are no enhancing lesions. There are no posterior fossa lesions. These findings does not fulfill the Powell MR criteria for diagnosing demyelinating plaques of multiple sclerosis. Please correlate with clinical and other laboratory evidence. 2.  In view of history of seizure, there is no MR evidence of cortical dysplasia,  neuronal migrational abnormality or hippocampal sclerosis.\" I reviewed images with the patient on 11/13/2024.      - MRI c-spine w/wo contrast (01/23/2025): \"IMPRESSION: 1.  Focal, well-circumscribed T2 signal abnormality likely representing a demyelinating lesion noted in the right lateral hemicord at the C5 level with mild enhancement.\" I reviewed images on 02/10/2025.     - MRI t-spine w/wo contrast (01/23/2025): \"MRI of the thoracic spine without and with contrast within normal limits. \" I reviewed images on 02/10/2025.    CT head studies:   - CT head wo contrast (08/14/2023 at [facility]): \"No acute intracranial abnormality. Paranasal sinusitis.\" I reviewed the images on 08/28/2023.     EEG studies:   - Standard EEG - none available for my review.      - 23-hour ambulatory EEG (10/24/2023, Dr." Dayo): This was, within the above noted limitations, a normal ambulatory EEG recording in the awake and drowsy/sleep state(s):  -No regional slowing or persistent focal asymmetries were seen.  -No epileptiform discharges or other epileptiform phenomena seen.  -No seizures. Clinical correlation is recommended.  -Clinical Events: None.  -Single lead EKG showed occasional PVCs - please correlate clinically.      - 1-day ambulatory EEG (10/29/2024, Dr. Oshea): This was an abnormal ambulatory EEG recording in the awake and drowsy/sleep state(s):  The presence of occasional to frequent, left temporal, maximal over the F7 contact, epileptiform discharges, points toward increased propensity toward focal seizures arising from that region.   No definitive seizures.   Clinical Events: None.        INTERIM HISTORY (04/04/2025): The patient had no events suspicious for seizures in the meantime.  He is taking his antiseizure medications regularly, and tolerates them well.    The patient had an episode of dizziness lasting the whole day on 2/12/2025, which resolved completely the next day.  No other episodes suspicious for MS flareups.    The patient is still to research the medications for MS, but he has completed his blood work.    Current Antiseizure Medications: Keppra 2000 mg BID. Vimpat 50 mg BID.     Previously Tried Antiseizure Medications: None.     Review of Systems: No recent fevers. He lost 3 lbs in the interim. The mood is overall stable. No SI/HI.     Risk Factors For Epilepsy/Seizure Disorder: The patient is a product of normal pregnancy and uncomplicated delivery. The early development was normal and the patient started waking, talking, and engaging in social interaction as expected. There were no challenges during school and no reported attendance of special education programs. There is no history of febrile seizures. There is no history of head trauma. There is no history of stroke. There is no history  of CNS infections, such as encephalitis and/or meningitis. There is no history of neurosurgical interventions. There is no reported history of staring spells during childhood/school years.    Past Medical History:  Past Medical History:   Diagnosis Date    ASTHMA     Seizure disorder (HCC)      Past Surgical History:  Ear surgery as child. Right leg fracture (not sure if there was a surgical repair).     Social History:  Social History     Tobacco Use    Smoking status: Every Day     Current packs/day: 1.00     Average packs/day: 1 pack/day for 15.0 years (15.0 ttl pk-yrs)     Types: Cigarettes    Smokeless tobacco: Never   Vaping Use    Vaping status: Never Used   Substance Use Topics    Alcohol use: No    Drug use: Not Currently     Types: Inhaled, Marijuana     Comment: not currently     Family History:  There is no known family history of seizures/epilepsy.        4/4/2025    11:30 AM 2/10/2025     3:00 PM 11/13/2024     3:30 PM 10/14/2024    11:30 AM 8/19/2024     4:30 PM   PHQ-9 Screening   Little interest or pleasure in doing things 0 - not at all 0 - not at all 0 - not at all 0 - not at all 0 - not at all   Feeling down, depressed, or hopeless 0 - not at all 0 - not at all 0 - not at all 0 - not at all 0 - not at all   PHQ-2 Total Score 0 0 0 0 0     Lewis Suicide Reassessment  New or continued thoughts about killing self?: No  Preparing to end life?: No    Allergies:  No Known Allergies    Current Medications:    Current Outpatient Medications:     levetiracetam, 2,000 mg, Oral, BID, Taking    lacosamide, 50 mg, Oral, BID, Taking    albuterol, 2 Puff, Inhalation, 4X/DAY PRN, PRN    fluticasone, 2 Spray, Nasal, DAILY, PRN    varenicline, 1 mg, Oral, BID (Patient not taking: Reported on 4/4/2025), Not Taking    vitamin D3, 5,000 Units, Oral, DAILY    Physical Examination:    Ambulatory Vitals  Encounter Vitals  Temperature: 36.4 °C (97.6 °F)  Temp src: Temporal  Blood Pressure: 110/68  Pulse:  100  Respiration: 16  Pulse Oximetry: 91 %  Weight: 116 kg (255 lb 15.3 oz)  Height: 182.9 cm (6')  BMI (Calculated): 34.71    Neurological Examination:  Mental Status: The patient is alert and oriented to person, place, time, and situation. Speech is fluent, with no aphasia nor dysarthria noted. Affect is normal.    Cranial Nerve Examination:  CN I: Olfaction examination is deferred.  CN II: Visual fields are full to confrontation examination and show no visual field defect.   CN III, IV, VI: Eye movements are normal in all directions. Pupils are reactive to direct and consensual light. There is no relative afferent pupillary defect. There is no nystagmus.  CN V: Facial sensation to light touch is intact throughout.   CN VII: No significant facial muscle or other soft tissue asymmetry.  CN VIII: Hearing intact to rubbing sounds bilaterally.   CN IX, X: Soft palate elevates symmetrically.  CN XI: Symmetrical shoulder shrug exam.  CN XII: Midline tongue protrusion and moves symmetrically to each side.     Motor Examination:  Muscle strength is intact throughout. Muscle tone is normal throughout. No abnormal movements are observed. No pronator drift is noted.     Muscle Stretch Reflexes Examination:  Deferred.     Sensory Examination:  Preserved sensation to light touch in all extremities.     Coordination:  Normal finger to nose testing bilaterally, no postural nor intentional tremor was noted.     Stance/gait:  Normal regular gait with normal arm swings and stride length. Able to perform tandem gait. Romberg sign is absent.     Labs:   - Keppra (09/26/2023): 13   - Keppra (02/12/2024): 16     - Hep C Ab (04/02/2025): Non-reactive.    - Hep Bs Ab (04/02/2025): Non-reactive.    - Hep Bc Ab (04/02/2025): Non-reactive.    - Hep Bs Ag (04/02/2025): Non-reactive.      - IgA (04/02/2025): 376   - IgG (04/02/2025): 895   - IgM (04/02/2025): 109     - VZV Antibodies (04/02/2025): IgM negative, IgG postiive   - SRINATH Virus PCR  (04/02/2025): Not detected.   - Quantiferon Gold TB (04/02/2025): Positive     - CMP (04/02/2025): Elevated glucose and mildly elevatedAlkPhos   - CBC (04/02/2025): Normal.     ASSESSMENT AND PLAN:  1. 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  The presence of small periventricular T2 hyperintensities noted adjacent to the atrium of the right lateral ventricle on MRI brain done in October 2024 (new compared to MRI brain done in 2023) was suspicious for chronic demyelinating area and might be related to his new onset focal epilepsy in 2023 (he now has an established diagnosis of MS).  Ambulatory EEG done in October 2024 showed occasional to frequent left anterior temporal discharges.  In that context, the patient has a diagnosis consistent with focal epilepsy, lateralizing to the left, localizing to the left, most likely mesial, temporal lobe.    The patient remains seizure-free on current antiseizure medication regimen.  Adverse effects were briefly discussed.  Refills were provided.   - levetiracetam (KEPPRA) 1000 MG tablet; Take 2 Tablets by mouth 2 times a day.  Dispense: 120 Tablet; Refill: 5  - lacosamide (VIMPAT) 50 MG Tab tablet; Take 1 Tablet by mouth 2 times a day for 180 days.  Dispense: 60 Tablet; Refill: 5    Since the patient has been seizure-free for more than 3 months now, he may continue to drive.    We will defer repeating MRI brain/EEG at this time, from seizure perspective.     Epilepsy counseling provided in writing.    2. Demyelinating lesion (HCC)/Multiple sclerosis.   There are small periventricular T2 hyperintensities noted adjacent to the atrium of the right lateral ventricle suspicious for chronic demyelinating areas. This is a new change noted on MRI brain in October 2024, when compared to MRI brain done in September 2023.  The patient experienced a left leg tingling, followed by numbness for several days in January 2025.  This, along with the findings on MRI  cervical spine in January 2025, consistent with subacute demyelinating lesion at C5 level (focal, well-circumscribed T2 signal abnormality likely representing a demyelinating lesion noted in the right lateral hemicord at the C5 level with mild enhancement), establishes the diagnosis of multiple sclerosis.  The patient expressed a wish to defer CSF workup at this time, as per early 2025 visit.     We will obtain tests to rule out MOGAD/NMOSD:  - CNS DEMYELINATING DISEASE RFLX PANEL; Future [not done]  - Aquaporin-4 Receptor IgG, Serum, rflx Titer; Future [not done]    The patient was again advised to research Kesimpta, Ocrevus, and Tysabri, and we will avoid sphingosine-1-phosphate receptor (S1PR) modulators at this time due to the history of heart rhythm changes. In preparation for MS DMT, we obtained the blood work.   - the blood work was reviewed, as documented above.    - the patient was advised to work with PCP on further diagnostic tests for TB (via [employer] - many of the test results were not visible to me at the time of the visit)    - the patient was advised to consider Hep B vaccination    - the patient was advised to check fasting serum glucose    He was advised in the past to follow up with PCP for PVCs.    Patient Instructions   Please research the following medications for MS:  Kesimpta  Ocrevus  Tysabri    Please take Keppra 2000 mg every 12 hours.     Please take Vimpat 50 mg twice daily.     Please seek emergent help if you experience any lightheadedness or changes in heart rhythm.     Please let our office know if you have any changes in your seizure frequency and/characteristics. Otherwise, please keep the diary of your events and bring it with you at the time of your next follow up visit with our office.     Please take vitamin D3 5000 internation units daily.     Please note that the following might precipitate seizures: missed doses of antiseizure medications, being sick with fever, stress,  fatigue, sleep deprivation, not eating regularly, not drinking enough water, drinking too much alcohol, stopping alcohol suddenly if you are currently using it on a regular/daily basis, and/or using recreational drugs, among others.    Please note that the following might lead to an injury, potentially a life-threatening injury, in case you have a seizure and/or lose awareness while:   - being in a large body of water by yourself, such as bath, pool, lake, ocean, among others (risk of drowning)   - being on unprotected heights (risk of fall)   - being around and/or operating heavy machinery (risk of injury)   - being around open fire/hot surfaces (risk of burns)   - any other activities/circumstances, in which if you lose awareness, you might injure yourself and/or others.    Please call for help (crisis line and/or 911) in case you have thoughts of harming yourself and/or others.    Please stop driving if you experience any changes/loss of awareness and/or seizures.     ------------------------------------------------------------------------------------------  Instructions for your family/caregivers:  Please call 911 if the patient has a seizure longer than 2-3 minutes, if seizures are back to back without him recovering to his baseline, or he does not start recovering within 5-10 minutes after the seizure stops. During the seizure - please turn him on his side, please make sure his head is protected (for example, you should put a pillow under his head, if one is available), and please do not put anything in his mouth.   -------------------------------------------------------------------------------------------    It is important that your seizures are well controlled and you have none or have them rarely. In addition to avoiding injury related to breakthrough seizures, frequent seizures increase risk of SUDEP (sudden unexpected death in epilepsy), where a person goes into a seizure and then never wakes up - this  is a rare complication of seizure disorder; one of the best available ways to prevent it is to control your seizures well.     Due to the high volume of patients we are trying to help, your physician will not be able to respond by phone or in MyChart to your routine concerns between appointments.  This does not reflect a lack of interest or concern for you or your diagnosis.  Please bring these questions and concerns to your appointment where your physician can answer.  Please relay more pressing concerns to our office, either via MyChart, or by phone; if not able to reach us please visit nearby Urgent Care Center or Emergency Department.  If any emergent medical needs, please seek emergent medical help and/or call 911.    Please note that we are not able to fill out paperwork that might be related to your work, utility company, disability, and/or driving, among others, in between the visits.  Please schedule a dedicated appointment to address your paperwork, so we can do that in a timely manner.  This is not due to lack of concern or interest for your disease-related work/administrative problems, but to make sure that we provide the best possible care and to fill out your paperwork in a correct and timely manner.    Thank you for entrusting your neurological care to [facility] and we look forward to continuing to serve you.    Follow up in 1.5 months.     Catracho Oshea MD  Outpatient Neurology   [facility]

## 2025-04-05 LAB
JC VIRUS SOURCE  Q4278: NORMAL
JCPYV DNA SERPL QL NAA+PROBE: NOT DETECTED

## 2025-04-06 LAB
IGA SERPL-MCNC: 376 MG/DL (ref 68–408)
IGG SERPL-MCNC: 895 MG/DL (ref 768–1632)
IGM SERPL-MCNC: 109 MG/DL (ref 35–263)

## 2025-04-09 ENCOUNTER — PATIENT MESSAGE (OUTPATIENT)
Dept: MEDICAL GROUP | Facility: MEDICAL CENTER | Age: 35
End: 2025-04-09
Payer: COMMERCIAL

## 2025-04-09 RX ORDER — ALBUTEROL SULFATE 90 UG/1
2 INHALANT RESPIRATORY (INHALATION) 4 TIMES DAILY PRN
Qty: 9 G | Refills: 0 | Status: SHIPPED | OUTPATIENT
Start: 2025-04-09 | End: 2025-04-24 | Stop reason: SDUPTHER

## 2025-04-14 ENCOUNTER — OFFICE VISIT (OUTPATIENT)
Dept: MEDICAL GROUP | Facility: MEDICAL CENTER | Age: 35
End: 2025-04-14
Payer: COMMERCIAL

## 2025-04-14 VITALS
HEART RATE: 97 BPM | TEMPERATURE: 98.2 F | OXYGEN SATURATION: 95 % | WEIGHT: 253.53 LBS | HEIGHT: 71 IN | DIASTOLIC BLOOD PRESSURE: 72 MMHG | SYSTOLIC BLOOD PRESSURE: 132 MMHG | BODY MASS INDEX: 35.49 KG/M2

## 2025-04-14 DIAGNOSIS — R73.9 ELEVATED BLOOD SUGAR LEVEL: ICD-10-CM

## 2025-04-14 DIAGNOSIS — R76.12 POSITIVE QUANTIFERON-TB GOLD TEST: ICD-10-CM

## 2025-04-14 PROCEDURE — 3078F DIAST BP <80 MM HG: CPT | Performed by: STUDENT IN AN ORGANIZED HEALTH CARE EDUCATION/TRAINING PROGRAM

## 2025-04-14 PROCEDURE — 3075F SYST BP GE 130 - 139MM HG: CPT | Performed by: STUDENT IN AN ORGANIZED HEALTH CARE EDUCATION/TRAINING PROGRAM

## 2025-04-14 PROCEDURE — 99214 OFFICE O/P EST MOD 30 MIN: CPT | Performed by: STUDENT IN AN ORGANIZED HEALTH CARE EDUCATION/TRAINING PROGRAM

## 2025-04-14 ASSESSMENT — ENCOUNTER SYMPTOMS
PALPITATIONS: 0
FEVER: 0
SHORTNESS OF BREATH: 0
HEMOPTYSIS: 0
CHILLS: 0

## 2025-04-14 ASSESSMENT — FIBROSIS 4 INDEX: FIB4 SCORE: 0.54

## 2025-04-14 NOTE — PROGRESS NOTES
"Verbal consent was acquired by the patient to use [employer] ambient listening note generation during this visit     Subjective:     HPI:   History of Present Illness  The patient presents for evaluation of a positive QuantiFERON test and elevated blood glucose levels.    He has no known history of tuberculosis exposure. During his employment at [employer], she underwent annual TB testing, all of which yielded negative results. He reports an intermittent cough, attributing it to his asthma and smoking habits. He does not experience hemoptysis or unintentional weight loss. She has increased her physical activity, specifically walking, and reports no night sweats. His travel history is limited to [place], with no international travel in recent years.    He  has not been previously diagnosed with diabetes but suspects its presence due to dietary indiscretions. Her recent blood glucose test was non-fasting, and she consumed coffee with sugar prior to the test.     Supplemental Information  He has multiple sclerosis and is not currently on any immunosuppressants or monoclonal antibodies. He has not received these treatments in the past. He is on Keppra and Vimpat.    Objective:     Exam:  /72 (BP Location: Left arm, Patient Position: Sitting, BP Cuff Size: Adult)   Pulse 97   Temp 36.8 °C (98.2 °F) (Temporal)   Ht 1.803 m (5' 11\")   Wt 115 kg (253 lb 8.5 oz)   SpO2 95%   BMI 35.36 kg/m²  Body mass index is 35.36 kg/m².    Review of Systems   Constitutional:  Negative for chills and fever.   Respiratory:  Negative for hemoptysis and shortness of breath.    Cardiovascular:  Negative for chest pain and palpitations.       Physical Exam  Constitutional:       Appearance: Normal appearance.   Cardiovascular:      Rate and Rhythm: Normal rate and regular rhythm.      Pulses: Normal pulses.      Heart sounds: Normal heart sounds. No murmur heard.  Pulmonary:      Effort: Pulmonary effort is normal. " No respiratory distress.      Breath sounds: Normal breath sounds. No wheezing.   Neurological:      General: No focal deficit present.      Mental Status: He is alert and oriented to person, place, and time.           Lab studies     Latest Reference Range & Units 04/02/25 13:47   Glucose 65 - 99 mg/dL 172 (H)   (H): Data is abnormally high     Latest Reference Range & Units 04/02/25 13:47   QFT Gold Plus Negative  Positive !   QFT Mitogen - NIL IU/mL >10.00   QFT NIL IU/mL 0.04   QFT TB1 - NIL <=0.34 IU/mL 0.47 (H)   QFT TB2 - NIL <=0.34 IU/mL 0.73 (H)   !: Data is abnormal  (H): Data is abnormally high    Assessment & Plan:     Assessment & Plan      Problem List Items Addressed This Visit       Positive QuantiFERON-TB Gold test     A chest x-ray will be ordered to rule out active tuberculosis.         Relevant Orders    DX-CHEST-2 VIEWS    Elevated blood sugar level    An A1c test will be conducted to assess for diabetes         Relevant Orders    HEMOGLOBIN A1C           Return in about 2 weeks (around 4/28/2025).    Please note that this dictation was created using voice recognition software. I have made every reasonable attempt to correct obvious errors, but I expect that there are errors of grammar and possibly content that I did not discover before finalizing the note.

## 2025-04-16 ENCOUNTER — HOSPITAL ENCOUNTER (OUTPATIENT)
Dept: LAB | Facility: MEDICAL CENTER | Age: 35
End: 2025-04-16
Attending: STUDENT IN AN ORGANIZED HEALTH CARE EDUCATION/TRAINING PROGRAM
Payer: COMMERCIAL

## 2025-04-16 ENCOUNTER — HOSPITAL ENCOUNTER (OUTPATIENT)
Dept: RADIOLOGY | Facility: MEDICAL CENTER | Age: 35
End: 2025-04-16
Attending: STUDENT IN AN ORGANIZED HEALTH CARE EDUCATION/TRAINING PROGRAM
Payer: COMMERCIAL

## 2025-04-16 DIAGNOSIS — R73.9 ELEVATED BLOOD SUGAR LEVEL: ICD-10-CM

## 2025-04-16 DIAGNOSIS — R76.12 POSITIVE QUANTIFERON-TB GOLD TEST: ICD-10-CM

## 2025-04-16 LAB
EST. AVERAGE GLUCOSE BLD GHB EST-MCNC: 223 MG/DL
HBA1C MFR BLD: 9.4 % (ref 4–5.6)

## 2025-04-16 PROCEDURE — 71046 X-RAY EXAM CHEST 2 VIEWS: CPT

## 2025-04-16 PROCEDURE — 36415 COLL VENOUS BLD VENIPUNCTURE: CPT

## 2025-04-16 PROCEDURE — 83036 HEMOGLOBIN GLYCOSYLATED A1C: CPT

## 2025-04-18 ENCOUNTER — RESULTS FOLLOW-UP (OUTPATIENT)
Dept: MEDICAL GROUP | Facility: MEDICAL CENTER | Age: 35
End: 2025-04-18

## 2025-04-24 ENCOUNTER — OFFICE VISIT (OUTPATIENT)
Dept: MEDICAL GROUP | Facility: MEDICAL CENTER | Age: 35
End: 2025-04-24
Payer: COMMERCIAL

## 2025-04-24 VITALS
WEIGHT: 256.8 LBS | BODY MASS INDEX: 35.95 KG/M2 | OXYGEN SATURATION: 95 % | SYSTOLIC BLOOD PRESSURE: 110 MMHG | HEART RATE: 95 BPM | HEIGHT: 71 IN | DIASTOLIC BLOOD PRESSURE: 62 MMHG | TEMPERATURE: 98.2 F

## 2025-04-24 DIAGNOSIS — Z22.7 LATENT TUBERCULOSIS: ICD-10-CM

## 2025-04-24 DIAGNOSIS — J45.20 MILD INTERMITTENT ASTHMA WITHOUT COMPLICATION: ICD-10-CM

## 2025-04-24 DIAGNOSIS — E11.9 TYPE 2 DIABETES MELLITUS WITHOUT COMPLICATION, WITHOUT LONG-TERM CURRENT USE OF INSULIN (HCC): ICD-10-CM

## 2025-04-24 PROCEDURE — 3074F SYST BP LT 130 MM HG: CPT | Performed by: STUDENT IN AN ORGANIZED HEALTH CARE EDUCATION/TRAINING PROGRAM

## 2025-04-24 PROCEDURE — 3078F DIAST BP <80 MM HG: CPT | Performed by: STUDENT IN AN ORGANIZED HEALTH CARE EDUCATION/TRAINING PROGRAM

## 2025-04-24 PROCEDURE — 99214 OFFICE O/P EST MOD 30 MIN: CPT | Performed by: STUDENT IN AN ORGANIZED HEALTH CARE EDUCATION/TRAINING PROGRAM

## 2025-04-24 RX ORDER — FLASH GLUCOSE SENSOR
1 KIT MISCELLANEOUS DAILY
Qty: 2 EACH | Refills: 0 | Status: SHIPPED | OUTPATIENT
Start: 2025-04-24 | End: 2025-05-24

## 2025-04-24 RX ORDER — ALBUTEROL SULFATE 90 UG/1
2 INHALANT RESPIRATORY (INHALATION) 4 TIMES DAILY PRN
Qty: 8 G | Refills: 0 | Status: SHIPPED | OUTPATIENT
Start: 2025-04-24 | End: 2025-04-25 | Stop reason: SDUPTHER

## 2025-04-24 ASSESSMENT — ENCOUNTER SYMPTOMS
CHILLS: 0
VOMITING: 0
COUGH: 0
DIARRHEA: 0
FEVER: 0
ABDOMINAL PAIN: 0
HEMOPTYSIS: 0
PALPITATIONS: 0

## 2025-04-24 ASSESSMENT — FIBROSIS 4 INDEX: FIB4 SCORE: 0.54

## 2025-04-24 NOTE — ASSESSMENT & PLAN NOTE
- His A1c level is elevated at 9.9  - He was informed about the potential complications of uncontrolled diabetes  - Discussed in detail regarding diet lifestyle modifications.  A referral for retinal screening was made. An order for a continuous glucose monitor was also placed.  - A prescription for metformin 500 mg twice daily was provided. The potential side effects of metformin were discussed.  His A1c level will be rechecked in 3 months.

## 2025-04-24 NOTE — PROGRESS NOTES
"Verbal consent was acquired by the patient to use [employer] ambient listening note generation during this visit     Subjective:     HPI:   History of Present Illness  The patient presents for evaluation of diabetes, asthma, and latent tuberculosis.     No recent symptoms of increased urination or thirst are reported. He discontinued the consumption of [employer] energy drinks, which he previously consumed in large quantities, leading to urination every 45 minutes. Since stopping these drinks, urination frequency has normalized. No other symptoms indicative of diabetes are reported. His wife, who is also diabetic, has been assisting him in dietary modifications. A family history of diabetes is noted, with both his father and uncle being affected. Occasional tingling in his feet was experienced in the past, attributed to the consumption of [employer] energy drinks, but this symptom has not recurred since discontinuing these beverages. He is capable of monitoring his blood glucose levels at home.    He reports losing his albuterol inhaler, which he typically uses upon waking due to his smoking habit. He was diagnosed with asthma following a pulmonary function test. Previously, he used Primatene Mist inhalers but discontinued their use after his neurologist identified them as a potential trigger for his seizures.    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 Latent tuberculosis is diagnosed, with no known prior exposure to tuberculosis.        FAMILY HISTORY  His wife has type 2 diabetes. His father had diabetes, and his uncle currently has diabetes.      Objective:     Exam:  /62   Pulse 95   Temp 36.8 °C (98.2 °F) (Temporal)   Ht 1.803 m (5' 11\")   Wt 116 kg (256 lb 12.8 oz)   SpO2 95%   BMI 35.82 kg/m²  Body mass index is 35.82 kg/m².    Review of Systems   Constitutional:  Negative for chills and fever.   Respiratory:  Negative for cough and hemoptysis.    Cardiovascular:  Negative for chest " pain and palpitations.   Gastrointestinal:  Negative for abdominal pain, diarrhea and vomiting.       Physical Exam  Constitutional:       Appearance: Normal appearance.   Cardiovascular:      Rate and Rhythm: Normal rate and regular rhythm.      Pulses: Normal pulses.      Heart sounds: Normal heart sounds. No murmur heard.  Pulmonary:      Effort: Pulmonary effort is normal. No respiratory distress.      Breath sounds: Normal breath sounds. No wheezing.   Neurological:      General: No focal deficit present.      Mental Status: He is alert and oriented to person, place, and time.       Monofilament testing with a 10 gram force: sensation intact: intact bilaterally  Visual Inspection: Feet without maceration, ulcers, fissures.  Pedal pulses: intact bilaterally      Lab studies    Lab Results   Component Value Date/Time    HBA1C 9.4 (H) 04/16/2025 10:41 AM          Assessment & Plan:     Assessment & Plan      Problem List Items Addressed This Visit       Mild intermittent asthma without complication    - Refill for albuterol was provided         Relevant Medications    albuterol 108 (90 Base) MCG/ACT Aero Soln inhalation aerosol    Type 2 diabetes mellitus without complication, without long-term current use of insulin ([facility])    - His A1c level is elevated at 9.9  - He was informed about the potential complications of uncontrolled diabetes  - Discussed in detail regarding diet lifestyle modifications.  A referral for retinal screening was made. An order for a continuous glucose monitor was also placed.  - A prescription for metformin 500 mg twice daily was provided. The potential side effects of metformin were discussed.  His A1c level will be rechecked in 3 months.         Relevant Medications    metFORMIN (GLUCOPHAGE) 500 MG Tab    Continuous Glucose Sensor (FREESTYLE OSMIN 14 DAY SENSOR) [facility]    Other Relevant Orders    Referral for Retinal Screening Exam    Diabetic Monofilament LE Exam (Completed)     MICROALBUMIN CREAT RATIO URINE    HEMOGLOBIN A1C    Latent tuberculosis    - His x-ray results were normal, indicating no active tuberculosis or lesions.   - A prescription for rifampin 600 mg daily was provided, with instructions to take it religiously for 4 months.   - He was advised to report any allergic reactions to rifampin promptly.  - Plan to report County for communicable disease  - A referral to infectious disease was made for further follow-up.           Relevant Orders    Referral to Infectious Disease           Return in about 3 months (around 7/24/2025).    Please note that this dictation was created using voice recognition software. I have made every reasonable attempt to correct obvious errors, but I expect that there are errors of grammar and possibly content that I did not discover before finalizing the note.

## 2025-04-24 NOTE — ASSESSMENT & PLAN NOTE
- His x-ray results were normal, indicating no active tuberculosis or lesions.   - A prescription for rifampin 600 mg daily was provided, with instructions to take it religiously for 4 months.   - He was advised to report any allergic reactions to rifampin promptly.  - Plan to report County for communicable disease  - A referral to infectious disease was made for further follow-up.

## 2025-04-27 RX ORDER — ALBUTEROL SULFATE 90 UG/1
2 INHALANT RESPIRATORY (INHALATION) 4 TIMES DAILY PRN
Qty: 8 G | Refills: 0 | Status: SHIPPED | OUTPATIENT
Start: 2025-04-27 | End: 2025-05-27

## 2025-05-05 ENCOUNTER — TELEPHONE (OUTPATIENT)
Dept: INFECTIOUS DISEASES | Facility: MEDICAL CENTER | Age: 35
End: 2025-05-05
Payer: COMMERCIAL

## 2025-05-05 NOTE — TELEPHONE ENCOUNTER
Caller Name: Vamshi  Call Back Number: [phone]    How would the patient prefer to be contacted with a response: Phone call OK to leave a detailed message

## 2025-05-05 NOTE — TELEPHONE ENCOUNTER
Phone Number Called: [phone]    Call outcome: Spoke to patient regarding message below.    Message: I called pt back to [facility] NP MD visit. Pt dave for 06/19/25 at 1:40pm

## 2025-05-27 DIAGNOSIS — E11.9 TYPE 2 DIABETES MELLITUS WITHOUT COMPLICATION, WITHOUT LONG-TERM CURRENT USE OF INSULIN (HCC): ICD-10-CM

## 2025-05-30 ENCOUNTER — TELEPHONE (OUTPATIENT)
Dept: HEALTH INFORMATION MANAGEMENT | Facility: OTHER | Age: 35
End: 2025-05-30
Payer: COMMERCIAL

## 2025-06-09 RX ORDER — ALBUTEROL SULFATE 90 UG/1
INHALANT RESPIRATORY (INHALATION)
Qty: 9 G | Refills: 0 | Status: SHIPPED | OUTPATIENT
Start: 2025-06-09

## 2025-06-10 ENCOUNTER — TELEMEDICINE (OUTPATIENT)
Dept: NEUROLOGY | Facility: MEDICAL CENTER | Age: 35
End: 2025-06-10
Attending: PSYCHIATRY & NEUROLOGY
Payer: COMMERCIAL

## 2025-06-10 VITALS — HEIGHT: 71 IN | WEIGHT: 256 LBS | BODY MASS INDEX: 35.84 KG/M2

## 2025-06-10 DIAGNOSIS — R56.9 SEIZURE (HCC): ICD-10-CM

## 2025-06-10 DIAGNOSIS — G35 MULTIPLE SCLEROSIS (HCC): Primary | ICD-10-CM

## 2025-06-10 PROCEDURE — 99214 OFFICE O/P EST MOD 30 MIN: CPT | Mod: 95 | Performed by: PSYCHIATRY & NEUROLOGY

## 2025-06-10 ASSESSMENT — PATIENT HEALTH QUESTIONNAIRE - PHQ9: CLINICAL INTERPRETATION OF PHQ2 SCORE: 0

## 2025-06-10 ASSESSMENT — FIBROSIS 4 INDEX: FIB4 SCORE: 0.54

## 2025-06-10 NOTE — PROGRESS NOTES
[facility] Epilepsy Program  Follow Up Visit      Patient's Name: Rafi Zapata Jr.  YOB: 1990  MRN: [id number]  Date of Service: 06/10/25.    Referring Provider: Alfredito Valdivia M.D.  [address]  [address]  [place] [address]    This Remote Face to Face encounter was conducted via Teams.  I am providing medical care to this patient via audio/video visit in place of an in person visit at the request of the patient. Verbal consent to telehealth, risks, benefits, and consequences were discussed. Patient retains the right to withdraw at any time. All existing confidentiality protections apply. The patient has access to all transmitted medical information. No dissemination of any patient images or information to other entities without further written consent.     This evaluation was conducted via Teams using secure and encrypted videoconferencing technology. The patient was in a private location in the [facility], at their work.    The patient's identity was confirmed and verbal consent was obtained for this virtual visit. The provider, myself, was located at the [facility], [place], [place], in private setting, to ensure confidentiality of the encounter.     Chief Concern: Seizures. Multiple Sclerosis.     The patient presents for a follow up. The patient presents alone today.     HPI (as obtained at the time of the initial visit and updated as necessary): The patient is a 35 y.o., right-handed male, who initially presented to my epilepsy clinic for evaluation of seizures on 08/28/2023.    The patient had his first known seizure on 08/13/2023, as noted under event type #1. He his second similar event on 08/24/2023, and was started on Keppra after the second event.    He never had isolated staring spells. No history of oral/manual automatisms. No history of gastric uprising. No autonomic phenomena. No myoclonus. No nocturnal seizures as of the time of the initial visit  "with me.     He has metallic/blood taste after eating chips.     He is on Keppra and has no adverse effects from it, specifically, no mood issues; there was fatigue at the start of Keppra, but this got better.     He has asthma is overall well controlled at this time.    He used to smoke marijuana heavily and stopped on 08/13/2023.     The patient used to smoke high potency marijuana, energy drinks, and was on Abx, and in that context, had an episode of bilateral body numbness that lasted for an hour and did not come back after he stopped drinking energy drinks.     The patient experienced what appears to be his first symptoms of multiple sclerosis in January 2025.  The symptoms were described as left leg tingling followed by left leg numbness lasting for several days.  He had no other symptoms at that time.    The patient had an episode of dizziness of 02/12/2025, which might have been another symptom of MS, but it fully resolved by the next day.     As of April 2025, except above-noted transient left leg numbness/tingling, as well as an episode of dizziness, the patient does not have significant fatigue, no muscle weakness anywhere, no trouble with balance, no changes in vision, no problems with walking, and no neuropathic pain.  He did not express any concerns about bowel/bladder challenges.    The patient was diagnosed with multiple sclerosis in 2025.     Semiology:  Event type #1: \"Seizures\".  Year/Age of Onset: August 2023.  Initial features: Lightheadedness that started on top of his head and his head felt cold. He at times experiences karlee vu for several hours prior to the event. Smelling a strange smell (smoke-like).   Event features: Stared off, loses awareness, and turned his head to the right. This is followed by a bilateral tonic-clonic movements. He has no recollection for the time during the event and is he responsive during the event. No tongue bite. No bladder/bowel incontinence.  Post-ictal " "features: Feels very confused and sore after the event.   Duration: Around 1 minute.   Frequency: The first event was on 08/13/2023. The next event was on 08/24/2023. The next event was on 09/14/2023. The next event was on 05/15/2024 and continued to have seizures through the summer of 2024. The last event was on 08/10/2024.   Precipitating factors: Stress. Heat. Exposure to gun cleaning solvent (this was 3 hours prior to his initial seizure).     Event type #2: \"Spells\".  Year/Age of Onset: August 2023.  Initial features: Sensation of weird smell. Feeling lightheaded.   Event features: No confusion nor loss of awareness. He tries to focus on different things while experiencing above.   Post-ictal features: Back to baseline.  Duration: 1-2 minutes.  Frequency: At times in clusters (up to 5 per night). Events during the summer of 2024. None since July 2024.    Precipitating factors: Not clear.     History of status epilepticus: no  History of physical injury related to seizures: yes  History of surgery related to epilepsy: no  Family Planning: N/A  Current Driving Status: Not driving at this time.   Seizure Clusters: No   Longest Seizure Freedom: Seizure free since 08/10/2024    Pertinent Ancillary Test Results:    MRI brain studies:   - MRI brain (09/10/2023  at [facility]): \"1.  MRI of the brain without and with contrast within normal limits. 2.  Scattered paranasal sinus disease.\" I reviewed key images with the patient on 09/26/2023.     - MRI brain w/wo contrast (10/26/2024, [facility]): \"IMPRESSION: 1.  There are small periventricular T2 hyperintensities noted adjacent to the atrium of the right lateral ventricle suspicious for chronic demyelinating areas. These findings are new since the previous study. There are no enhancing lesions. There are no posterior fossa lesions. These findings does not fulfill the Powell MR criteria for diagnosing demyelinating plaques of multiple sclerosis. Please correlate with clinical " "and other laboratory evidence. 2.  In view of history of seizure, there is no MR evidence of cortical dysplasia,  neuronal migrational abnormality or hippocampal sclerosis.\" I reviewed images with the patient on 11/13/2024.      - MRI c-spine w/wo contrast (01/23/2025): \"IMPRESSION: 1.  Focal, well-circumscribed T2 signal abnormality likely representing a demyelinating lesion noted in the right lateral hemicord at the C5 level with mild enhancement.\" I reviewed images on 02/10/2025.     - MRI t-spine w/wo contrast (01/23/2025): \"MRI of the thoracic spine without and with contrast within normal limits. \" I reviewed images on 02/10/2025.    CT head studies:   - CT head wo contrast ([facility]/14/2023 [facility] \"No acute intracranial abnormality. Paranasal sinusitis.\" I reviewed the images on 08/28/2023.     EEG studies:   - Standard EEG - none available for my review.      - 23-hour ambulatory EEG (10/24/2023, Dr. Oshea): This was, within the above noted limitations, a normal ambulatory EEG recording in the awake and drowsy/sleep state(s):  -No regional slowing or persistent focal asymmetries were seen.  -No epileptiform discharges or other epileptiform phenomena seen.  -No seizures. Clinical correlation is recommended.  -Clinical Events: None.  -Single lead EKG showed occasional PVCs - please correlate clinically.      - 1-day ambulatory EEG (10/29/2024, Dr. Oshea): This was an abnormal ambulatory EEG recording in the awake and drowsy/sleep state(s):  The presence of occasional to frequent, left temporal, maximal over the F7 contact, epileptiform discharges, points toward increased propensity toward focal seizures arising from that region.   No definitive seizures.   Clinical Events: None.        INTERIM HISTORY (06/10/2025): The patient remains seizure free.  He is taking Keppra 2000 mg twice daily and Vimpat 50 mg twice daily regularly, and overall has no adverse effects from these.    The patient had an episode " of left leg numbness, which was basically identical as the one that he had earlier in 2025, which lasted from 6/6/2025 to 6/9/2025, in context of spending a lot of time outside in excess heat.  He had no fatigue, and the symptoms resolved as of 6/9/2025.  He had no other symptoms concerning for MS exacerbation/recrudescence.    He did not have a chance to research MS drug modulating therapy.    He is scheduled to see an infectious disease specialist in 9 days.    The patient is still to research the medications for MS, but he has completed his blood work.    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 Unfortunately, his job requires him to spend a lot of time outside, in heat of the day.     Current Antiseizure Medications: Keppra 2000 mg BID. Vimpat 50 mg BID.     Previously Tried Antiseizure Medications: None.     Review of Systems: No recent fevers. His weight has been stable. The mood is overall stable. No SI/HI.     Risk Factors For Epilepsy/Seizure Disorder: The patient is a product of normal pregnancy and uncomplicated delivery. The early development was normal and the patient started waking, talking, and engaging in social interaction as expected. There were no challenges during school and no reported attendance of special education programs. There is no history of febrile seizures. There is no history of head trauma. There is no history of stroke. There is no history of CNS infections, such as encephalitis and/or meningitis. There is no history of neurosurgical interventions. There is no reported history of staring spells during childhood/school years.    Past Medical History:  Past Medical History:   Diagnosis Date    ASTHMA     Seizure disorder (HCC)      Past Surgical History:  Ear surgery as child. Right leg fracture (not sure if there was a surgical repair).     Social History:  Social History     Tobacco Use    Smoking status: Every Day     Current  packs/day: 1.00     Average packs/day: 1 pack/day for 15.0 years (15.0 ttl pk-yrs)     Types: Cigarettes    Smokeless tobacco: Never   Vaping Use    Vaping status: Never Used   Substance Use Topics    Alcohol use: No    Drug use: Not Currently     Types: Inhaled, Marijuana     Comment: not currently     Family History:  There is no known family history of seizures/epilepsy.        6/10/2025     2:00 PM 4/4/2025    11:30 AM 2/10/2025     3:00 PM 11/13/2024     3:30 PM 10/14/2024    11:30 AM   PHQ-9 Screening   Little interest or pleasure in doing things 0 - not at all 0 - not at all 0 - not at all 0 - not at all 0 - not at all   Feeling down, depressed, or hopeless 0 - not at all 0 - not at all 0 - not at all 0 - not at all 0 - not at all   PHQ-2 Total Score 0 0 0 0 0     [place] Suicide Reassessment  New or continued thoughts about killing self?: No  Preparing to end life?: No    Allergies:  Allergies   Allergen Reactions    Varenicline Unspecified     Patient took medication and almost had a seizure because of it        Current Medications:    Current Outpatient Medications:     albuterol, INHALE 2 PUFFS BY MOUTH 4 TIMES DAILY AS NEEDED FOR SHORTNESS OF BREATH FOR UP TO 30 DAYS (Patient taking differently: INHALE 2 PUFFS BY MOUTH 4 TIMES DAILY AS NEEDED FOR SHORTNESS OF BREATH FOR UP TO 30 DAYS), Taking Differently    metFORMIN, 500 mg, Oral, BID WITH MEALS, Taking    levetiracetam, 2,000 mg, Oral, BID, Taking    lacosamide, 50 mg, Oral, BID, Taking    fluticasone, 2 Spray, Nasal, DAILY (Patient taking differently: 2 Spray, Nasal, DAILY), Taking Differently    vitamin D3, 5,000 Units, Oral, DAILY, Taking    Physical Examination:    Ambulatory Vitals  Encounter Vitals  Not able to obtain since video visit.     Neurological Examination (via video):  Mental Status: The patient is alert and oriented to person, place, time, and situation. Speech is fluent, with no aphasia nor dysarthria noted. Affect is  normal.    Cranial Nerve Examination:  CN III, IV, VI: Eye movements are normal in all directions.   CN VII: No significant facial muscle or other soft tissue asymmetry.  CN XI: Symmetrical shoulder shrug exam.  CN XII: Midline tongue protrusion and moves symmetrically to each side.     Motor Examination:  Moves all four extremities spontaneously.     Muscle Stretch Reflexes Examination:  Deferred.    Sensory Examination:  Deferred    Coordination:  Normal finger to nose testing bilaterally, no postural nor intentional tremor was noted.     Stance/gait:  Normal regular gait.     Labs:   - Keppra (09/26/2023): 13   - Keppra (02/12/2024): 16     - Hep C Ab (04/02/2025): Non-reactive.    - Hep Bs Ab (04/02/2025): Non-reactive.    - Hep Bc Ab (04/02/2025): Non-reactive.    - Hep Bs Ag (04/02/2025): Non-reactive.      - IgA (04/02/2025): 376   - IgG (04/02/2025): 895   - IgM (04/02/2025): 109     - VZV Antibodies (04/02/2025): IgM negative, IgG postiive   - SRINATH Virus PCR (04/02/2025): Not detected.   - Quantiferon Gold TB (04/02/2025): Positive     - CMP (04/02/2025): Elevated glucose and mildly elevatedAlkPhos   - CBC (04/02/2025): Normal.     ASSESSMENT AND PLAN:  1. 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  The presence of small periventricular T2 hyperintensities noted adjacent to the atrium of the right lateral ventricle on MRI brain done in October 2024 (new compared to MRI brain done in 2023) was suspicious for chronic demyelinating area and might be related to his new onset focal epilepsy in 2023 (he now has an established diagnosis of MS).  Ambulatory EEG done in October 2024 showed occasional to frequent left anterior temporal discharges.  In that context, the patient has a diagnosis consistent with focal epilepsy, lateralizing to the left, localizing to the left, most likely mesial, temporal lobe.    The patient remains seizure-free on current antiseizure medication regimen.   Adverse effects were briefly discussed.  Refills were provided at the prior visit.   - levetiracetam (KEPPRA) 1000 MG tablet; Take 2 Tablets by mouth 2 times a day.  Dispense: 120 Tablet; Refill: 5 [refills not needed]  - lacosamide (VIMPAT) 50 MG Tab tablet; Take 1 Tablet by mouth 2 times a day for 180 days.  Dispense: 60 Tablet; Refill: 5 [refills not needed]    Since the patient remains seizure-free, he may continue to drive.    We will defer repeating MRI brain/EEG at this time, from seizure perspective.     Epilepsy counseling provided in writing.    2. Demyelinating lesion (HCC)/Multiple sclerosis.   There are small periventricular T2 hyperintensities noted adjacent to the atrium of the right lateral ventricle suspicious for chronic demyelinating areas. This is a new change noted on MRI brain in October 2024, when compared to MRI brain done in September 2023.  The patient experienced a left leg tingling, followed by numbness for several days in January 2025.  This, along with the findings on MRI cervical spine in January 2025, consistent with subacute demyelinating lesion at C5 level noted that the time (focal, well-circumscribed T2 signal abnormality likely representing a demyelinating lesion noted in the right lateral hemicord at the C5 level with mild enhancement), establishes the diagnosis of multiple sclerosis.  The patient expressed a wish to defer CSF workup at this time, as per early 2025 visit.     We will obtain tests to rule out MOGAD/NMOSD:  - CNS DEMYELINATING DISEASE RFLX PANEL; Future [not done]  - Aquaporin-4 Receptor IgG, Serum, rflx Titer; Future [not done]    The patient was again advised to research Kesimpta, Ocrevus, and Tysabri, and we will avoid sphingosine-1-phosphate receptor (S1PR) modulators at this time due to the history of heart rhythm changes. In preparation for MS DMT, we obtained the blood work, as documented above.   - the patient was advised to follow up with ID and ask about  safety of DMTs for MS in context of latent TB - the patient will send me a [employer] message after that visit.    - the patient was advised to consider Hep B vaccination at the time of the prior visit.     The patient likely had an MS pseudo relapse in June 2025, in context of being outside in heat of the day, and his left leg numbness sensation being basically exactly the same as he had in January 2025; he had no other symptoms concerning for MS relapse.    We discussed effects of heat on MS symptoms.     He was advised in the past to follow up with PCP for PVCs.    Patient Instructions   Please research the following medications for MS:  Kesimpta  Ocrevus  Tysabri    Please take Keppra 2000 mg every 12 hours.     Please take Vimpat 50 mg twice daily.     Please seek emergent help if you experience any lightheadedness or changes in heart rhythm.     Please let our office know if you have any changes in your seizure frequency and/characteristics. Otherwise, please keep the diary of your events and bring it with you at the time of your next follow up visit with our office.     Please take vitamin D3 5000 internation units daily.     Please note that the following might precipitate seizures: missed doses of antiseizure medications, being sick with fever, stress, fatigue, sleep deprivation, not eating regularly, not drinking enough water, drinking too much alcohol, stopping alcohol suddenly if you are currently using it on a regular/daily basis, and/or using recreational drugs, among others.    Please note that the following might lead to an injury, potentially a life-threatening injury, in case you have a seizure and/or lose awareness while:   - being in a large body of water by yourself, such as bath, pool, lake, ocean, among others (risk of drowning)   - being on unprotected heights (risk of fall)   - being around and/or operating heavy machinery (risk of injury)   - being around open fire/hot surfaces (risk of  burns)   - any other activities/circumstances, in which if you lose awareness, you might injure yourself and/or others.    Please call for help (crisis line and/or 911) in case you have thoughts of harming yourself and/or others.    Please stop driving if you experience any changes/loss of awareness and/or seizures.     ------------------------------------------------------------------------------------------  Instructions for your family/caregivers:  Please call 911 if the patient has a seizure longer than 2-3 minutes, if seizures are back to back without him recovering to his baseline, or he does not start recovering within 5-10 minutes after the seizure stops. During the seizure - please turn him on his side, please make sure his head is protected (for example, you should put a pillow under his head, if one is available), and please do not put anything in his mouth.   -------------------------------------------------------------------------------------------    It is important that your seizures are well controlled and you have none or have them rarely. In addition to avoiding injury related to breakthrough seizures, frequent seizures increase risk of SUDEP (sudden unexpected death in epilepsy), where a person goes into a seizure and then never wakes up - this is a rare complication of seizure disorder; one of the best available ways to prevent it is to control your seizures well.     Due to the high volume of patients we are trying to help, your physician will not be able to respond by phone or in MyChart to your routine concerns between appointments.  This does not reflect a lack of interest or concern for you or your diagnosis.  Please bring these questions and concerns to your appointment where your physician can answer.  Please relay more pressing concerns to our office, either via MyChart, or by phone; if not able to reach us please visit nearby Urgent Care Center or Emergency Department.  If any emergent  medical needs, please seek emergent medical help and/or call 911.    Please note that we are not able to fill out paperwork that might be related to your work, utility company, disability, and/or driving, among others, in between the visits.  Please schedule a dedicated appointment to address your paperwork, so we can do that in a timely manner.  This is not due to lack of concern or interest for your disease-related work/administrative problems, but to make sure that we provide the best possible care and to fill out your paperwork in a correct and timely manner.    Thank you for entrusting your neurological care to [facility] Neurology and we look forward to continuing to serve you.      Follow up in 1.5-2 months.     My total time spent caring for the patient on the day of the encounter was 24 minutes.   This does not include time spent on separately billable procedures/tests.      Catracho sOhea MD  Outpatient Neurology   [facility]

## 2025-06-10 NOTE — PATIENT INSTRUCTIONS
Please research the following medications for MS:  Kesimpta  Ocrevus  Tysabri    Please take Keppra 2000 mg every 12 hours.     Please take Vimpat 50 mg twice daily.     Please seek emergent help if you experience any lightheadedness or changes in heart rhythm.     Please let our office know if you have any changes in your seizure frequency and/characteristics. Otherwise, please keep the diary of your events and bring it with you at the time of your next follow up visit with our office.     Please take vitamin D3 5000 internation units daily.     Please note that the following might precipitate seizures: missed doses of antiseizure medications, being sick with fever, stress, fatigue, sleep deprivation, not eating regularly, not drinking enough water, drinking too much alcohol, stopping alcohol suddenly if you are currently using it on a regular/daily basis, and/or using recreational drugs, among others.    Please note that the following might lead to an injury, potentially a life-threatening injury, in case you have a seizure and/or lose awareness while:   - being in a large body of water by yourself, such as bath, pool, lake, ocean, among others (risk of drowning)   - being on unprotected heights (risk of fall)   - being around and/or operating heavy machinery (risk of injury)   - being around open fire/hot surfaces (risk of burns)   - any other activities/circumstances, in which if you lose awareness, you might injure yourself and/or others.    Please call for help (crisis line and/or 911) in case you have thoughts of harming yourself and/or others.    Please stop driving if you experience any changes/loss of awareness and/or seizures.     ------------------------------------------------------------------------------------------  Instructions for your family/caregivers:  Please call 911 if the patient has a seizure longer than 2-3 minutes, if seizures are back to back without him recovering to his baseline, or he  does not start recovering within 5-10 minutes after the seizure stops. During the seizure - please turn him on his side, please make sure his head is protected (for example, you should put a pillow under his head, if one is available), and please do not put anything in his mouth.   -------------------------------------------------------------------------------------------    It is important that your seizures are well controlled and you have none or have them rarely. In addition to avoiding injury related to breakthrough seizures, frequent seizures increase risk of SUDEP (sudden unexpected death in epilepsy), where a person goes into a seizure and then never wakes up - this is a rare complication of seizure disorder; one of the best available ways to prevent it is to control your seizures well.     Due to the high volume of patients we are trying to help, your physician will not be able to respond by phone or in [employer] to your routine concerns between appointments.  This does not reflect a lack of interest or concern for you or your diagnosis.  Please bring these questions and concerns to your appointment where your physician can answer.  Please relay more pressing concerns to our office, either via A[employer], or by phone; if not able to reach us please visit nearby Urgent Care Center or Emergency Department.  If any emergent medical needs, please seek emergent medical help and/or call 911.    Please note that we are not able to fill out paperwork that might be related to your work, utility company, disability, and/or driving, among others, in between the visits.  Please schedule a dedicated appointment to address your paperwork, so we can do that in a timely manner.  This is not due to lack of concern or interest for your disease-related work/administrative problems, but to make sure that we provide the best possible care and to fill out your paperwork in a correct and timely manner.    Thank you for entrusting your  neurological care to [facility] and we look forward to continuing to serve you.

## 2025-06-19 ENCOUNTER — OFFICE VISIT (OUTPATIENT)
Dept: INFECTIOUS DISEASES | Facility: MEDICAL CENTER | Age: 35
End: 2025-06-19
Attending: INTERNAL MEDICINE
Payer: COMMERCIAL

## 2025-06-19 VITALS
HEIGHT: 71 IN | SYSTOLIC BLOOD PRESSURE: 118 MMHG | DIASTOLIC BLOOD PRESSURE: 72 MMHG | BODY MASS INDEX: 34.9 KG/M2 | OXYGEN SATURATION: 93 % | RESPIRATION RATE: 16 BRPM | WEIGHT: 249.31 LBS | TEMPERATURE: 97.4 F | HEART RATE: 96 BPM

## 2025-06-19 DIAGNOSIS — G35 MULTIPLE SCLEROSIS (HCC): ICD-10-CM

## 2025-06-19 DIAGNOSIS — Z22.7 LATENT TUBERCULOSIS: Primary | ICD-10-CM

## 2025-06-19 DIAGNOSIS — R76.12 POSITIVE QUANTIFERON-TB GOLD TEST: ICD-10-CM

## 2025-06-19 DIAGNOSIS — E11.9 TYPE 2 DIABETES MELLITUS WITHOUT COMPLICATION, WITHOUT LONG-TERM CURRENT USE OF INSULIN (HCC): ICD-10-CM

## 2025-06-19 DIAGNOSIS — G37.9 DEMYELINATING LESION (HCC): ICD-10-CM

## 2025-06-19 PROCEDURE — 99213 OFFICE O/P EST LOW 20 MIN: CPT | Performed by: INTERNAL MEDICINE

## 2025-06-19 PROCEDURE — 3074F SYST BP LT 130 MM HG: CPT | Performed by: INTERNAL MEDICINE

## 2025-06-19 PROCEDURE — 3078F DIAST BP <80 MM HG: CPT | Performed by: INTERNAL MEDICINE

## 2025-06-19 PROCEDURE — 99204 OFFICE O/P NEW MOD 45 MIN: CPT | Performed by: INTERNAL MEDICINE

## 2025-06-19 RX ORDER — RIFAMPIN 300 MG/1
600 CAPSULE ORAL DAILY
Qty: 60 CAPSULE | Refills: 3 | Status: SHIPPED | OUTPATIENT
Start: 2025-06-19 | End: 2025-10-17

## 2025-06-19 ASSESSMENT — FIBROSIS 4 INDEX: FIB4 SCORE: 0.54

## 2025-06-19 NOTE — PROGRESS NOTES
[facility] INFECTIOUS DISEASES CLINIC NOTE     Date of Service: 6/19/2025    Referring Physician: Mara Hendricks M.D.  [address]  [address]  [place] [address]    Reason for Referral: Latent TB    Chief Complaint: Latent TB    History of Present Illness:     Rafi Zapata Jr. is a 35 y.o. male patient here for evaluation of positive QuantiFERON gold.  Extensive review documentation from multiple specialties performed in generating this HPI.  Patient with focal epilepsy 2023, new diagnosis of MS, currently on Keppra and Vimpat, considering other agents such as Ocrevus, Kesimpta, Tysabri, uncontrolled type 2 diabetes based on hemoglobin A1c of 9.4 in April 2025. CXR from April 2025 with no acute cardiopulmomary findings.    Risk factors for TB include a period of homelessness between the ages of 18 and 20, and patient worked in a medical facility (skilled nursing facility) between 2022 and 2025.  He notes that he received yearly TB skin tests and these were always negative.  As part of screening prior to consideration of MS medications, he received a QuantiFERON gold testing and this has returned positive.  He has no hemoptysis, fevers or night sweats, cough, or significant weight loss.  Patient has not traveled to any other areas of the country or outside the country.    Review of Systems:  All other systems reviewed and are negative expect as noted in HPI    Past Medical History[1]    Past Surgical History[2]    Family History   Problem Relation Age of Onset    Diabetes Mother     Diabetes Father     Bladder cancer Sister     Bladder cancer Paternal Aunt     Leukemia Paternal Uncle        Social History     Socioeconomic History    Marital status: Single     Spouse name: Not on file    Number of children: Not on file    Years of education: Not on file    Highest education level: Not on file   Occupational History    Not on file   Tobacco Use    Smoking status: Every Day     Current packs/day: 1.00     " Average packs/day: 1 pack/day for 15.0 years (15.0 ttl pk-yrs)     Types: Cigarettes    Smokeless tobacco: Never   Vaping Use    Vaping status: Never Used   Substance and Sexual Activity    Alcohol use: No    Drug use: Not Currently     Types: Inhaled, Marijuana     Comment: not currently    Sexual activity: Yes     Partners: Female   Other Topics Concern    Not on file   Social History Narrative    Not on file     Social Drivers of Health     Financial Resource Strain: Not on file   Food Insecurity: Not on file   Transportation Needs: Not on file   Physical Activity: Not on file   Stress: Not on file   Social Connections: Not on file   Intimate Partner Violence: Not on file   Housing Stability: Not on file       Allergies[3]    Medications:  Medications Ordered Prior to Encounter[4]    Physical Exam:   Vital Signs: /72   Pulse 96   Temp 36.3 °C (97.4 °F) (Temporal)   Resp 16   Ht 1.803 m (5' 11\")   Wt 113 kg (249 lb 5 oz)   SpO2 93%   BMI 34.77 kg/m²   Vital signs reviewed  Constitutional: Patient is oriented to person, place, and time. Appears well-developed and well-nourished. No distress  Head: Atraumatic, normocephalic  Eyes: Conjunctivae normal  Mouth/Throat: Lips without lesions  Cardiovascular: Normal rate  Pulmonary/Chest: No respiratory distress. Unlabored respiratory effort  Abdominal: Non distended  Skin: No rashes or embolic phenomena noted on exposed skin  Psychiatric: Normal mood and affect. Behavior is normal.     LABS:  WBC   Date/Time Value Ref Range Status   04/02/2025 01:47 PM 7.7 4.8 - 10.8 K/uL Final     RBC   Date/Time Value Ref Range Status   04/02/2025 01:47 PM 5.50 4.70 - 6.10 M/uL Final     Hemoglobin   Date/Time Value Ref Range Status   04/02/2025 01:47 PM 17.8 14.0 - 18.0 g/dL Final     Hematocrit   Date/Time Value Ref Range Status   04/02/2025 01:47 PM 51.1 42.0 - 52.0 % Final     MCV   Date/Time Value Ref Range Status   04/02/2025 01:47 PM 92.9 81.4 - 97.8 fL Final " "    MCH   Date/Time Value Ref Range Status   04/02/2025 01:47 PM 32.4 27.0 - 33.0 pg Final     MCHC   Date/Time Value Ref Range Status   04/02/2025 01:47 PM 34.8 32.3 - 36.5 g/dL Final     MPV   Date/Time Value Ref Range Status   04/02/2025 01:47 PM 11.0 9.0 - 12.9 fL Final     Sodium   Date/Time Value Ref Range Status   04/02/2025 01:47  135 - 145 mmol/L Final     Potassium   Date/Time Value Ref Range Status   04/02/2025 01:47 PM 4.2 3.6 - 5.5 mmol/L Final     Chloride   Date/Time Value Ref Range Status   04/02/2025 01:47  96 - 112 mmol/L Final     Co2   Date/Time Value Ref Range Status   04/02/2025 01:47 PM 23 20 - 33 mmol/L Final     Glucose   Date/Time Value Ref Range Status   04/02/2025 01:47  (H) 65 - 99 mg/dL Final     Bun   Date/Time Value Ref Range Status   04/02/2025 01:47 PM 12 8 - 22 mg/dL Final     Creatinine   Date/Time Value Ref Range Status   04/02/2025 01:47 PM 0.86 0.50 - 1.40 mg/dL Final     Alkaline Phosphatase   Date/Time Value Ref Range Status   04/02/2025 01:47  (H) 30 - 99 U/L Final     AST(SGOT)   Date/Time Value Ref Range Status   04/02/2025 01:47 PM 21 12 - 45 U/L Final     ALT(SGPT)   Date/Time Value Ref Range Status   04/02/2025 01:47 PM 33 2 - 50 U/L Final     Total Bilirubin   Date/Time Value Ref Range Status   04/02/2025 01:47 PM 0.5 0.1 - 1.5 mg/dL Final      CPK Total   Date/Time Value Ref Range Status   08/14/2023 01:35  (H) 0 - 154 U/L Final        MICRO:  No results found for: \"BLOODCULTU\", \"BLDCULT\", \"BCHOLD\"      Latest pertinent labs were reviewed    IMAGING STUDIES:  As above    Assessment:   Rafi Johnson Benny Moreno is a pleasant 35 y.o. male patient with focal epilepsy, new diagnosis of MS, recently found to have latent TB after a positive QuantiFERON gold.  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  His chest x-ray is normal and he has no symptomatology " "concerning for active TB.  He has had multiple negative TB skin tests.  While sequential TB skin testing may \"boost\" the subsequent response, this is not thought to be strong enough to cause a false positive IGRA. This along with the fact that he is being considered for possible immune suppressant medications, I agree with the PCP recommendations to proceed with latent TB treatment.  Discussed the various options and the pros and cons of each and following shared decision making, 4 months of daily oral rifampin was picked.    Pertinent Diagnoses:  Latent TB  MS  Focal epilepsy  Type 2 diabetes    Plan:   - P.o. rifampin 600 mg daily for 4 months.  Orders placed  - Discussed potential adverse effects of rifampin.  Recommended abstaining from alcohol  - Repeat CBC with differential, CMP in 3 to 4 weeks ordered  - Check for interactions between rifampin and his current medications, none significant noted  - Once these 4 months of rifampin are completed, the history of latent TB need not alter the decision to start/choice of MS medications as his statistical risk of active TB would have been decreased to the lowest extent possible based on current guidelines    Return to ID clinic in 1 month    Kelby Arellano M.D.    Please note that this dictation was created using voice recognition software. I have worked with technical experts from [facility] to optimize the interface.  I have made every reasonable attempt to correct obvious errors, but there may be errors of grammar and possibly content that I did not discover before finalizing the note.         [1]   Past Medical History:  Diagnosis Date    ASTHMA     Seizure disorder (HCC)    [2] No past surgical history on file.  [3]   Allergies  Allergen Reactions    Varenicline Unspecified     Patient took medication and almost had a seizure because of it    [4]   Current Outpatient Medications on File Prior to Visit   Medication Sig Dispense Refill    albuterol 108 (90 " Base) MCG/ACT Aero Soln inhalation aerosol INHALE 2 PUFFS BY MOUTH 4 TIMES DAILY AS NEEDED FOR SHORTNESS OF BREATH FOR UP TO 30 DAYS 9 g 0    metFORMIN (GLUCOPHAGE) 500 MG Tab Take 1 Tablet by mouth 2 times a day with meals for 90 days. 180 Tablet 0    levetiracetam (KEPPRA) 1000 MG tablet Take 2 Tablets by mouth 2 times a day. 120 Tablet 5    lacosamide (VIMPAT) 50 MG Tab tablet Take 1 Tablet by mouth 2 times a day for 180 days. 60 Tablet 5    fluticasone (FLONASE) 50 MCG/ACT nasal spray Administer 2 Sprays into affected nostril(S) every day. 16 g 3    Vitamin D3 5000 Unit (125 mcg) Tab Take 1 Tablet by mouth every day. 90 Tablet 3     No current facility-administered medications on file prior to visit.

## 2025-07-11 NOTE — TELEPHONE ENCOUNTER
Received request via: Pharmacy    Was the patient seen in the last year in this department? Yes    Does the patient have an active prescription (recently filled or refills available) for medication(s) requested? No    [facility]    Does the patient have senior living Plus and need 100-day supply? (This applies to ALL medications) Patient does not have SCP

## 2025-07-14 RX ORDER — ALBUTEROL SULFATE 90 UG/1
INHALANT RESPIRATORY (INHALATION)
Qty: 9 G | Refills: 0 | Status: SHIPPED | OUTPATIENT
Start: 2025-07-14

## 2025-07-22 ENCOUNTER — OFFICE VISIT (OUTPATIENT)
Dept: INFECTIOUS DISEASES | Facility: MEDICAL CENTER | Age: 35
End: 2025-07-22
Attending: INTERNAL MEDICINE
Payer: COMMERCIAL

## 2025-07-22 VITALS
BODY MASS INDEX: 35.32 KG/M2 | WEIGHT: 252.31 LBS | OXYGEN SATURATION: 91 % | DIASTOLIC BLOOD PRESSURE: 70 MMHG | SYSTOLIC BLOOD PRESSURE: 120 MMHG | HEIGHT: 71 IN | HEART RATE: 99 BPM | TEMPERATURE: 97.7 F | RESPIRATION RATE: 20 BRPM

## 2025-07-22 DIAGNOSIS — Z22.7 LATENT TUBERCULOSIS: Primary | ICD-10-CM

## 2025-07-22 PROCEDURE — 3078F DIAST BP <80 MM HG: CPT | Performed by: INTERNAL MEDICINE

## 2025-07-22 PROCEDURE — 99214 OFFICE O/P EST MOD 30 MIN: CPT | Performed by: INTERNAL MEDICINE

## 2025-07-22 PROCEDURE — 3074F SYST BP LT 130 MM HG: CPT | Performed by: INTERNAL MEDICINE

## 2025-07-22 ASSESSMENT — FIBROSIS 4 INDEX: FIB4 SCORE: 0.54

## 2025-07-22 NOTE — PROGRESS NOTES
"[facility] INFECTIOUS DISEASES CLINIC NOTE     Date of Service: 7/22/25    Referring Physician: Mara Hendricks M.D.  [address]  [address]  [place] [address]    Reason for Referral: Latent TB    Chief Complaint: Latent TB    History of Present Illness:   Last seen by Dr white 6/19/25  \"Rafi Zapata Jr. is a 35 y.o. male patient here for evaluation of positive QuantiFERON gold.  Extensive review documentation from multiple specialties performed in generating this HPI.  Patient with focal epilepsy 2023, new diagnosis of MS, currently on Keppra and Vimpat, considering other agents such as Ocrevus, Kesimpta, Tysabri, uncontrolled type 2 diabetes based on hemoglobin A1c of 9.4 in April 2025. CXR from April 2025 with no acute cardiopulmomary findings.    Risk factors for TB include a period of homelessness between the ages of 18 and 20, and patient worked in a medical facility (skilled nursing facility) between 2022 and 2025.  He notes that he received yearly TB skin tests and these were always negative.  As part of screening prior to consideration of MS medications, he received a QuantiFERON gold testing and this has returned positive.  He has no hemoptysis, fevers or night sweats, cough, or significant weight loss.  Patient has not traveled to any other areas of the country or outside the country.\"    7/22/25  Here for routine FU LTBI  Tolerating rifampin well. Denies SE  No sxs TB  Plan of care reviewed  Indication for symptom screen in the future reviewed    Review of Systems:  All other systems reviewed and are negative expect as noted in HPI    Past Medical History[1]    Past Surgical History[2]    Family History   Problem Relation Age of Onset    Diabetes Mother     Diabetes Father     Bladder cancer Sister     Bladder cancer Paternal Aunt     Leukemia Paternal Uncle        Social History     Socioeconomic History    Marital status: Single     Spouse name: Not on file    Number of children: Not " "on file    Years of education: Not on file    Highest education level: Not on file   Occupational History    Not on file   Tobacco Use    Smoking status: Every Day     Current packs/day: 1.00     Average packs/day: 1 pack/day for 15.0 years (15.0 ttl pk-yrs)     Types: Cigarettes    Smokeless tobacco: Never   Vaping Use    Vaping status: Never Used   Substance and Sexual Activity    Alcohol use: No    Drug use: Not Currently     Types: Inhaled, Marijuana     Comment: not currently    Sexual activity: Yes     Partners: Female   Other Topics Concern    Not on file   Social History Narrative    Not on file     Social Drivers of Health     Financial Resource Strain: Not on file   Food Insecurity: Not on file   Transportation Needs: Not on file   Physical Activity: Not on file   Stress: Not on file   Social Connections: Not on file   Intimate Partner Violence: Not on file   Housing Stability: Not on file       Allergies[3]    Medications:  Medications Ordered Prior to Encounter[4]    Physical Exam:   Vital Signs: /70 (BP Location: Left arm)   Pulse 99   Temp 36.5 °C (97.7 °F)   Resp 20   Ht 1.803 m (5' 11\")   Wt 114 kg (252 lb 5 oz)   SpO2 91%   BMI 35.19 kg/m²   Vital signs reviewed  Constitutional: Patient is oriented to person, place, and time. Appears well-developed and well-nourished. No distress  Head: Atraumatic, normocephalic  Eyes: Conjunctivae normal  Mouth/Throat: Lips without lesions  Cardiovascular: Normal rate  Pulmonary/Chest: No respiratory distress. Unlabored respiratory effort  Abdominal: Non distended  Skin: No rashes or embolic phenomena noted on exposed skin  Psychiatric: Normal mood and affect. Behavior is normal.     LABS:  WBC   Date/Time Value Ref Range Status   04/02/2025 01:47 PM 7.7 4.8 - 10.8 K/uL Final     RBC   Date/Time Value Ref Range Status   04/02/2025 01:47 PM 5.50 4.70 - 6.10 M/uL Final     Hemoglobin   Date/Time Value Ref Range Status   04/02/2025 01:47 PM 17.8 14.0 - " "18.0 g/dL Final     Hematocrit   Date/Time Value Ref Range Status   04/02/2025 01:47 PM 51.1 42.0 - 52.0 % Final     MCV   Date/Time Value Ref Range Status   04/02/2025 01:47 PM 92.9 81.4 - 97.8 fL Final     MCH   Date/Time Value Ref Range Status   04/02/2025 01:47 PM 32.4 27.0 - 33.0 pg Final     MCHC   Date/Time Value Ref Range Status   04/02/2025 01:47 PM 34.8 32.3 - 36.5 g/dL Final     MPV   Date/Time Value Ref Range Status   04/02/2025 01:47 PM 11.0 9.0 - 12.9 fL Final     Sodium   Date/Time Value Ref Range Status   04/02/2025 01:47  135 - 145 mmol/L Final     Potassium   Date/Time Value Ref Range Status   04/02/2025 01:47 PM 4.2 3.6 - 5.5 mmol/L Final     Chloride   Date/Time Value Ref Range Status   04/02/2025 01:47  96 - 112 mmol/L Final     Co2   Date/Time Value Ref Range Status   04/02/2025 01:47 PM 23 20 - 33 mmol/L Final     Glucose   Date/Time Value Ref Range Status   04/02/2025 01:47  (H) 65 - 99 mg/dL Final     Bun   Date/Time Value Ref Range Status   04/02/2025 01:47 PM 12 8 - 22 mg/dL Final     Creatinine   Date/Time Value Ref Range Status   04/02/2025 01:47 PM 0.86 0.50 - 1.40 mg/dL Final     Alkaline Phosphatase   Date/Time Value Ref Range Status   04/02/2025 01:47  (H) 30 - 99 U/L Final     AST(SGOT)   Date/Time Value Ref Range Status   04/02/2025 01:47 PM 21 12 - 45 U/L Final     ALT(SGPT)   Date/Time Value Ref Range Status   04/02/2025 01:47 PM 33 2 - 50 U/L Final     Total Bilirubin   Date/Time Value Ref Range Status   04/02/2025 01:47 PM 0.5 0.1 - 1.5 mg/dL Final      CPK Total   Date/Time Value Ref Range Status   08/14/2023 01:35  (H) 0 - 154 U/L Final        MICRO:  No results found for: \"BLOODCULTU\", \"BLDCULT\", \"BCHOLD\"      Latest pertinent labs were reviewed    IMAGING STUDIES:  As above    Assessment:   Rafi Zapata Jr. is a pleasant 35 y.o. male patient with focal epilepsy, new diagnosis of MS, recently found to have latent TB after a positive " "QuantiFERON gold.  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  His chest x-ray is normal and he has no symptomatology concerning for active TB.  He has had multiple negative TB skin tests.  While sequential TB skin testing may \"boost\" the subsequent response, this is not thought to be strong enough to cause a false positive IGRA. This along with the fact that he is being considered for possible immune suppressant medications, I agree with the PCP recommendations to proceed with latent TB treatment.  Discussed the various options and the pros and cons of each and following shared decision making, 4 months of daily oral rifampin was picked.    Pertinent Diagnoses:  Latent TB  MS  Focal epilepsy  Type 2 diabetes    Plan:   - Continue PO rifampin 600 mg daily for 4 months.  Orders placed last visit  - Discussed potential adverse effects of rifampin.  Continue abstaining from alcohol  - Advised of potential drug interactions  -repeat labs if sxs  - Once these 4 months of rifampin are completed, the history of latent TB need not alter the decision to start/choice of MS medications as his statistical risk of active TB would have been decreased to the lowest extent possible based on current guidelines  -No more PPD or QuantiGold testing-result will not change with treatment  If concern for TB, he would need symptom screen and CXR. Follow with sputum for AFB if indicated by CXR/sxs    Return to ID clinic in prn    Dena Barone M.D.           [1]   Past Medical History:  Diagnosis Date    ASTHMA     Seizure disorder (HCC)    [2] No past surgical history on file.  [3]   Allergies  Allergen Reactions    Varenicline Unspecified     Patient took medication and almost had a seizure because of it    [4]   Current Outpatient Medications on File Prior to Visit   Medication Sig Dispense Refill    albuterol 108 (90 Base) MCG/ACT Aero Soln " inhalation aerosol INHALE 2 PUFFS BY MOUTH 4 TIMES DAILY AS NEEDED FOR SHORTNESS OF BREATH FOR UP TO 30 DAYS 9 g 0    riFAMPin (RIFADINE) 300 MG Cap Take 2 Capsules by mouth every day for 120 days. 60 Capsule 3    metFORMIN (GLUCOPHAGE) 500 MG Tab Take 1 Tablet by mouth 2 times a day with meals for 90 days. 180 Tablet 0    levetiracetam (KEPPRA) 1000 MG tablet Take 2 Tablets by mouth 2 times a day. 120 Tablet 5    lacosamide (VIMPAT) 50 MG Tab tablet Take 1 Tablet by mouth 2 times a day for 180 days. 60 Tablet 5    Vitamin D3 5000 Unit (125 mcg) Tab Take 1 Tablet by mouth every day. 90 Tablet 3    fluticasone (FLONASE) 50 MCG/ACT nasal spray Administer 2 Sprays into affected nostril(S) every day. (Patient not taking: Reported on 7/22/2025) 16 g 3     No current facility-administered medications on file prior to visit.

## 2025-07-24 NOTE — TELEPHONE ENCOUNTER
Received request via: Patient    Was the patient seen in the last year in this department? Yes    Does the patient have an active prescription (recently filled or refills available) for medication(s) requested? No    Pharmacy Name: [facility]    Does the patient have group home Plus and need 100-day supply? (This applies to ALL medications) Patient does not have SCP

## 2025-08-04 ENCOUNTER — APPOINTMENT (OUTPATIENT)
Dept: MEDICAL GROUP | Facility: MEDICAL CENTER | Age: 35
End: 2025-08-04
Payer: COMMERCIAL

## 2025-08-07 ENCOUNTER — APPOINTMENT (OUTPATIENT)
Dept: MEDICAL GROUP | Facility: MEDICAL CENTER | Age: 35
End: 2025-08-07
Payer: COMMERCIAL

## 2025-08-07 DIAGNOSIS — E11.9 TYPE 2 DIABETES MELLITUS WITHOUT COMPLICATION, WITHOUT LONG-TERM CURRENT USE OF INSULIN (HCC): ICD-10-CM

## 2025-08-09 ENCOUNTER — HOSPITAL ENCOUNTER (OUTPATIENT)
Dept: LAB | Facility: MEDICAL CENTER | Age: 35
End: 2025-08-09
Attending: INTERNAL MEDICINE
Payer: COMMERCIAL

## 2025-08-09 ENCOUNTER — HOSPITAL ENCOUNTER (OUTPATIENT)
Dept: LAB | Facility: MEDICAL CENTER | Age: 35
End: 2025-08-09
Attending: STUDENT IN AN ORGANIZED HEALTH CARE EDUCATION/TRAINING PROGRAM
Payer: COMMERCIAL

## 2025-08-09 DIAGNOSIS — E11.9 TYPE 2 DIABETES MELLITUS WITHOUT COMPLICATION, WITHOUT LONG-TERM CURRENT USE OF INSULIN (HCC): ICD-10-CM

## 2025-08-09 DIAGNOSIS — G37.9 DEMYELINATING LESION (HCC): ICD-10-CM

## 2025-08-09 DIAGNOSIS — G35 MULTIPLE SCLEROSIS (HCC): ICD-10-CM

## 2025-08-09 DIAGNOSIS — Z22.7 LATENT TUBERCULOSIS: ICD-10-CM

## 2025-08-09 DIAGNOSIS — R76.12 POSITIVE QUANTIFERON-TB GOLD TEST: ICD-10-CM

## 2025-08-09 LAB
ALBUMIN SERPL BCP-MCNC: 4.3 G/DL (ref 3.2–4.9)
ALBUMIN/GLOB SERPL: 1.4 G/DL
ALP SERPL-CCNC: 92 U/L (ref 30–99)
ALT SERPL-CCNC: 26 U/L (ref 2–50)
ANION GAP SERPL CALC-SCNC: 12 MMOL/L (ref 7–16)
AST SERPL-CCNC: 22 U/L (ref 12–45)
BASOPHILS # BLD AUTO: 1.1 % (ref 0–1.8)
BASOPHILS # BLD: 0.08 K/UL (ref 0–0.12)
BILIRUB SERPL-MCNC: 0.2 MG/DL (ref 0.1–1.5)
BUN SERPL-MCNC: 12 MG/DL (ref 8–22)
CALCIUM ALBUM COR SERPL-MCNC: 9.6 MG/DL (ref 8.5–10.5)
CALCIUM SERPL-MCNC: 9.8 MG/DL (ref 8.5–10.5)
CHLORIDE SERPL-SCNC: 106 MMOL/L (ref 96–112)
CO2 SERPL-SCNC: 22 MMOL/L (ref 20–33)
CREAT SERPL-MCNC: 0.89 MG/DL (ref 0.5–1.4)
CREAT UR-MCNC: 226 MG/DL
EOSINOPHIL # BLD AUTO: 0.27 K/UL (ref 0–0.51)
EOSINOPHIL NFR BLD: 3.7 % (ref 0–6.9)
ERYTHROCYTE [DISTWIDTH] IN BLOOD BY AUTOMATED COUNT: 46.7 FL (ref 35.9–50)
EST. AVERAGE GLUCOSE BLD GHB EST-MCNC: 123 MG/DL
GFR SERPLBLD CREATININE-BSD FMLA CKD-EPI: 114 ML/MIN/1.73 M 2
GLOBULIN SER CALC-MCNC: 3.1 G/DL (ref 1.9–3.5)
GLUCOSE SERPL-MCNC: 96 MG/DL (ref 65–99)
HBA1C MFR BLD: 5.9 % (ref 4–5.6)
HCT VFR BLD AUTO: 50.2 % (ref 42–52)
HGB BLD-MCNC: 17.2 G/DL (ref 14–18)
IMM GRANULOCYTES # BLD AUTO: 0.02 K/UL (ref 0–0.11)
IMM GRANULOCYTES NFR BLD AUTO: 0.3 % (ref 0–0.9)
LYMPHOCYTES # BLD AUTO: 2.93 K/UL (ref 1–4.8)
LYMPHOCYTES NFR BLD: 39.9 % (ref 22–41)
MCH RBC QN AUTO: 32.3 PG (ref 27–33)
MCHC RBC AUTO-ENTMCNC: 34.3 G/DL (ref 32.3–36.5)
MCV RBC AUTO: 94.2 FL (ref 81.4–97.8)
MICROALBUMIN UR-MCNC: <1.2 MG/DL
MICROALBUMIN/CREAT UR: NORMAL MG/G (ref 0–30)
MONOCYTES # BLD AUTO: 0.46 K/UL (ref 0–0.85)
MONOCYTES NFR BLD AUTO: 6.3 % (ref 0–13.4)
NEUTROPHILS # BLD AUTO: 3.59 K/UL (ref 1.82–7.42)
NEUTROPHILS NFR BLD: 48.7 % (ref 44–72)
NRBC # BLD AUTO: 0 K/UL
NRBC BLD-RTO: 0 /100 WBC (ref 0–0.2)
PLATELET # BLD AUTO: 214 K/UL (ref 164–446)
PMV BLD AUTO: 10.4 FL (ref 9–12.9)
POTASSIUM SERPL-SCNC: 4.7 MMOL/L (ref 3.6–5.5)
PROT SERPL-MCNC: 7.4 G/DL (ref 6–8.2)
RBC # BLD AUTO: 5.33 M/UL (ref 4.7–6.1)
SODIUM SERPL-SCNC: 140 MMOL/L (ref 135–145)
WBC # BLD AUTO: 7.4 K/UL (ref 4.8–10.8)

## 2025-08-09 PROCEDURE — 80053 COMPREHEN METABOLIC PANEL: CPT

## 2025-08-09 PROCEDURE — 83036 HEMOGLOBIN GLYCOSYLATED A1C: CPT

## 2025-08-09 PROCEDURE — 85025 COMPLETE CBC W/AUTO DIFF WBC: CPT

## 2025-08-09 PROCEDURE — 36415 COLL VENOUS BLD VENIPUNCTURE: CPT

## 2025-08-09 PROCEDURE — 82570 ASSAY OF URINE CREATININE: CPT

## 2025-08-09 PROCEDURE — 82043 UR ALBUMIN QUANTITATIVE: CPT

## 2025-08-11 ENCOUNTER — OFFICE VISIT (OUTPATIENT)
Dept: MEDICAL GROUP | Facility: MEDICAL CENTER | Age: 35
End: 2025-08-11
Payer: COMMERCIAL

## 2025-08-11 VITALS
TEMPERATURE: 97.3 F | WEIGHT: 246.91 LBS | HEART RATE: 94 BPM | DIASTOLIC BLOOD PRESSURE: 66 MMHG | HEIGHT: 70 IN | OXYGEN SATURATION: 95 % | SYSTOLIC BLOOD PRESSURE: 118 MMHG | BODY MASS INDEX: 35.35 KG/M2

## 2025-08-11 DIAGNOSIS — E11.9 TYPE 2 DIABETES MELLITUS WITHOUT COMPLICATION, WITHOUT LONG-TERM CURRENT USE OF INSULIN (HCC): ICD-10-CM

## 2025-08-11 DIAGNOSIS — J45.20 MILD INTERMITTENT ASTHMA WITHOUT COMPLICATION: Primary | ICD-10-CM

## 2025-08-11 PROCEDURE — 3078F DIAST BP <80 MM HG: CPT | Performed by: STUDENT IN AN ORGANIZED HEALTH CARE EDUCATION/TRAINING PROGRAM

## 2025-08-11 PROCEDURE — 3074F SYST BP LT 130 MM HG: CPT | Performed by: STUDENT IN AN ORGANIZED HEALTH CARE EDUCATION/TRAINING PROGRAM

## 2025-08-11 PROCEDURE — 99214 OFFICE O/P EST MOD 30 MIN: CPT | Performed by: STUDENT IN AN ORGANIZED HEALTH CARE EDUCATION/TRAINING PROGRAM

## 2025-08-11 RX ORDER — ALBUTEROL SULFATE 90 UG/1
2 INHALANT RESPIRATORY (INHALATION) EVERY 4 HOURS PRN
Qty: 18 G | Refills: 8 | Status: SHIPPED | OUTPATIENT
Start: 2025-08-11

## 2025-08-11 ASSESSMENT — ENCOUNTER SYMPTOMS
DIARRHEA: 0
ABDOMINAL PAIN: 0
HEMOPTYSIS: 0
PALPITATIONS: 0
COUGH: 0
VOMITING: 0
CHILLS: 0
FEVER: 0

## 2025-08-11 ASSESSMENT — FIBROSIS 4 INDEX: FIB4 SCORE: 0.71

## 2025-08-13 ENCOUNTER — TELEPHONE (OUTPATIENT)
Dept: NEUROLOGY | Facility: MEDICAL CENTER | Age: 35
End: 2025-08-13
Payer: COMMERCIAL

## 2025-08-14 ENCOUNTER — TELEPHONE (OUTPATIENT)
Dept: NEUROLOGY | Facility: MEDICAL CENTER | Age: 35
End: 2025-08-14
Payer: COMMERCIAL